# Patient Record
Sex: FEMALE | Race: WHITE | Employment: OTHER | URBAN - METROPOLITAN AREA
[De-identification: names, ages, dates, MRNs, and addresses within clinical notes are randomized per-mention and may not be internally consistent; named-entity substitution may affect disease eponyms.]

---

## 2017-01-04 ENCOUNTER — OFFICE VISIT - RIVER FALLS (OUTPATIENT)
Dept: FAMILY MEDICINE | Facility: CLINIC | Age: 62
End: 2017-01-04

## 2017-01-04 ASSESSMENT — MIFFLIN-ST. JEOR: SCORE: 1580.97

## 2017-01-16 ENCOUNTER — OFFICE VISIT - RIVER FALLS (OUTPATIENT)
Dept: FAMILY MEDICINE | Facility: CLINIC | Age: 62
End: 2017-01-16

## 2017-02-07 ENCOUNTER — OFFICE VISIT - RIVER FALLS (OUTPATIENT)
Dept: FAMILY MEDICINE | Facility: CLINIC | Age: 62
End: 2017-02-07

## 2017-02-23 ENCOUNTER — OFFICE VISIT - RIVER FALLS (OUTPATIENT)
Dept: FAMILY MEDICINE | Facility: CLINIC | Age: 62
End: 2017-02-23

## 2017-04-21 ENCOUNTER — OFFICE VISIT - RIVER FALLS (OUTPATIENT)
Dept: FAMILY MEDICINE | Facility: CLINIC | Age: 62
End: 2017-04-21

## 2017-04-21 ASSESSMENT — MIFFLIN-ST. JEOR: SCORE: 1524.72

## 2017-05-18 ENCOUNTER — OFFICE VISIT - RIVER FALLS (OUTPATIENT)
Dept: FAMILY MEDICINE | Facility: CLINIC | Age: 62
End: 2017-05-18

## 2017-09-26 LAB
CHOLEST SERPL-MCNC: 205 MG/DL
HDLC SERPL-MCNC: 35 MG/DL
LDLC SERPL CALC-MCNC: 97 MG/DL
NONHDLC SERPL-MCNC: NORMAL MG/DL
TRIGL SERPL-MCNC: 367 MG/DL

## 2018-05-24 ENCOUNTER — TRANSFERRED RECORDS (OUTPATIENT)
Dept: HEALTH INFORMATION MANAGEMENT | Facility: CLINIC | Age: 63
End: 2018-05-24

## 2018-05-24 ENCOUNTER — HOSPITAL ENCOUNTER (EMERGENCY)
Facility: CLINIC | Age: 63
Discharge: HOME | End: 2018-05-24
Attending: EMERGENCY MEDICINE | Admitting: EMERGENCY MEDICINE
Payer: MEDICARE

## 2018-05-24 VITALS
SYSTOLIC BLOOD PRESSURE: 150 MMHG | BODY MASS INDEX: 41.83 KG/M2 | TEMPERATURE: 97.8 F | DIASTOLIC BLOOD PRESSURE: 64 MMHG | OXYGEN SATURATION: 97 % | HEIGHT: 64 IN | WEIGHT: 245 LBS | RESPIRATION RATE: 18 BRPM

## 2018-05-24 DIAGNOSIS — R41.0 CONFUSION: ICD-10-CM

## 2018-05-24 LAB
ALBUMIN SERPL-MCNC: 4.1 G/DL (ref 3.4–5)
ALBUMIN UR-MCNC: 30 MG/DL
ALP SERPL-CCNC: 79 U/L (ref 40–150)
ALT SERPL W P-5'-P-CCNC: 58 U/L (ref 0–50)
AMPHETAMINES UR QL SCN: NEGATIVE
ANION GAP SERPL CALCULATED.3IONS-SCNC: 12 MMOL/L (ref 3–14)
APPEARANCE UR: CLEAR
AST SERPL W P-5'-P-CCNC: 63 U/L (ref 0–45)
BACTERIA #/AREA URNS HPF: ABNORMAL /HPF
BARBITURATES UR QL: NEGATIVE
BASOPHILS # BLD AUTO: 0 10E9/L (ref 0–0.2)
BASOPHILS NFR BLD AUTO: 0.6 %
BENZODIAZ UR QL: NEGATIVE
BILIRUB SERPL-MCNC: 0.6 MG/DL (ref 0.2–1.3)
BILIRUB UR QL STRIP: NEGATIVE
BUN SERPL-MCNC: 24 MG/DL (ref 7–30)
CALCIUM SERPL-MCNC: 8.9 MG/DL (ref 8.5–10.1)
CANNABINOIDS UR QL SCN: NEGATIVE
CHLORIDE SERPL-SCNC: 99 MMOL/L (ref 94–109)
CO2 SERPL-SCNC: 25 MMOL/L (ref 20–32)
COCAINE UR QL: NEGATIVE
COLOR UR AUTO: YELLOW
CREAT SERPL-MCNC: 0.9 MG/DL (ref 0.52–1.04)
DIFFERENTIAL METHOD BLD: NORMAL
EOSINOPHIL # BLD AUTO: 0.2 10E9/L (ref 0–0.7)
EOSINOPHIL NFR BLD AUTO: 3.2 %
ERYTHROCYTE [DISTWIDTH] IN BLOOD BY AUTOMATED COUNT: 14.4 % (ref 10–15)
GFR SERPL CREATININE-BSD FRML MDRD: 63 ML/MIN/1.7M2
GLUCOSE SERPL-MCNC: 210 MG/DL (ref 70–99)
GLUCOSE UR STRIP-MCNC: NEGATIVE MG/DL
HCT VFR BLD AUTO: 43.3 % (ref 35–47)
HGB BLD-MCNC: 14.3 G/DL (ref 11.7–15.7)
HGB UR QL STRIP: NEGATIVE
HYALINE CASTS #/AREA URNS LPF: 5 /LPF (ref 0–2)
IMM GRANULOCYTES # BLD: 0 10E9/L (ref 0–0.4)
IMM GRANULOCYTES NFR BLD: 0.2 %
INR PPP: 2.4 (ref 0.86–1.14)
INTERPRETATION ECG - MUSE: NORMAL
KETONES UR STRIP-MCNC: NEGATIVE MG/DL
LEUKOCYTE ESTERASE UR QL STRIP: NEGATIVE
LYMPHOCYTES # BLD AUTO: 1.5 10E9/L (ref 0.8–5.3)
LYMPHOCYTES NFR BLD AUTO: 30.6 %
MCH RBC QN AUTO: 30.2 PG (ref 26.5–33)
MCHC RBC AUTO-ENTMCNC: 33 G/DL (ref 31.5–36.5)
MCV RBC AUTO: 91 FL (ref 78–100)
MONOCYTES # BLD AUTO: 0.5 10E9/L (ref 0–1.3)
MONOCYTES NFR BLD AUTO: 9.9 %
MUCOUS THREADS #/AREA URNS LPF: PRESENT /LPF
NEUTROPHILS # BLD AUTO: 2.6 10E9/L (ref 1.6–8.3)
NEUTROPHILS NFR BLD AUTO: 55.5 %
NITRATE UR QL: NEGATIVE
OPIATES UR QL SCN: POSITIVE
PCP UR QL SCN: NEGATIVE
PH UR STRIP: 5.5 PH (ref 5–7)
PLATELET # BLD AUTO: 245 10E9/L (ref 150–450)
POTASSIUM SERPL-SCNC: 3.9 MMOL/L (ref 3.4–5.3)
PROT SERPL-MCNC: 8.3 G/DL (ref 6.8–8.8)
RBC # BLD AUTO: 4.74 10E12/L (ref 3.8–5.2)
RBC #/AREA URNS AUTO: 0 /HPF (ref 0–2)
SODIUM SERPL-SCNC: 136 MMOL/L (ref 133–144)
SOURCE: ABNORMAL
SP GR UR STRIP: 1.03 (ref 1–1.03)
SQUAMOUS #/AREA URNS AUTO: 8 /HPF (ref 0–1)
TSH SERPL DL<=0.005 MIU/L-ACNC: 1.65 MU/L (ref 0.4–4)
UROBILINOGEN UR STRIP-MCNC: NORMAL MG/DL (ref 0–2)
WBC # BLD AUTO: 4.7 10E9/L (ref 4–11)
WBC #/AREA URNS AUTO: 1 /HPF (ref 0–5)

## 2018-05-24 PROCEDURE — 80307 DRUG TEST PRSMV CHEM ANLYZR: CPT | Performed by: EMERGENCY MEDICINE

## 2018-05-24 PROCEDURE — 99285 EMERGENCY DEPT VISIT HI MDM: CPT | Mod: 25

## 2018-05-24 PROCEDURE — 85610 PROTHROMBIN TIME: CPT | Performed by: EMERGENCY MEDICINE

## 2018-05-24 PROCEDURE — 90791 PSYCH DIAGNOSTIC EVALUATION: CPT

## 2018-05-24 PROCEDURE — 80053 COMPREHEN METABOLIC PANEL: CPT | Performed by: EMERGENCY MEDICINE

## 2018-05-24 PROCEDURE — 81001 URINALYSIS AUTO W/SCOPE: CPT | Mod: XU | Performed by: EMERGENCY MEDICINE

## 2018-05-24 PROCEDURE — 85025 COMPLETE CBC W/AUTO DIFF WBC: CPT | Performed by: EMERGENCY MEDICINE

## 2018-05-24 PROCEDURE — 93005 ELECTROCARDIOGRAM TRACING: CPT

## 2018-05-24 PROCEDURE — 84443 ASSAY THYROID STIM HORMONE: CPT | Performed by: EMERGENCY MEDICINE

## 2018-05-24 ASSESSMENT — ENCOUNTER SYMPTOMS
DYSPHORIC MOOD: 1
BACK PAIN: 1
NAUSEA: 0
NERVOUS/ANXIOUS: 1
COUGH: 0
VOMITING: 0
DIAPHORESIS: 1
FEVER: 0
HALLUCINATIONS: 1

## 2018-05-24 NOTE — ED NOTES
"Pt with multiple complaints, pt unable to state reason for visit today, pt states \"the tremors and back pain and sleep problems are not new\"  "

## 2018-05-24 NOTE — ED PROVIDER NOTES
"  History     Chief Complaint:  Altered mental state       HPI:   The history is provided by the patient and a relative.      Samira Peralta is a 63 year old female on Coumadin with a history of atrial fibrillation, anxiety, depression, hypertension, sleep apnea, and chronic back pain who presents to the emergency department with her sister for evaluation of her altered mental state. The patient presents today with multiple complaints. She has a history of chronic back pain and was had an appointment with the pain clinic on Tuesday (2 days ago), but she accidentally missed this. She still has pain medications at home and her back pain has otherwise been baseline. She also reports that she has been diaphoretic the last couple days. She has needed to change her clothes multiple times and even after showering she still becomes sweaty. She feels this may be associated to the elevated temperature of her apartment and the use of a heating pad for her back. She also notes that she was more tremulous than usual yesterday so the \"office people\" at her apartment called the patients sister regarding this. The sister reports that she was with the patient for a couple hours yesterday and the patient was talking about all the things that she was hearing outside her apartment including people talking about drugs, a prostitution ring, and the planning of a murder. She also reports that 6 years ago the patient had an episode where she though people were video taping her and monsters were coming through her TV.     Today, as the sister feels that the patient was hallucinating and looking generally ill, she gave the patient an ultimatum: either EMS activation, or they drive together to the emergency department. The patient chose to come via private vehicle with her sister, even though this was not what she wanted. Here in the ED, the patient reports that she is feeling well, beside her back pain.  The patient feels that with " "the stress of her neighbors and this being the anniversary of her other sisters murder, she could be having exacerbations of her mental illness. She has not had any cough, fever, nausea, or vomiting and has no other complaints.     CARDIAC RISK FACTORS:  Sex:    Female   Tobacco:   Negative   Hypertension:   Negative   Hyperlipidemia:  Positive  Diabetes:   Negative   Family History:  Negative     Allergies:  Morphine Sulfate      Medications:    Ventolin   Xanax   Celebrex   Voltaren   Cymbalta  Vicodin   Dolophine  Modafinil   Omeprazole   Lyrica  Zanaflex  Coumadin     Past Medical History:    Anxiety and depression   Brachial plexitis   Hearing loss   Hypertension   Sleep apnea   Chronic back pain   Rosacea     Past Surgical History:    Breast surgery, reduction   Hysterectomy   Left knee tear, surgery     Family History:    History reviewed. No pertinent family history.      Social History:  Presents with sister   Tobacco use: Never smoker   Alcohol use: Rarely   PCP: [facility]    Marital Status:        Review of Systems   Constitutional: Positive for diaphoresis. Negative for fever.   Respiratory: Negative for cough.    Gastrointestinal: Negative for nausea and vomiting.   Musculoskeletal: Positive for back pain.   Psychiatric/Behavioral: Positive for behavioral problems, dysphoric mood and hallucinations. The patient is nervous/anxious.    All other systems reviewed and are negative.    Physical Exam     Patient Vitals for the past 24 hrs:   BP Temp Temp src Heart Rate Resp SpO2 Height Weight   05/24/18 1315 - - - - - 97 % - -   05/24/18 1309 150/64 - - - - - - -   05/24/18 1108 (!) 157/97 - - - - - - -   05/24/18 1026 (!) 200/123 97.8  F (36.6  C) Oral 91 16 97 % 1.626 m (5' 4\") 111.1 kg (245 lb)        Physical Exam  General: Appears obese  Head: No signs of trauma.   Mouth/Throat: Oropharynx is clear and moist.   Eyes: Conjunctivae are normal. Pupils are equal, round, " and reactive to light.   Neck: Normal range of motion. No nuchal rigidity. No cervical adenopathy  CV: Normal rate and regular rhythm.    Resp: Effort normal and breath sounds normal. No respiratory distress.   GI: Soft. There is no tenderness.  No rebound or guarding.  Normal bowel sounds.    MSK: Normal range of motion. no edema. No Calf tenderness.  Neuro: The patient is alert and oriented to person, place, and time.  PERRLA, EOMI, strength in upper/lower extremities normal and symmetrical.   Sensation normal. Speech normal.  GCS 15  Skin: Skin is warm and dry. No rash noted.       Emergency Department Course   ECG (11:27:00):  Rate 55 bpm. KY interval *. QRS duration 98. QT/QTc 386/369. P-R-T axes * -3 -12. Atrial fibrillation with slow ventricular response. Abnormal ECG. Agree with computer interpretation.  Interpreted at 1130 by Guevara Silva MD.     Laboratory:  UA with Microscopic: Protein albumin 30, Bacteria few, Squamous 8 (high), Mucous present, Hyaline casts 5 (high), ow Negative   Drug abuse screen 77 (urine) : Positive for opiates   TSH with free T4 reflex: 1.65  CBC: WNL (WBC 4.7, HGB 14.3, )   CMP: Glucose 201 (high), ALT 58 (high), AST 63 (high), ow WNL (Creatinine 0.90)   INR: 2.40 (high)     Emergency Department Course:  Patient was first evaluated by Angel Kincaid PA-C.     Past medical records, nursing notes, and vitals reviewed.  1107: I performed an exam of the patient and obtained history, as documented above.     Blood drawn. This was sent to the lab for further testing, results above.   The patient provided a urine sample here in the emergency department. This was sent for laboratory testing, findings above.     1220: DEC evaluated the patient.     1250: I rechecked the patient. Explained findings to patient and sister.     I personally reviewed the laboratory results with the Patient and sister and answered all related questions prior to discharge.       1310: I rechecked the  patient. Findings and plan explained to the Patient and sister. Patient discharged home with instructions regarding supportive care, medications, and reasons to return. The importance of close follow-up was reviewed.     Impression & Plan      Medical Decision Making:  Samira Peralta is a 63-year-old woman who presents due to concern of the patient's sister along with the clinic.  In speaking with the patient, she denies any specific complaints.  She states that other than it being hot in her apartment, she has been doing fairly well.  I did speak with the patient's sister who reported that she had concerns as the patient had has called the police 3 times over the last couple of days regarding what she is heard from the neighbors.  She reportedly been hearing some odd things such as thinking that neighbors are planning a murder, asking for carpet, tape, and a gun. She also talked about a prostitution ring and people using drugs.  Apparently, she had some odd thought patterns a number of years ago as well and was seen at the hospital and it was thought that this may be related to medications.  I did do workup in the department which came back overall unremarkable.  There are no signs of infection.  Patient's initial blood pressure was read as high, but I did note that it was done with an inappropriately sized blood pressure cuff and when I put an appropriate size blood pressure cuff on, her blood pressure was much more in the normal range.  I did have DEC see the patient and speak with the sister.  Ultimately, it seems that the patient has one fixed thought pattern regarding the neighbors, but does not seem to be a danger to herself or others and is otherwise caring for herself overall appropriately.  Speaking with the sister, it seems that the patient will do well most the time, but sometimes when she calls patient seems to be a little groggy and drowsy.  My concern is that the patient may be experiencing  side effects from her medications and she is on multiple medications that can cause some changes in mentation.  I recommended that she follow closely with her primary care doctor and pain clinic to discuss possible decrease in these medications.  Patient reports that she is supposed to have a stimulator placed for her back pain, and this may certainly help allow some of the medications to be decreased for controlling her overall symptoms.  When I spoke to the patient about this, the patient was somewhat defensive about having the medications decreased.  Overall for the patient is safe for discharge home with outpatient follow-up    Diagnosis:    ICD-10-CM    1. Confusion R41.0        Disposition:  Discharged to home with plan as outlined.     Mikhail Membreno  5/24/2018    EMERGENCY DEPARTMENT      Scribe Disclosure:   I, Mikhail Membreno, am serving as a scribe at 11:07 AM on 5/24/2018 to document services personally performed by Guevara Silva MD based on my observations and the provider's statements to me.        Guevara Silva MD  05/29/18 1781

## 2018-05-24 NOTE — DISCHARGE INSTRUCTIONS
You were seen because people were concerned about your actions and thought process.   Your medical work up here today is reassuring.  I am concerned that your periods of somewhat odd behavior may be related to medication side effects.  Please discuss your medications with your primary care doctor and pain doctor to discuss possible adjustments going forward.  Return to the ER for any new or worsening symptoms or further concerns.

## 2018-05-24 NOTE — ED AVS SNAPSHOT
Emergency Department    [address] [facility] [address]    Phone:  [phone]    Fax:  [phone]                                       Samira Peralta   MRN: [id number]    Department:   Emergency Department   Date of Visit:  5/24/2018           Patient Information     Date Of Birth          1955        Your diagnoses for this visit were:     Confusion        You were seen by Guevara Silva MD.      Follow-up Information     Schedule an appointment as soon as possible for a visit with [facility]    Contact information:    [address] [id number] [phone]          Follow up with  Emergency Department.    Specialty:  EMERGENCY MEDICINE    Why:  As needed, If symptoms worsen    Contact information:    [address] [facility] [address] [phone]        Call [facility].    Contact information:    [address] [address]  [phone]          Discharge Instructions       You were seen because people were concerned about your actions and thought process.   Your medical work up here today is reassuring.  I am concerned that your periods of somewhat odd behavior may be related to medication side effects.  Please discuss your medications with your primary care doctor and pain doctor to discuss possible adjustments going forward.  Return to the ER for any new or worsening symptoms or further concerns.    24 Hour Appointment Hotline       To make an appointment at any [facility], call [phone] ([phone]). If you don't have a family doctor or clinic, we will help you find one. [facility] are conveniently located to serve the needs of you and your family.             Review of your medicines      Our records show that you are taking the medicines listed below. If these are incorrect, please call your family doctor or clinic.        Dose /  Directions Last dose taken    ALPRAZolam 0.5 MG tablet   Commonly known as:  XANAX   Dose:  0.5 mg        Take 0.5 mg by mouth 3 times daily as needed.   Refills:  0        baclofen 20 MG tablet   Commonly known as:  LIORESAL   Dose:  20 mg        Take 20 mg by mouth 3 times daily.   Refills:  0        benzoyl peroxide 5 % Liqd   Quantity:  1 Bottle        Use daily as directed   Refills:  3        celeBREX 200 MG capsule   Dose:  200 mg   Generic drug:  celecoxib        Take 200 mg by mouth daily.   Refills:  0        clindamycin 1 % lotion   Commonly known as:  CLEOCIN T   Quantity:  60 mL        Apply  topically 2 times daily. To face   Refills:  12        CYMBALTA 30 MG EC capsule   Dose:  30 mg   Generic drug:  DULoxetine        Take 30 mg by mouth 2 times daily.   Refills:  0        lisinopril 20 MG tablet   Commonly known as:  PRINIVIL/ZESTRIL   Dose:  20 mg        Take 20 mg by mouth daily.   Refills:  0        LYRICA 100 MG capsule   Generic drug:  pregabalin        Take  by mouth 3 times daily.   Refills:  0        methadone 10 MG tablet   Commonly known as:  DOLOPHINE   Dose:  2 tablet        Take 2 tablets by mouth 2 times daily.   Refills:  0        omeprazole 20 MG tablet   Dose:  20 mg        Take 20 mg by mouth 2 times daily.   Refills:  0        PROVIGIL 200 MG tablet   Dose:  200 mg   Generic drug:  modafinil        Take 200 mg by mouth 2 times daily.   Refills:  0        tiZANidine 4 MG tablet   Commonly known as:  ZANAFLEX        Take  by mouth daily.   Refills:  0        VENTOLIN IN        Inhale  into the lungs.   Refills:  0        VICODIN 5-500 MG per tablet   Dose:  1-2 tablet   Generic drug:  HYDROcodone-acetaminophen        Take 1-2 tablets by mouth as needed.   Refills:  0        VOLTAREN 1 % Gel topical gel   Generic drug:  diclofenac        Apply  topically as needed.   Refills:  0        VOLTAREN PO        Take  by mouth.   Refills:  0                Procedures and tests performed  during your visit     CBC with platelets + differential    Comprehensive metabolic panel    Drug abuse screen urine    EKG 12 lead    INR    TSH with free T4 reflex    UA with Microscopic      Orders Needing Specimen Collection     None      Pending Results     No orders found from 5/22/2018 to 5/25/2018.            Pending Culture Results     No orders found from 5/22/2018 to 5/25/2018.            Pending Results Instructions     If you had any lab results that were not finalized at the time of your Discharge, you can call the ED Lab Result RN at [phone]. You will be contacted by this team for any positive Lab results or changes in treatment. The nurses are available 7 days a week from 10A to 6:30P.  You can leave a message 24 hours per day and they will return your call.        Test Results From Your Hospital Stay        5/24/2018 11:53 AM      Component Results     Component Value Ref Range & Units Status    WBC 4.7 4.0 - 11.0 10e9/L Final    RBC Count 4.74 3.8 - 5.2 10e12/L Final    Hemoglobin 14.3 11.7 - 15.7 g/dL Final    Hematocrit 43.3 35.0 - 47.0 % Final    MCV 91 78 - 100 fl Final    MCH 30.2 26.5 - 33.0 pg Final    MCHC 33.0 31.5 - 36.5 g/dL Final    RDW 14.4 10.0 - 15.0 % Final    Platelet Count 245 150 - 450 10e9/L Final    Diff Method Automated Method  Final    % Neutrophils 55.5 % Final    % Lymphocytes 30.6 % Final    % Monocytes 9.9 % Final    % Eosinophils 3.2 % Final    % Basophils 0.6 % Final    % Immature Granulocytes 0.2 % Final    Absolute Neutrophil 2.6 1.6 - 8.3 10e9/L Final    Absolute Lymphocytes 1.5 0.8 - 5.3 10e9/L Final    Absolute Monocytes 0.5 0.0 - 1.3 10e9/L Final    Absolute Eosinophils 0.2 0.0 - 0.7 10e9/L Final    Absolute Basophils 0.0 0.0 - 0.2 10e9/L Final    Abs Immature Granulocytes 0.0 0 - 0.4 10e9/L Final         5/24/2018 12:09 PM      Component Results     Component Value Ref Range & Units Status    Sodium 136 133 - 144 mmol/L Final    Potassium 3.9 3.4 - 5.3 mmol/L  Final    Chloride 99 94 - 109 mmol/L Final    Carbon Dioxide 25 20 - 32 mmol/L Final    Anion Gap 12 3 - 14 mmol/L Final    Glucose 210 (H) 70 - 99 mg/dL Final    Urea Nitrogen 24 7 - 30 mg/dL Final    Creatinine 0.90 0.52 - 1.04 mg/dL Final    GFR Estimate 63 >60 mL/min/1.7m2 Final    Non  GFR Calc    GFR Estimate If Black 77 >60 mL/min/1.7m2 Final    African American GFR Calc    Calcium 8.9 8.5 - 10.1 mg/dL Final    Bilirubin Total 0.6 0.2 - 1.3 mg/dL Final    Albumin 4.1 3.4 - 5.0 g/dL Final    Protein Total 8.3 6.8 - 8.8 g/dL Final    Alkaline Phosphatase 79 40 - 150 U/L Final    ALT 58 (H) 0 - 50 U/L Final    AST 63 (H) 0 - 45 U/L Final         5/24/2018 12:24 PM      Component Results     Component Value Ref Range & Units Status    Color Urine Yellow  Final    Appearance Urine Clear  Final    Glucose Urine Negative NEG^Negative mg/dL Final    Bilirubin Urine Negative NEG^Negative Final    Ketones Urine Negative NEG^Negative mg/dL Final    Specific Gravity Urine 1.027 1.003 - 1.035 Final    Blood Urine Negative NEG^Negative Final    pH Urine 5.5 5.0 - 7.0 pH Final    Protein Albumin Urine 30 (A) NEG^Negative mg/dL Final    Urobilinogen mg/dL Normal 0.0 - 2.0 mg/dL Final    Nitrite Urine Negative NEG^Negative Final    Leukocyte Esterase Urine Negative NEG^Negative Final    Source Midstream Urine  Final    WBC Urine 1 0 - 5 /HPF Final    RBC Urine 0 0 - 2 /HPF Final    Bacteria Urine Few (A) NEG^Negative /HPF Final    Squamous Epithelial /HPF Urine 8 (H) 0 - 1 /HPF Final    Mucous Urine Present (A) NEG^Negative /LPF Final    Hyaline Casts 5 (H) 0 - 2 /LPF Final         5/24/2018 12:33 PM      Component Results     Component Value Ref Range & Units Status    Amphetamine Qual Urine Negative NEG^Negative Final    Cutoff for a negative amphetamine is 500 ng/mL or less.    Barbiturates Qual Urine Negative NEG^Negative Final    Cutoff for a negative barbiturate is 200 ng/mL or less.    Benzodiazepine  Qual Urine Negative NEG^Negative Final    Cutoff for a negative benzodiazepine is 200 ng/mL or less.    Cannabinoids Qual Urine Negative NEG^Negative Final    Cutoff for a negative cannabinoid is 50 ng/mL or less.    Cocaine Qual Urine Negative NEG^Negative Final    Cutoff for a negative cocaine is 300 ng/mL or less.    Opiates Qualitative Urine Positive (A) NEG^Negative Final    Cutoff for a positive opiate is greater than 300 ng/mL. This is an unconfirmed   screening result to be used for medical purposes only.      PCP Qual Urine Negative NEG^Negative Final    Cutoff for a negative PCP is 25 ng/mL or less.         5/24/2018 12:16 PM      Component Results     Component Value Ref Range & Units Status    TSH 1.65 0.40 - 4.00 mU/L Final         5/24/2018 12:13 PM      Component Results     Component Value Ref Range & Units Status    INR 2.40 (H) 0.86 - 1.14 Final                Clinical Quality Measure: Blood Pressure Screening     Your blood pressure was checked while you were in the emergency department today. The last reading we obtained was  BP: (!) 157/97 . Please read the guidelines below about what these numbers mean and what you should do about them.  If your systolic blood pressure (the top number) is less than 120 and your diastolic blood pressure (the bottom number) is less than 80, then your blood pressure is normal. There is nothing more that you need to do about it.  If your systolic blood pressure (the top number) is 120-139 or your diastolic blood pressure (the bottom number) is 80-89, your blood pressure may be higher than it should be. You should have your blood pressure rechecked within a year by a primary care provider.  If your systolic blood pressure (the top number) is 140 or greater or your diastolic blood pressure (the bottom number) is 90 or greater, you may have high blood pressure. High blood pressure is treatable, but if left untreated over time it can put you at risk for heart attack,  "stroke, or kidney failure. You should have your blood pressure rechecked by a primary care provider within the next 4 weeks.  If your provider in the emergency department today gave you specific instructions to follow-up with your doctor or provider even sooner than that, you should follow that instruction and not wait for up to 4 weeks for your follow-up visit.        Thank you for choosing [facility]       Thank you for choosing [facility] for your care. Our goal is always to provide you with excellent care. Hearing back from our patients is one way we can continue to improve our services. Please take a few minutes to complete the written survey that you may receive in the mail after you visit with us. Thank you!        [facility]art Information     [employer] lets you send messages to your doctor, view your test results, renew your prescriptions, schedule appointments and more. To sign up, go to [email or web address] . Click on \"Log in\" on the left side of the screen, which will take you to the Welcome page. Then click on \"Sign up Now\" on the right side of the page.     You will be asked to enter the access code listed below, as well as some personal information. Please follow the directions to create your username and password.     Your access code is: [email or web address]  Expires: 2018  1:16 PM     Your access code will  in 90 days. If you need help or a new code, please call your [facility] clinic or [phone].        Care EveryWhere ID     This is your Care EveryWhere ID. This could be used by other organizations to access your [facility] medical records  [id number]        Equal Access to Services     [place] : Hadsujatha vazquez Sowilbert, waaxda luqadaha, qaybta kaalmada antonio, maulik marie. So [facility] [phone].    ATENCIÓN: Si habla español, tiene a mercedes disposición servicios gratuitos de asistencia lingüística. Llame al [phone].    We comply with applicable federal " civil rights laws and [place] laws. We do not discriminate on the basis of race, color, national origin, age, disability, sex, sexual orientation, or gender identity.            After Visit Summary       This is your record. Keep this with you and show to your community pharmacist(s) and doctor(s) at your next visit.

## 2018-05-24 NOTE — ED AVS SNAPSHOT
Emergency Department    [address] [facility] [address]    Phone:  [phone]    Fax:  [phone]                                       Samira Peralta   MRN: [id number]    Department:   Emergency Department   Date of Visit:  5/24/2018           After Visit Summary Signature Page     I have received my discharge instructions, and my questions have been answered. I have discussed any challenges I see with this plan with the nurse or doctor.    ..........................................................................................................................................  Patient/Patient Representative Signature      ..........................................................................................................................................  Patient Representative Print Name and Relationship to Patient    ..................................................               ................................................  Date                                            Time    ..........................................................................................................................................  Reviewed by Signature/Title    ...................................................              ..............................................  Date                                                            Time

## 2018-06-26 ENCOUNTER — HOSPITAL ENCOUNTER (EMERGENCY)
Facility: CLINIC | Age: 63
Discharge: HOME | End: 2018-06-26
Attending: EMERGENCY MEDICINE | Admitting: EMERGENCY MEDICINE
Payer: MEDICARE

## 2018-06-26 VITALS
OXYGEN SATURATION: 95 % | TEMPERATURE: 98.4 F | BODY MASS INDEX: 42.52 KG/M2 | RESPIRATION RATE: 18 BRPM | DIASTOLIC BLOOD PRESSURE: 109 MMHG | WEIGHT: 240 LBS | SYSTOLIC BLOOD PRESSURE: 147 MMHG | HEIGHT: 63 IN | HEART RATE: 62 BPM

## 2018-06-26 DIAGNOSIS — R45.1 AGITATION: ICD-10-CM

## 2018-06-26 LAB — TSH SERPL-ACNC: 1.33 MCU/ML

## 2018-06-26 PROCEDURE — 90791 PSYCH DIAGNOSTIC EVALUATION: CPT

## 2018-06-26 PROCEDURE — 99285 EMERGENCY DEPT VISIT HI MDM: CPT | Mod: 25

## 2018-06-26 ASSESSMENT — ENCOUNTER SYMPTOMS
COUGH: 0
VOMITING: 0
FEVER: 0

## 2018-06-26 NOTE — DISCHARGE INSTRUCTIONS
You were seen in the ER after an episode of having an argument with a family member.  Please call the assisted living facilities from the information that was provided to discuss your next living arrangements.  Please continue to follow up with your primary care doctor.

## 2018-06-26 NOTE — PROGRESS NOTES
I was asked to see this patient and her sister as they had housing questions.  They were asking if they qualified for an Elderly Waiver and what is better an EW or section 8 housing vouchers.  I gave them the information for the VE center in [place]. I directed them to go there and see what they qualify for. They continued to ask who takes an EW--they were given the Sr. Living book and I explained that they will need to contact some places listed in the book to find a place that accepts EW.  They continued to ask the same questions and I continued to direct them to the VE center. I updated the staff on the above.

## 2018-06-26 NOTE — ED AVS SNAPSHOT
Emergency Department    [address] [facility] [address]    Phone:  [phone]    Fax:  [phone]                                       Samira Peralta   MRN: [id number]    Department:   Emergency Department   Date of Visit:  6/26/2018           Patient Information     Date Of Birth          1955        Your diagnoses for this visit were:     Agitation        You were seen by Guevara Silva MD.      Follow-up Information     [facility]    Contact information:    [address]  [facility] [id number] [phone]          Follow up with  Emergency Department.    Specialty:  EMERGENCY MEDICINE    Why:  As needed, If symptoms worsen    Contact information:    [address] [facility] [address] [phone]        Discharge Instructions       You were seen in the ER after an episode of having an argument with a family member.  Please call the assisted living facilities from the information that was provided to discuss your next living arrangements.  Please continue to follow up with your primary care doctor.    24 Hour Appointment Hotline       To make an appointment at any [facility], call [phone] ([phone]). If you don't have a family doctor or clinic, we will help you find one. [place] clinics are conveniently located to serve the needs of you and your family.             Review of your medicines      Our records show that you are taking the medicines listed below. If these are incorrect, please call your family doctor or clinic.        Dose / Directions Last dose taken    ALPRAZolam 0.5 MG tablet   Commonly known as:  XANAX   Dose:  0.5 mg        Take 0.5 mg by mouth 3 times daily as needed.   Refills:  0        baclofen 20 MG tablet   Commonly known as:  LIORESAL   Dose:  20 mg        Take 20 mg by mouth 3 times daily.   Refills:  0        benzoyl peroxide 5 % Liqd   Quantity:  1  Bottle        Use daily as directed   Refills:  3        celeBREX 200 MG capsule   Dose:  200 mg   Generic drug:  celecoxib        Take 200 mg by mouth daily.   Refills:  0        clindamycin 1 % lotion   Commonly known as:  CLEOCIN T   Quantity:  60 mL        Apply  topically 2 times daily. To face   Refills:  12        CYMBALTA 30 MG EC capsule   Dose:  30 mg   Generic drug:  DULoxetine        Take 30 mg by mouth 2 times daily.   Refills:  0        lisinopril 20 MG tablet   Commonly known as:  PRINIVIL/ZESTRIL   Dose:  20 mg        Take 20 mg by mouth daily.   Refills:  0        LYRICA 100 MG capsule   Generic drug:  pregabalin        Take  by mouth 3 times daily.   Refills:  0        methadone 10 MG tablet   Commonly known as:  DOLOPHINE   Dose:  2 tablet        Take 2 tablets by mouth 2 times daily.   Refills:  0        omeprazole 20 MG tablet   Dose:  20 mg        Take 20 mg by mouth 2 times daily.   Refills:  0        PROVIGIL 200 MG tablet   Dose:  200 mg   Generic drug:  modafinil        Take 200 mg by mouth 2 times daily.   Refills:  0        tiZANidine 4 MG tablet   Commonly known as:  ZANAFLEX        Take  by mouth daily.   Refills:  0        VENTOLIN IN        Inhale  into the lungs.   Refills:  0        VICODIN 5-500 MG per tablet   Dose:  1-2 tablet   Generic drug:  HYDROcodone-acetaminophen        Take 1-2 tablets by mouth as needed.   Refills:  0        VOLTAREN 1 % Gel topical gel   Generic drug:  diclofenac        Apply  topically as needed.   Refills:  0        VOLTAREN PO        Take  by mouth.   Refills:  0                Orders Needing Specimen Collection     None      Pending Results     No orders found from 6/24/2018 to 6/27/2018.            Pending Culture Results     No orders found from 6/24/2018 to 6/27/2018.            Pending Results Instructions     If you had any lab results that were not finalized at the time of your Discharge, you can call the ED Lab Result RN at [phone]. You  will be contacted by this team for any positive Lab results or changes in treatment. The nurses are available 7 days a week from 10A to 6:30P.  You can leave a message 24 hours per day and they will return your call.        Test Results From Your Hospital Stay               Clinical Quality Measure: Blood Pressure Screening     Your blood pressure was checked while you were in the emergency department today. The last reading we obtained was  BP: 156/71 . Please read the guidelines below about what these numbers mean and what you should do about them.  If your systolic blood pressure (the top number) is less than 120 and your diastolic blood pressure (the bottom number) is less than 80, then your blood pressure is normal. There is nothing more that you need to do about it.  If your systolic blood pressure (the top number) is 120-139 or your diastolic blood pressure (the bottom number) is 80-89, your blood pressure may be higher than it should be. You should have your blood pressure rechecked within a year by a primary care provider.  If your systolic blood pressure (the top number) is 140 or greater or your diastolic blood pressure (the bottom number) is 90 or greater, you may have high blood pressure. High blood pressure is treatable, but if left untreated over time it can put you at risk for heart attack, stroke, or kidney failure. You should have your blood pressure rechecked by a primary care provider within the next 4 weeks.  If your provider in the emergency department today gave you specific instructions to follow-up with your doctor or provider even sooner than that, you should follow that instruction and not wait for up to 4 weeks for your follow-up visit.        Thank you for choosing [facility]       Thank you for choosing [facility] for your care. Our goal is always to provide you with excellent care. Hearing back from our patients is one way we can continue to improve our services. Please take a few minutes  "to complete the written survey that you may receive in the mail after you visit with us. Thank you!        [employer] Information     [employer] lets you send messages to your doctor, view your test results, renew your prescriptions, schedule appointments and more. To sign up, go to [email or web address] . Click on \"Log in\" on the left side of the screen, which will take you to the Welcome page. Then click on \"Sign up Now\" on the right side of the page.     You will be asked to enter the access code listed below, as well as some personal information. Please follow the directions to create your username and password.     Your access code is: [email or web address]  Expires: 2018  1:16 PM     Your access code will  in 90 days. If you need help or a new code, please call your [place] clinic or [phone].        Care EveryWhere ID     This is your Care EveryWhere ID. This could be used by other organizations to access your [place] medical records  [id number]        Equal Access to Services     [facility]: Hadii jaylon vazquez Sowilbert, waaxda luqadaha, qaybta kaalmasara alvarez, maulik romero . So [facility] [phone].    ATENCIÓN: Si habla español, tiene a mercedes disposición servicios gratuitos de asistencia lingüística. Llame al [phone].    We comply with applicable federal civil rights laws and [place] laws. We do not discriminate on the basis of race, color, national origin, age, disability, sex, sexual orientation, or gender identity.            After Visit Summary       This is your record. Keep this with you and show to your community pharmacist(s) and doctor(s) at your next visit.                  "
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
## 2018-07-25 ENCOUNTER — APPOINTMENT (OUTPATIENT)
Dept: CARDIOLOGY | Facility: CLINIC | Age: 63
DRG: 287 | End: 2018-07-25
Attending: PHYSICIAN ASSISTANT
Payer: MEDICARE

## 2018-07-25 ENCOUNTER — APPOINTMENT (OUTPATIENT)
Dept: CT IMAGING | Facility: CLINIC | Age: 63
DRG: 287 | End: 2018-07-25
Attending: EMERGENCY MEDICINE
Payer: MEDICARE

## 2018-07-25 ENCOUNTER — APPOINTMENT (OUTPATIENT)
Dept: CARDIOLOGY | Facility: CLINIC | Age: 63
DRG: 287 | End: 2018-07-25
Attending: INTERNAL MEDICINE
Payer: MEDICARE

## 2018-07-25 ENCOUNTER — HOSPITAL ENCOUNTER (INPATIENT)
Facility: CLINIC | Age: 63
LOS: 3 days | Discharge: HOME | DRG: 287 | End: 2018-07-28
Attending: EMERGENCY MEDICINE | Admitting: INTERNAL MEDICINE
Payer: MEDICARE

## 2018-07-25 DIAGNOSIS — E78.2 MIXED HYPERLIPIDEMIA: Primary | ICD-10-CM

## 2018-07-25 DIAGNOSIS — I48.91 ATRIAL FIBRILLATION, UNSPECIFIED TYPE (H): ICD-10-CM

## 2018-07-25 DIAGNOSIS — I21.4 NSTEMI (NON-ST ELEVATED MYOCARDIAL INFARCTION) (H): ICD-10-CM

## 2018-07-25 DIAGNOSIS — F29 PSYCHOSIS, UNSPECIFIED PSYCHOSIS TYPE (H): ICD-10-CM

## 2018-07-25 DIAGNOSIS — E11.9 TYPE 2 DIABETES MELLITUS WITHOUT COMPLICATION, WITHOUT LONG-TERM CURRENT USE OF INSULIN (H): ICD-10-CM

## 2018-07-25 PROBLEM — I24.9 ACS (ACUTE CORONARY SYNDROME) (H): Status: ACTIVE | Noted: 2018-07-25

## 2018-07-25 LAB
ALBUMIN SERPL-MCNC: 3.9 G/DL (ref 3.4–5)
ALBUMIN UR-MCNC: 30 MG/DL
ALP SERPL-CCNC: 89 U/L (ref 40–150)
ALT SERPL W P-5'-P-CCNC: 45 U/L (ref 0–50)
ANION GAP SERPL CALCULATED.3IONS-SCNC: 9 MMOL/L (ref 3–14)
APPEARANCE UR: ABNORMAL
AST SERPL W P-5'-P-CCNC: 64 U/L (ref 0–45)
BACTERIA #/AREA URNS HPF: ABNORMAL /HPF
BASOPHILS # BLD AUTO: 0 10E9/L (ref 0–0.2)
BASOPHILS NFR BLD AUTO: 0.4 %
BILIRUB SERPL-MCNC: 1.2 MG/DL (ref 0.2–1.3)
BILIRUB UR QL STRIP: NEGATIVE
BUN SERPL-MCNC: 10 MG/DL (ref 7–30)
CALCIUM SERPL-MCNC: 9.3 MG/DL (ref 8.5–10.1)
CHLORIDE SERPL-SCNC: 101 MMOL/L (ref 94–109)
CK SERPL-CCNC: 249 U/L (ref 30–225)
CO2 SERPL-SCNC: 25 MMOL/L (ref 20–32)
COLOR UR AUTO: YELLOW
CREAT SERPL-MCNC: 0.67 MG/DL (ref 0.52–1.04)
DIFFERENTIAL METHOD BLD: NORMAL
EOSINOPHIL # BLD AUTO: 0 10E9/L (ref 0–0.7)
EOSINOPHIL NFR BLD AUTO: 0.5 %
ERYTHROCYTE [DISTWIDTH] IN BLOOD BY AUTOMATED COUNT: 14.6 % (ref 10–15)
ERYTHROCYTE [DISTWIDTH] IN BLOOD BY AUTOMATED COUNT: 14.6 % (ref 10–15)
GFR SERPL CREATININE-BSD FRML MDRD: 88 ML/MIN/1.7M2
GLUCOSE BLDC GLUCOMTR-MCNC: 177 MG/DL (ref 70–99)
GLUCOSE SERPL-MCNC: 231 MG/DL (ref 70–99)
GLUCOSE UR STRIP-MCNC: 150 MG/DL
HBA1C MFR BLD: 7.9 % (ref 0–5.6)
HCT VFR BLD AUTO: 36.9 % (ref 35–47)
HCT VFR BLD AUTO: 39.1 % (ref 35–47)
HGB BLD-MCNC: 12.5 G/DL (ref 11.7–15.7)
HGB BLD-MCNC: 13.5 G/DL (ref 11.7–15.7)
HGB UR QL STRIP: NEGATIVE
IMM GRANULOCYTES # BLD: 0 10E9/L (ref 0–0.4)
IMM GRANULOCYTES NFR BLD: 0.4 %
INR PPP: 1.65 (ref 0.86–1.14)
INTERPRETATION ECG - MUSE: NORMAL
INTERPRETATION ECG - MUSE: NORMAL
KCT BLD-ACNC: 204 SEC (ref 75–150)
KETONES UR STRIP-MCNC: 10 MG/DL
LEUKOCYTE ESTERASE UR QL STRIP: NEGATIVE
LYMPHOCYTES # BLD AUTO: 1.1 10E9/L (ref 0.8–5.3)
LYMPHOCYTES NFR BLD AUTO: 14.1 %
MCH RBC QN AUTO: 30.9 PG (ref 26.5–33)
MCH RBC QN AUTO: 31.3 PG (ref 26.5–33)
MCHC RBC AUTO-ENTMCNC: 33.9 G/DL (ref 31.5–36.5)
MCHC RBC AUTO-ENTMCNC: 34.5 G/DL (ref 31.5–36.5)
MCV RBC AUTO: 91 FL (ref 78–100)
MCV RBC AUTO: 91 FL (ref 78–100)
MONOCYTES # BLD AUTO: 0.5 10E9/L (ref 0–1.3)
MONOCYTES NFR BLD AUTO: 6.5 %
MUCOUS THREADS #/AREA URNS LPF: PRESENT /LPF
NEUTROPHILS # BLD AUTO: 6.3 10E9/L (ref 1.6–8.3)
NEUTROPHILS NFR BLD AUTO: 78.1 %
NITRATE UR QL: NEGATIVE
NRBC # BLD AUTO: 0 10*3/UL
NRBC BLD AUTO-RTO: 0 /100
PH UR STRIP: 5.5 PH (ref 5–7)
PLATELET # BLD AUTO: 194 10E9/L (ref 150–450)
PLATELET # BLD AUTO: 221 10E9/L (ref 150–450)
POTASSIUM SERPL-SCNC: 3.8 MMOL/L (ref 3.4–5.3)
PROCALCITONIN SERPL-MCNC: 0.11 NG/ML
PROT SERPL-MCNC: 8.5 G/DL (ref 6.8–8.8)
RBC # BLD AUTO: 4.05 10E12/L (ref 3.8–5.2)
RBC # BLD AUTO: 4.31 10E12/L (ref 3.8–5.2)
RBC #/AREA URNS AUTO: 0 /HPF (ref 0–2)
SODIUM SERPL-SCNC: 135 MMOL/L (ref 133–144)
SOURCE: ABNORMAL
SP GR UR STRIP: 1.02 (ref 1–1.03)
SQUAMOUS #/AREA URNS AUTO: 87 /HPF (ref 0–1)
TROPONIN I SERPL-MCNC: 1.24 UG/L (ref 0–0.04)
TROPONIN I SERPL-MCNC: 1.41 UG/L (ref 0–0.04)
TROPONIN I SERPL-MCNC: 1.61 UG/L (ref 0–0.04)
UROBILINOGEN UR STRIP-MCNC: NORMAL MG/DL (ref 0–2)
WBC # BLD AUTO: 7.4 10E9/L (ref 4–11)
WBC # BLD AUTO: 8 10E9/L (ref 4–11)
WBC #/AREA URNS AUTO: 3 /HPF (ref 0–5)

## 2018-07-25 PROCEDURE — 25000125 ZZHC RX 250: Performed by: INTERNAL MEDICINE

## 2018-07-25 PROCEDURE — 25000128 H RX IP 250 OP 636: Performed by: EMERGENCY MEDICINE

## 2018-07-25 PROCEDURE — 4A023N7 MEASUREMENT OF CARDIAC SAMPLING AND PRESSURE, LEFT HEART, PERCUTANEOUS APPROACH: ICD-10-PCS | Performed by: INTERNAL MEDICINE

## 2018-07-25 PROCEDURE — 99153 MOD SED SAME PHYS/QHP EA: CPT

## 2018-07-25 PROCEDURE — 93325 DOPPLER ECHO COLOR FLOW MAPG: CPT | Mod: 26 | Performed by: INTERNAL MEDICINE

## 2018-07-25 PROCEDURE — 99152 MOD SED SAME PHYS/QHP 5/>YRS: CPT

## 2018-07-25 PROCEDURE — 84484 ASSAY OF TROPONIN QUANT: CPT | Performed by: EMERGENCY MEDICINE

## 2018-07-25 PROCEDURE — 27210856 ZZH ACCESS HEART CATH CR2

## 2018-07-25 PROCEDURE — 85347 COAGULATION TIME ACTIVATED: CPT

## 2018-07-25 PROCEDURE — 80053 COMPREHEN METABOLIC PANEL: CPT | Performed by: EMERGENCY MEDICINE

## 2018-07-25 PROCEDURE — 83036 HEMOGLOBIN GLYCOSYLATED A1C: CPT | Performed by: PHYSICIAN ASSISTANT

## 2018-07-25 PROCEDURE — A9270 NON-COVERED ITEM OR SERVICE: HCPCS | Mod: GY | Performed by: EMERGENCY MEDICINE

## 2018-07-25 PROCEDURE — 93458 L HRT ARTERY/VENTRICLE ANGIO: CPT

## 2018-07-25 PROCEDURE — 99222 1ST HOSP IP/OBS MODERATE 55: CPT | Mod: 25 | Performed by: INTERNAL MEDICINE

## 2018-07-25 PROCEDURE — 93308 TTE F-UP OR LMTD: CPT

## 2018-07-25 PROCEDURE — 93010 ELECTROCARDIOGRAM REPORT: CPT | Performed by: INTERNAL MEDICINE

## 2018-07-25 PROCEDURE — 25000128 H RX IP 250 OP 636: Performed by: PHYSICIAN ASSISTANT

## 2018-07-25 PROCEDURE — 36415 COLL VENOUS BLD VENIPUNCTURE: CPT | Performed by: PHYSICIAN ASSISTANT

## 2018-07-25 PROCEDURE — 25500064 ZZH RX 255 OP 636: Performed by: INTERNAL MEDICINE

## 2018-07-25 PROCEDURE — 99285 EMERGENCY DEPT VISIT HI MDM: CPT | Mod: 25

## 2018-07-25 PROCEDURE — 27210910 ZZH NEEDLE CR6

## 2018-07-25 PROCEDURE — 27211089 ZZH KIT ACIST INJECTOR CR3

## 2018-07-25 PROCEDURE — 93458 L HRT ARTERY/VENTRICLE ANGIO: CPT | Mod: 26 | Performed by: INTERNAL MEDICINE

## 2018-07-25 PROCEDURE — 96374 THER/PROPH/DIAG INJ IV PUSH: CPT | Mod: 59

## 2018-07-25 PROCEDURE — 93321 DOPPLER ECHO F-UP/LMTD STD: CPT | Mod: 26 | Performed by: INTERNAL MEDICINE

## 2018-07-25 PROCEDURE — 27210946 ZZH KIT HC TOTES DISP CR8

## 2018-07-25 PROCEDURE — 93005 ELECTROCARDIOGRAM TRACING: CPT

## 2018-07-25 PROCEDURE — 84145 PROCALCITONIN (PCT): CPT | Performed by: EMERGENCY MEDICINE

## 2018-07-25 PROCEDURE — 25000132 ZZH RX MED GY IP 250 OP 250 PS 637: Mod: GY | Performed by: EMERGENCY MEDICINE

## 2018-07-25 PROCEDURE — 82550 ASSAY OF CK (CPK): CPT | Performed by: EMERGENCY MEDICINE

## 2018-07-25 PROCEDURE — 81001 URINALYSIS AUTO W/SCOPE: CPT | Performed by: EMERGENCY MEDICINE

## 2018-07-25 PROCEDURE — A9270 NON-COVERED ITEM OR SERVICE: HCPCS | Mod: GY | Performed by: INTERNAL MEDICINE

## 2018-07-25 PROCEDURE — 21000000 ZZH R&B IMCU HEART CARE

## 2018-07-25 PROCEDURE — 93005 ELECTROCARDIOGRAM TRACING: CPT | Mod: 76

## 2018-07-25 PROCEDURE — 74177 CT ABD & PELVIS W/CONTRAST: CPT

## 2018-07-25 PROCEDURE — 25000132 ZZH RX MED GY IP 250 OP 250 PS 637: Mod: GY

## 2018-07-25 PROCEDURE — 25000125 ZZHC RX 250: Performed by: EMERGENCY MEDICINE

## 2018-07-25 PROCEDURE — 27210914 ZZH SHEATH CR8

## 2018-07-25 PROCEDURE — 93308 TTE F-UP OR LMTD: CPT | Mod: 26 | Performed by: INTERNAL MEDICINE

## 2018-07-25 PROCEDURE — 25000132 ZZH RX MED GY IP 250 OP 250 PS 637: Mod: GY | Performed by: INTERNAL MEDICINE

## 2018-07-25 PROCEDURE — 00000146 ZZHCL STATISTIC GLUCOSE BY METER IP

## 2018-07-25 PROCEDURE — 85610 PROTHROMBIN TIME: CPT | Performed by: EMERGENCY MEDICINE

## 2018-07-25 PROCEDURE — 25000132 ZZH RX MED GY IP 250 OP 250 PS 637: Mod: GY | Performed by: PHYSICIAN ASSISTANT

## 2018-07-25 PROCEDURE — 84484 ASSAY OF TROPONIN QUANT: CPT | Performed by: PHYSICIAN ASSISTANT

## 2018-07-25 PROCEDURE — 25000128 H RX IP 250 OP 636: Performed by: INTERNAL MEDICINE

## 2018-07-25 PROCEDURE — A9270 NON-COVERED ITEM OR SERVICE: HCPCS | Mod: GY

## 2018-07-25 PROCEDURE — A9270 NON-COVERED ITEM OR SERVICE: HCPCS | Mod: GY | Performed by: PHYSICIAN ASSISTANT

## 2018-07-25 PROCEDURE — B2111ZZ FLUOROSCOPY OF MULTIPLE CORONARY ARTERIES USING LOW OSMOLAR CONTRAST: ICD-10-PCS | Performed by: INTERNAL MEDICINE

## 2018-07-25 PROCEDURE — 25000132 ZZH RX MED GY IP 250 OP 250 PS 637: Performed by: INTERNAL MEDICINE

## 2018-07-25 PROCEDURE — 27210795 ZZH PAD DEFIB QUICK CR4

## 2018-07-25 PROCEDURE — 85025 COMPLETE CBC W/AUTO DIFF WBC: CPT | Performed by: EMERGENCY MEDICINE

## 2018-07-25 PROCEDURE — B2151ZZ FLUOROSCOPY OF LEFT HEART USING LOW OSMOLAR CONTRAST: ICD-10-PCS | Performed by: INTERNAL MEDICINE

## 2018-07-25 PROCEDURE — 99207 ZZC APP CREDIT; MD BILLING SHARED VISIT: CPT | Performed by: PHYSICIAN ASSISTANT

## 2018-07-25 PROCEDURE — 96375 TX/PRO/DX INJ NEW DRUG ADDON: CPT

## 2018-07-25 PROCEDURE — 99223 1ST HOSP IP/OBS HIGH 75: CPT | Mod: AI | Performed by: INTERNAL MEDICINE

## 2018-07-25 PROCEDURE — C1769 GUIDE WIRE: HCPCS

## 2018-07-25 PROCEDURE — 85027 COMPLETE CBC AUTOMATED: CPT | Performed by: PHYSICIAN ASSISTANT

## 2018-07-25 PROCEDURE — 99152 MOD SED SAME PHYS/QHP 5/>YRS: CPT | Performed by: INTERNAL MEDICINE

## 2018-07-25 PROCEDURE — C1760 CLOSURE DEV, VASC: HCPCS

## 2018-07-25 RX ORDER — DOBUTAMINE HYDROCHLORIDE 200 MG/100ML
2-20 INJECTION INTRAVENOUS CONTINUOUS PRN
Status: DISCONTINUED | OUTPATIENT
Start: 2018-07-25 | End: 2018-07-25 | Stop reason: HOSPADM

## 2018-07-25 RX ORDER — NITROGLYCERIN 20 MG/100ML
.07-1.8 INJECTION INTRAVENOUS CONTINUOUS
Status: DISCONTINUED | OUTPATIENT
Start: 2018-07-25 | End: 2018-07-25

## 2018-07-25 RX ORDER — ACETAMINOPHEN 500 MG
500 TABLET ORAL 2 TIMES DAILY PRN
Status: DISCONTINUED | OUTPATIENT
Start: 2018-07-25 | End: 2018-07-25

## 2018-07-25 RX ORDER — FENTANYL CITRATE 50 UG/ML
25-50 INJECTION, SOLUTION INTRAMUSCULAR; INTRAVENOUS
Status: ACTIVE | OUTPATIENT
Start: 2018-07-25 | End: 2018-07-26

## 2018-07-25 RX ORDER — WARFARIN SODIUM 7.5 MG/1
7.5 TABLET ORAL
Status: COMPLETED | OUTPATIENT
Start: 2018-07-25 | End: 2018-07-25

## 2018-07-25 RX ORDER — CETIRIZINE HYDROCHLORIDE 10 MG/1
10 TABLET ORAL DAILY
Status: DISCONTINUED | OUTPATIENT
Start: 2018-07-25 | End: 2018-07-28 | Stop reason: HOSPADM

## 2018-07-25 RX ORDER — LORAZEPAM 2 MG/ML
0.5 INJECTION INTRAMUSCULAR
Status: DISCONTINUED | OUTPATIENT
Start: 2018-07-25 | End: 2018-07-25 | Stop reason: HOSPADM

## 2018-07-25 RX ORDER — ASPIRIN 81 MG/1
81-324 TABLET, CHEWABLE ORAL
Status: DISCONTINUED | OUTPATIENT
Start: 2018-07-25 | End: 2018-07-25 | Stop reason: HOSPADM

## 2018-07-25 RX ORDER — TIZANIDINE 2 MG/1
2 TABLET ORAL 2 TIMES DAILY PRN
Status: DISCONTINUED | OUTPATIENT
Start: 2018-07-25 | End: 2018-07-28 | Stop reason: HOSPADM

## 2018-07-25 RX ORDER — SODIUM CHLORIDE 9 MG/ML
INJECTION, SOLUTION INTRAVENOUS CONTINUOUS
Status: DISCONTINUED | OUTPATIENT
Start: 2018-07-25 | End: 2018-07-27

## 2018-07-25 RX ORDER — POTASSIUM CHLORIDE 7.45 MG/ML
10 INJECTION INTRAVENOUS
Status: DISCONTINUED | OUTPATIENT
Start: 2018-07-25 | End: 2018-07-25 | Stop reason: HOSPADM

## 2018-07-25 RX ORDER — DOPAMINE HYDROCHLORIDE 160 MG/100ML
2-20 INJECTION, SOLUTION INTRAVENOUS CONTINUOUS PRN
Status: DISCONTINUED | OUTPATIENT
Start: 2018-07-25 | End: 2018-07-25 | Stop reason: HOSPADM

## 2018-07-25 RX ORDER — NITROGLYCERIN 5 MG/ML
100-500 VIAL (ML) INTRAVENOUS
Status: DISCONTINUED | OUTPATIENT
Start: 2018-07-25 | End: 2018-07-25 | Stop reason: HOSPADM

## 2018-07-25 RX ORDER — LIDOCAINE HYDROCHLORIDE 10 MG/ML
30 INJECTION, SOLUTION EPIDURAL; INFILTRATION; INTRACAUDAL; PERINEURAL
Status: DISCONTINUED | OUTPATIENT
Start: 2018-07-25 | End: 2018-07-25 | Stop reason: HOSPADM

## 2018-07-25 RX ORDER — POLYETHYLENE GLYCOL 3350 17 G/17G
17 POWDER, FOR SOLUTION ORAL DAILY PRN
Status: DISCONTINUED | OUTPATIENT
Start: 2018-07-25 | End: 2018-07-28 | Stop reason: HOSPADM

## 2018-07-25 RX ORDER — BACLOFEN 20 MG/1
20 TABLET ORAL 3 TIMES DAILY PRN
Status: DISCONTINUED | OUTPATIENT
Start: 2018-07-25 | End: 2018-07-28 | Stop reason: HOSPADM

## 2018-07-25 RX ORDER — IOPAMIDOL 755 MG/ML
123 INJECTION, SOLUTION INTRAVASCULAR ONCE
Status: COMPLETED | OUTPATIENT
Start: 2018-07-25 | End: 2018-07-25

## 2018-07-25 RX ORDER — HYDROCODONE BITARTRATE AND ACETAMINOPHEN 5; 325 MG/1; MG/1
1-2 TABLET ORAL EVERY 4 HOURS PRN
Status: CANCELLED | OUTPATIENT
Start: 2018-07-25

## 2018-07-25 RX ORDER — FENTANYL CITRATE 50 UG/ML
25-50 INJECTION, SOLUTION INTRAMUSCULAR; INTRAVENOUS
Status: DISCONTINUED | OUTPATIENT
Start: 2018-07-25 | End: 2018-07-25 | Stop reason: HOSPADM

## 2018-07-25 RX ORDER — LORAZEPAM 2 MG/ML
.5-2 INJECTION INTRAMUSCULAR EVERY 4 HOURS PRN
Status: DISCONTINUED | OUTPATIENT
Start: 2018-07-25 | End: 2018-07-25 | Stop reason: HOSPADM

## 2018-07-25 RX ORDER — PROTAMINE SULFATE 10 MG/ML
25-100 INJECTION, SOLUTION INTRAVENOUS EVERY 5 MIN PRN
Status: DISCONTINUED | OUTPATIENT
Start: 2018-07-25 | End: 2018-07-25 | Stop reason: HOSPADM

## 2018-07-25 RX ORDER — CLOPIDOGREL BISULFATE 75 MG/1
75 TABLET ORAL
Status: DISCONTINUED | OUTPATIENT
Start: 2018-07-25 | End: 2018-07-25 | Stop reason: HOSPADM

## 2018-07-25 RX ORDER — ASPIRIN 325 MG
325 TABLET ORAL DAILY
Status: DISCONTINUED | OUTPATIENT
Start: 2018-07-26 | End: 2018-07-25

## 2018-07-25 RX ORDER — NALOXONE HYDROCHLORIDE 0.4 MG/ML
.1-.4 INJECTION, SOLUTION INTRAMUSCULAR; INTRAVENOUS; SUBCUTANEOUS
Status: DISCONTINUED | OUTPATIENT
Start: 2018-07-25 | End: 2018-07-28 | Stop reason: HOSPADM

## 2018-07-25 RX ORDER — PHENYLEPHRINE HCL IN 0.9% NACL 1 MG/10 ML
20-100 SYRINGE (ML) INTRAVENOUS
Status: DISCONTINUED | OUTPATIENT
Start: 2018-07-25 | End: 2018-07-25 | Stop reason: HOSPADM

## 2018-07-25 RX ORDER — SODIUM CHLORIDE 9 MG/ML
INJECTION, SOLUTION INTRAVENOUS CONTINUOUS
Status: DISCONTINUED | OUTPATIENT
Start: 2018-07-25 | End: 2018-07-25

## 2018-07-25 RX ORDER — HYDROCODONE BITARTRATE AND ACETAMINOPHEN 7.5; 325 MG/1; MG/1
1 TABLET ORAL ONCE
Status: COMPLETED | OUTPATIENT
Start: 2018-07-25 | End: 2018-07-25

## 2018-07-25 RX ORDER — DIPHENHYDRAMINE HYDROCHLORIDE 50 MG/ML
25-50 INJECTION INTRAMUSCULAR; INTRAVENOUS
Status: DISCONTINUED | OUTPATIENT
Start: 2018-07-25 | End: 2018-07-25 | Stop reason: HOSPADM

## 2018-07-25 RX ORDER — PRASUGREL 10 MG/1
10-60 TABLET, FILM COATED ORAL
Status: DISCONTINUED | OUTPATIENT
Start: 2018-07-25 | End: 2018-07-25 | Stop reason: HOSPADM

## 2018-07-25 RX ORDER — NITROGLYCERIN 0.4 MG/1
0.4 TABLET SUBLINGUAL EVERY 5 MIN PRN
Status: DISCONTINUED | OUTPATIENT
Start: 2018-07-25 | End: 2018-07-25

## 2018-07-25 RX ORDER — DEXTROSE MONOHYDRATE 25 G/50ML
12.5-5 INJECTION, SOLUTION INTRAVENOUS EVERY 30 MIN PRN
Status: DISCONTINUED | OUTPATIENT
Start: 2018-07-25 | End: 2018-07-25 | Stop reason: HOSPADM

## 2018-07-25 RX ORDER — NICOTINE POLACRILEX 4 MG
15-30 LOZENGE BUCCAL
Status: DISCONTINUED | OUTPATIENT
Start: 2018-07-25 | End: 2018-07-28 | Stop reason: HOSPADM

## 2018-07-25 RX ORDER — NITROGLYCERIN 20 MG/100ML
.07-1.8 INJECTION INTRAVENOUS CONTINUOUS PRN
Status: DISCONTINUED | OUTPATIENT
Start: 2018-07-25 | End: 2018-07-25 | Stop reason: HOSPADM

## 2018-07-25 RX ORDER — HEPARIN SODIUM 1000 [USP'U]/ML
1000-10000 INJECTION, SOLUTION INTRAVENOUS; SUBCUTANEOUS EVERY 5 MIN PRN
Status: DISCONTINUED | OUTPATIENT
Start: 2018-07-25 | End: 2018-07-25 | Stop reason: HOSPADM

## 2018-07-25 RX ORDER — POLYETHYLENE GLYCOL 3350 17 G/17G
1 POWDER, FOR SOLUTION ORAL DAILY PRN
Status: ON HOLD | COMMUNITY
End: 2019-01-01

## 2018-07-25 RX ORDER — ALPRAZOLAM 0.5 MG
0.5 TABLET ORAL DAILY PRN
Status: DISCONTINUED | OUTPATIENT
Start: 2018-07-25 | End: 2018-07-28 | Stop reason: HOSPADM

## 2018-07-25 RX ORDER — NITROGLYCERIN 5 MG/ML
100-200 VIAL (ML) INTRAVENOUS
Status: DISCONTINUED | OUTPATIENT
Start: 2018-07-25 | End: 2018-07-25 | Stop reason: HOSPADM

## 2018-07-25 RX ORDER — EPINEPHRINE 1 MG/ML
0.3 INJECTION, SOLUTION, CONCENTRATE INTRAVENOUS
Status: DISCONTINUED | OUTPATIENT
Start: 2018-07-25 | End: 2018-07-25 | Stop reason: HOSPADM

## 2018-07-25 RX ORDER — CETIRIZINE HYDROCHLORIDE 10 MG/1
10 TABLET ORAL DAILY
COMMUNITY

## 2018-07-25 RX ORDER — LIDOCAINE 40 MG/G
CREAM TOPICAL
Status: DISCONTINUED | OUTPATIENT
Start: 2018-07-25 | End: 2018-07-25 | Stop reason: HOSPADM

## 2018-07-25 RX ORDER — FUROSEMIDE 20 MG
20 TABLET ORAL DAILY
Status: DISCONTINUED | OUTPATIENT
Start: 2018-07-26 | End: 2018-07-28 | Stop reason: HOSPADM

## 2018-07-25 RX ORDER — NITROGLYCERIN 0.4 MG/1
0.4 TABLET SUBLINGUAL EVERY 5 MIN PRN
Status: DISCONTINUED | OUTPATIENT
Start: 2018-07-25 | End: 2018-07-25 | Stop reason: HOSPADM

## 2018-07-25 RX ORDER — ENALAPRILAT 1.25 MG/ML
1.25-2.5 INJECTION INTRAVENOUS
Status: DISCONTINUED | OUTPATIENT
Start: 2018-07-25 | End: 2018-07-25 | Stop reason: HOSPADM

## 2018-07-25 RX ORDER — FLUMAZENIL 0.1 MG/ML
0.2 INJECTION, SOLUTION INTRAVENOUS
Status: DISCONTINUED | OUTPATIENT
Start: 2018-07-25 | End: 2018-07-25 | Stop reason: HOSPADM

## 2018-07-25 RX ORDER — ALBUTEROL SULFATE 90 UG/1
2 AEROSOL, METERED RESPIRATORY (INHALATION) 4 TIMES DAILY PRN
Status: DISCONTINUED | OUTPATIENT
Start: 2018-07-25 | End: 2018-07-28 | Stop reason: HOSPADM

## 2018-07-25 RX ORDER — ALUMINA, MAGNESIA, AND SIMETHICONE 2400; 2400; 240 MG/30ML; MG/30ML; MG/30ML
30 SUSPENSION ORAL EVERY 4 HOURS PRN
Status: DISCONTINUED | OUTPATIENT
Start: 2018-07-25 | End: 2018-07-28 | Stop reason: HOSPADM

## 2018-07-25 RX ORDER — ASPIRIN 325 MG
325 TABLET ORAL ONCE
Status: COMPLETED | OUTPATIENT
Start: 2018-07-25 | End: 2018-07-25

## 2018-07-25 RX ORDER — BUPIVACAINE HYDROCHLORIDE 2.5 MG/ML
1-10 INJECTION, SOLUTION EPIDURAL; INFILTRATION; INTRACAUDAL
Status: DISCONTINUED | OUTPATIENT
Start: 2018-07-25 | End: 2018-07-25 | Stop reason: HOSPADM

## 2018-07-25 RX ORDER — NALOXONE HYDROCHLORIDE 0.4 MG/ML
.2-.4 INJECTION, SOLUTION INTRAMUSCULAR; INTRAVENOUS; SUBCUTANEOUS
Status: ACTIVE | OUTPATIENT
Start: 2018-07-25 | End: 2018-07-26

## 2018-07-25 RX ORDER — ASPIRIN 325 MG
325 TABLET ORAL
Status: DISCONTINUED | OUTPATIENT
Start: 2018-07-25 | End: 2018-07-25 | Stop reason: HOSPADM

## 2018-07-25 RX ORDER — MULTIVITAMIN,THERAPEUTIC
1 TABLET ORAL DAILY
COMMUNITY

## 2018-07-25 RX ORDER — SODIUM NITROPRUSSIDE 25 MG/ML
100-200 INJECTION INTRAVENOUS
Status: DISCONTINUED | OUTPATIENT
Start: 2018-07-25 | End: 2018-07-25 | Stop reason: HOSPADM

## 2018-07-25 RX ORDER — METOPROLOL TARTRATE 50 MG
50 TABLET ORAL 2 TIMES DAILY
Status: DISCONTINUED | OUTPATIENT
Start: 2018-07-25 | End: 2018-07-26

## 2018-07-25 RX ORDER — NICARDIPINE HYDROCHLORIDE 2.5 MG/ML
100 INJECTION INTRAVENOUS
Status: DISCONTINUED | OUTPATIENT
Start: 2018-07-25 | End: 2018-07-25 | Stop reason: HOSPADM

## 2018-07-25 RX ORDER — BACLOFEN 10 MG/1
20 TABLET ORAL 3 TIMES DAILY PRN
Status: DISCONTINUED | OUTPATIENT
Start: 2018-07-25 | End: 2018-07-25

## 2018-07-25 RX ORDER — ADENOSINE 3 MG/ML
12-12000 INJECTION, SOLUTION INTRAVENOUS
Status: DISCONTINUED | OUTPATIENT
Start: 2018-07-25 | End: 2018-07-25 | Stop reason: HOSPADM

## 2018-07-25 RX ORDER — SIMETHICONE 80 MG
80 TABLET,CHEWABLE ORAL 2 TIMES DAILY
Status: DISCONTINUED | OUTPATIENT
Start: 2018-07-25 | End: 2018-07-28 | Stop reason: HOSPADM

## 2018-07-25 RX ORDER — ATROPINE SULFATE 0.1 MG/ML
.5-1 INJECTION INTRAVENOUS
Status: DISCONTINUED | OUTPATIENT
Start: 2018-07-25 | End: 2018-07-25 | Stop reason: HOSPADM

## 2018-07-25 RX ORDER — VERAPAMIL HYDROCHLORIDE 2.5 MG/ML
1-2.5 INJECTION, SOLUTION INTRAVENOUS
Status: DISCONTINUED | OUTPATIENT
Start: 2018-07-25 | End: 2018-07-25 | Stop reason: HOSPADM

## 2018-07-25 RX ORDER — LIDOCAINE 40 MG/G
CREAM TOPICAL
Status: DISCONTINUED | OUTPATIENT
Start: 2018-07-25 | End: 2018-07-25

## 2018-07-25 RX ORDER — ACETAMINOPHEN 325 MG/1
325-650 TABLET ORAL EVERY 4 HOURS PRN
Status: DISCONTINUED | OUTPATIENT
Start: 2018-07-25 | End: 2018-07-28 | Stop reason: HOSPADM

## 2018-07-25 RX ORDER — POLYETHYLENE GLYCOL 3350 17 G/17G
17 POWDER, FOR SOLUTION ORAL DAILY PRN
Status: DISCONTINUED | OUTPATIENT
Start: 2018-07-25 | End: 2018-07-25

## 2018-07-25 RX ORDER — POTASSIUM CHLORIDE 1500 MG/1
20 TABLET, EXTENDED RELEASE ORAL
Status: COMPLETED | OUTPATIENT
Start: 2018-07-25 | End: 2018-07-25

## 2018-07-25 RX ORDER — MORPHINE SULFATE 2 MG/ML
1-2 INJECTION, SOLUTION INTRAMUSCULAR; INTRAVENOUS EVERY 5 MIN PRN
Status: DISCONTINUED | OUTPATIENT
Start: 2018-07-25 | End: 2018-07-25 | Stop reason: HOSPADM

## 2018-07-25 RX ORDER — HYDRALAZINE HYDROCHLORIDE 20 MG/ML
10-20 INJECTION INTRAMUSCULAR; INTRAVENOUS
Status: DISCONTINUED | OUTPATIENT
Start: 2018-07-25 | End: 2018-07-25 | Stop reason: HOSPADM

## 2018-07-25 RX ORDER — LIDOCAINE HYDROCHLORIDE 10 MG/ML
1-10 INJECTION, SOLUTION EPIDURAL; INFILTRATION; INTRACAUDAL; PERINEURAL
Status: COMPLETED | OUTPATIENT
Start: 2018-07-25 | End: 2018-07-25

## 2018-07-25 RX ORDER — HYDROCODONE BITARTRATE AND ACETAMINOPHEN 5; 325 MG/1; MG/1
1-2 TABLET ORAL EVERY 6 HOURS PRN
Status: DISCONTINUED | OUTPATIENT
Start: 2018-07-25 | End: 2018-07-25

## 2018-07-25 RX ORDER — DIAPER,BRIEF,INFANT-TODD,DISP
EACH MISCELLANEOUS 2 TIMES DAILY
COMMUNITY
End: 2018-07-25

## 2018-07-25 RX ORDER — FLUMAZENIL 0.1 MG/ML
0.2 INJECTION, SOLUTION INTRAVENOUS
Status: ACTIVE | OUTPATIENT
Start: 2018-07-25 | End: 2018-07-26

## 2018-07-25 RX ORDER — POTASSIUM CHLORIDE 750 MG/1
20 TABLET, EXTENDED RELEASE ORAL DAILY PRN
Status: ON HOLD | COMMUNITY
End: 2019-01-01

## 2018-07-25 RX ORDER — ASPIRIN 81 MG/1
324 TABLET, CHEWABLE ORAL ONCE
Status: DISCONTINUED | OUTPATIENT
Start: 2018-07-25 | End: 2018-07-25

## 2018-07-25 RX ORDER — ATORVASTATIN CALCIUM 40 MG/1
40 TABLET, FILM COATED ORAL EVERY EVENING
Status: DISCONTINUED | OUTPATIENT
Start: 2018-07-25 | End: 2018-07-28 | Stop reason: HOSPADM

## 2018-07-25 RX ORDER — HYDROCODONE BITARTRATE AND ACETAMINOPHEN 7.5; 325 MG/1; MG/1
1 TABLET ORAL 3 TIMES DAILY PRN
COMMUNITY

## 2018-07-25 RX ORDER — PREGABALIN 75 MG/1
150 CAPSULE ORAL 3 TIMES DAILY
Status: DISCONTINUED | OUTPATIENT
Start: 2018-07-25 | End: 2018-07-28 | Stop reason: HOSPADM

## 2018-07-25 RX ORDER — NIFEDIPINE 10 MG/1
10 CAPSULE ORAL
Status: DISCONTINUED | OUTPATIENT
Start: 2018-07-25 | End: 2018-07-25 | Stop reason: HOSPADM

## 2018-07-25 RX ORDER — DULOXETIN HYDROCHLORIDE 60 MG/1
60 CAPSULE, DELAYED RELEASE ORAL 2 TIMES DAILY
Status: DISCONTINUED | OUTPATIENT
Start: 2018-07-25 | End: 2018-07-28 | Stop reason: HOSPADM

## 2018-07-25 RX ORDER — FUROSEMIDE 10 MG/ML
20-100 INJECTION INTRAMUSCULAR; INTRAVENOUS
Status: DISCONTINUED | OUTPATIENT
Start: 2018-07-25 | End: 2018-07-25 | Stop reason: HOSPADM

## 2018-07-25 RX ORDER — NALOXONE HYDROCHLORIDE 0.4 MG/ML
0.4 INJECTION, SOLUTION INTRAMUSCULAR; INTRAVENOUS; SUBCUTANEOUS EVERY 5 MIN PRN
Status: DISCONTINUED | OUTPATIENT
Start: 2018-07-25 | End: 2018-07-25 | Stop reason: HOSPADM

## 2018-07-25 RX ORDER — SODIUM CHLORIDE 9 MG/ML
INJECTION, SOLUTION INTRAVENOUS CONTINUOUS
Status: DISCONTINUED | OUTPATIENT
Start: 2018-07-25 | End: 2018-07-25 | Stop reason: HOSPADM

## 2018-07-25 RX ORDER — ONDANSETRON 2 MG/ML
4 INJECTION INTRAMUSCULAR; INTRAVENOUS EVERY 4 HOURS PRN
Status: DISCONTINUED | OUTPATIENT
Start: 2018-07-25 | End: 2018-07-25 | Stop reason: HOSPADM

## 2018-07-25 RX ORDER — METHYLPREDNISOLONE SODIUM SUCCINATE 125 MG/2ML
125 INJECTION, POWDER, LYOPHILIZED, FOR SOLUTION INTRAMUSCULAR; INTRAVENOUS
Status: DISCONTINUED | OUTPATIENT
Start: 2018-07-25 | End: 2018-07-25 | Stop reason: HOSPADM

## 2018-07-25 RX ORDER — ACETAMINOPHEN 650 MG/1
650 SUPPOSITORY RECTAL EVERY 4 HOURS PRN
Status: DISCONTINUED | OUTPATIENT
Start: 2018-07-25 | End: 2018-07-28 | Stop reason: HOSPADM

## 2018-07-25 RX ORDER — DEXTROSE MONOHYDRATE 25 G/50ML
25-50 INJECTION, SOLUTION INTRAVENOUS
Status: DISCONTINUED | OUTPATIENT
Start: 2018-07-25 | End: 2018-07-28 | Stop reason: HOSPADM

## 2018-07-25 RX ORDER — TIZANIDINE 2 MG/1
2 TABLET ORAL 2 TIMES DAILY PRN
Status: DISCONTINUED | OUTPATIENT
Start: 2018-07-25 | End: 2018-07-25

## 2018-07-25 RX ORDER — METOPROLOL TARTRATE 1 MG/ML
5 INJECTION, SOLUTION INTRAVENOUS EVERY 5 MIN PRN
Status: DISCONTINUED | OUTPATIENT
Start: 2018-07-25 | End: 2018-07-25 | Stop reason: HOSPADM

## 2018-07-25 RX ORDER — IOPAMIDOL 755 MG/ML
80 INJECTION, SOLUTION INTRAVASCULAR ONCE
Status: COMPLETED | OUTPATIENT
Start: 2018-07-25 | End: 2018-07-25

## 2018-07-25 RX ORDER — HYDROCODONE BITARTRATE AND ACETAMINOPHEN 5; 325 MG/1; MG/1
1-2 TABLET ORAL EVERY 4 HOURS PRN
Status: DISCONTINUED | OUTPATIENT
Start: 2018-07-25 | End: 2018-07-28 | Stop reason: HOSPADM

## 2018-07-25 RX ORDER — ATROPINE SULFATE 0.1 MG/ML
0.5 INJECTION INTRAVENOUS EVERY 5 MIN PRN
Status: ACTIVE | OUTPATIENT
Start: 2018-07-25 | End: 2018-07-26

## 2018-07-25 RX ORDER — CLOPIDOGREL 300 MG/1
300-600 TABLET, FILM COATED ORAL
Status: DISCONTINUED | OUTPATIENT
Start: 2018-07-25 | End: 2018-07-25 | Stop reason: HOSPADM

## 2018-07-25 RX ORDER — NALOXONE HYDROCHLORIDE 0.4 MG/ML
.1-.4 INJECTION, SOLUTION INTRAMUSCULAR; INTRAVENOUS; SUBCUTANEOUS
Status: DISCONTINUED | OUTPATIENT
Start: 2018-07-25 | End: 2018-07-25

## 2018-07-25 RX ORDER — LORAZEPAM 0.5 MG/1
0.5 TABLET ORAL
Status: DISCONTINUED | OUTPATIENT
Start: 2018-07-25 | End: 2018-07-25 | Stop reason: HOSPADM

## 2018-07-25 RX ORDER — AMOXICILLIN 250 MG
2 CAPSULE ORAL 2 TIMES DAILY PRN
Status: DISCONTINUED | OUTPATIENT
Start: 2018-07-25 | End: 2018-07-28 | Stop reason: HOSPADM

## 2018-07-25 RX ORDER — ACETAMINOPHEN 325 MG/1
650 TABLET ORAL EVERY 4 HOURS PRN
Status: DISCONTINUED | OUTPATIENT
Start: 2018-07-25 | End: 2018-07-25

## 2018-07-25 RX ORDER — SIMETHICONE 125 MG
125 TABLET,CHEWABLE ORAL 2 TIMES DAILY
COMMUNITY
End: 2018-07-25

## 2018-07-25 RX ORDER — PROTAMINE SULFATE 10 MG/ML
1-5 INJECTION, SOLUTION INTRAVENOUS
Status: DISCONTINUED | OUTPATIENT
Start: 2018-07-25 | End: 2018-07-25 | Stop reason: HOSPADM

## 2018-07-25 RX ORDER — AMOXICILLIN 250 MG
1 CAPSULE ORAL 2 TIMES DAILY PRN
Status: DISCONTINUED | OUTPATIENT
Start: 2018-07-25 | End: 2018-07-28 | Stop reason: HOSPADM

## 2018-07-25 RX ORDER — ASPIRIN 81 MG/1
81 TABLET ORAL DAILY
Status: DISCONTINUED | OUTPATIENT
Start: 2018-07-26 | End: 2018-07-28 | Stop reason: HOSPADM

## 2018-07-25 RX ORDER — POTASSIUM CHLORIDE 29.8 MG/ML
20 INJECTION INTRAVENOUS
Status: DISCONTINUED | OUTPATIENT
Start: 2018-07-25 | End: 2018-07-25 | Stop reason: HOSPADM

## 2018-07-25 RX ORDER — LIDOCAINE 40 MG/G
CREAM TOPICAL
Status: DISCONTINUED | OUTPATIENT
Start: 2018-07-25 | End: 2018-07-28 | Stop reason: HOSPADM

## 2018-07-25 RX ADMIN — BACLOFEN 20 MG: 20 TABLET ORAL at 21:15

## 2018-07-25 RX ADMIN — DULOXETINE HYDROCHLORIDE 60 MG: 60 CAPSULE, DELAYED RELEASE ORAL at 21:08

## 2018-07-25 RX ADMIN — PREGABALIN 150 MG: 75 CAPSULE ORAL at 21:08

## 2018-07-25 RX ADMIN — SODIUM CHLORIDE, PRESERVATIVE FREE 77 ML: 5 INJECTION INTRAVENOUS at 10:29

## 2018-07-25 RX ADMIN — FENTANYL CITRATE 50 MCG: 50 INJECTION, SOLUTION INTRAMUSCULAR; INTRAVENOUS at 15:27

## 2018-07-25 RX ADMIN — ASPIRIN 325 MG ORAL TABLET 325 MG: 325 PILL ORAL at 11:08

## 2018-07-25 RX ADMIN — IOPAMIDOL 80 ML: 755 INJECTION, SOLUTION INTRAVASCULAR at 15:45

## 2018-07-25 RX ADMIN — HEPARIN SODIUM 900 UNITS/HR: 10000 INJECTION, SOLUTION INTRAVENOUS at 12:00

## 2018-07-25 RX ADMIN — NITROGLYCERIN 0.07 MCG/KG/MIN: 20 INJECTION INTRAVENOUS at 11:59

## 2018-07-25 RX ADMIN — FENTANYL CITRATE 50 MCG: 50 INJECTION, SOLUTION INTRAMUSCULAR; INTRAVENOUS at 15:34

## 2018-07-25 RX ADMIN — TIZANIDINE 2 MG: 2 TABLET ORAL at 15:05

## 2018-07-25 RX ADMIN — SODIUM CHLORIDE: 9 INJECTION, SOLUTION INTRAVENOUS at 14:32

## 2018-07-25 RX ADMIN — HYDROCODONE BITARTRATE AND ACETAMINOPHEN 1 TABLET: 5; 325 TABLET ORAL at 18:57

## 2018-07-25 RX ADMIN — HYDROCODONE BITARTRATE AND ACETAMINOPHEN 1 TABLET: 7.5; 325 TABLET ORAL at 10:49

## 2018-07-25 RX ADMIN — METOPROLOL TARTRATE 50 MG: 50 TABLET ORAL at 21:08

## 2018-07-25 RX ADMIN — NITROGLYCERIN 0.4 MG: 0.4 TABLET SUBLINGUAL at 11:30

## 2018-07-25 RX ADMIN — ACETAMINOPHEN 650 MG: 325 TABLET, FILM COATED ORAL at 21:09

## 2018-07-25 RX ADMIN — WARFARIN SODIUM 7.5 MG: 7.5 TABLET ORAL at 18:05

## 2018-07-25 RX ADMIN — HYDROCODONE BITARTRATE AND ACETAMINOPHEN 1 TABLET: 5; 325 TABLET ORAL at 18:08

## 2018-07-25 RX ADMIN — SIMETHICONE CHEW TAB 80 MG 80 MG: 80 TABLET ORAL at 21:08

## 2018-07-25 RX ADMIN — HUMAN ALBUMIN MICROSPHERES AND PERFLUTREN 9 ML: 10; .22 INJECTION, SOLUTION INTRAVENOUS at 15:00

## 2018-07-25 RX ADMIN — HYDROCODONE BITARTRATE AND ACETAMINOPHEN 2 TABLET: 5; 325 TABLET ORAL at 23:43

## 2018-07-25 RX ADMIN — CETIRIZINE HYDROCHLORIDE 10 MG: 10 TABLET, FILM COATED ORAL at 14:32

## 2018-07-25 RX ADMIN — MIDAZOLAM 2 MG: 1 INJECTION INTRAMUSCULAR; INTRAVENOUS at 15:34

## 2018-07-25 RX ADMIN — Medication 4550 UNITS: at 11:59

## 2018-07-25 RX ADMIN — LIDOCAINE HYDROCHLORIDE 10 ML: 10 INJECTION, SOLUTION EPIDURAL; INFILTRATION; INTRACAUDAL; PERINEURAL at 15:35

## 2018-07-25 RX ADMIN — ATORVASTATIN CALCIUM 40 MG: 40 TABLET, FILM COATED ORAL at 21:08

## 2018-07-25 RX ADMIN — POTASSIUM CHLORIDE 20 MEQ: 1500 TABLET, EXTENDED RELEASE ORAL at 15:05

## 2018-07-25 RX ADMIN — OMEPRAZOLE 20 MG: 20 CAPSULE, DELAYED RELEASE ORAL at 21:08

## 2018-07-25 RX ADMIN — IOPAMIDOL 123 ML: 755 INJECTION, SOLUTION INTRAVENOUS at 10:29

## 2018-07-25 RX ADMIN — PREGABALIN 150 MG: 75 CAPSULE ORAL at 15:05

## 2018-07-25 RX ADMIN — NITROGLYCERIN 0.4 MG: 0.4 TABLET SUBLINGUAL at 11:11

## 2018-07-25 ASSESSMENT — ACTIVITIES OF DAILY LIVING (ADL)
WHICH_OF_THE_ABOVE_FUNCTIONAL_RISKS_HAD_A_RECENT_ONSET_OR_CHANGE?: FALL HISTORY
RETIRED_EATING: 0-->INDEPENDENT
SWALLOWING: 0-->SWALLOWS FOODS/LIQUIDS WITHOUT DIFFICULTY
BATHING: 0-->INDEPENDENT
NUMBER_OF_TIMES_PATIENT_HAS_FALLEN_WITHIN_LAST_SIX_MONTHS: 3
TRANSFERRING: 1-->ASSISTIVE EQUIPMENT
DRESS: 0-->INDEPENDENT
AMBULATION: 1-->ASSISTIVE EQUIPMENT
RETIRED_COMMUNICATION: 0-->UNDERSTANDS/COMMUNICATES WITHOUT DIFFICULTY
FALL_HISTORY_WITHIN_LAST_SIX_MONTHS: YES
TOILETING: 1-->ASSISTIVE EQUIPMENT
COGNITION: 0 - NO COGNITION ISSUES REPORTED

## 2018-07-25 ASSESSMENT — ENCOUNTER SYMPTOMS
BACK PAIN: 1
MYALGIAS: 1
DIAPHORESIS: 1
WEAKNESS: 1
ABDOMINAL PAIN: 1
CHEST TIGHTNESS: 1

## 2018-07-25 ASSESSMENT — PAIN DESCRIPTION - DESCRIPTORS
DESCRIPTORS: SHARP
DESCRIPTORS: PRESSURE

## 2018-07-25 NOTE — CONSULTS
Consult Date:  07/25/2018      CARDIOLOGY CONSULTATION      PATIENT HISTORY:  Ms. Samira Peralta is 63 years old and has been admitted to the hospital with ongoing chest discomfort.  It began about 2 days ago.  Cardiology consultation has been requested by the Hospitalist Service.      Ms. Peralta has a history of deep venous thrombosis and pulmonary embolism for which she is on warfarin.  Her current INR is 1.6 because she missed a dose last night after falling out of bed and remaining on the floor until the morning.  In the light, she could see her purse and call for assistance because her phone was in her purse.      She developed chest discomfort which has been nearly continuously present for the last 2 days.  When she moved around, she felt it was worse.  She has not had radiation of the discomfort.  She has been somewhat more dyspneic with the discomfort.      She denies any prior history of heart disease.  Review of the medical record indicates that she underwent echocardiography 2 years ago at the [facility].  Her left ventricular ejection fraction was decreased at 47% with mid to basal inferoseptal, inferior and inferolateral hypokinesis.  The rhythm was noted to be atrial fibrillation.      During 04/2015, Dr. Julio Vieyra performed an atrial flutter ablation.  Presumably, this was at [facility].  An echocardiogram at that time indicated normal left ventricular systolic performance.      During 07/2016, she was hospitalized in [place] at [facility].  She was transferred from [place] where her son had found her.  Her troponins were mildly elevated and the echocardiogram was done that demonstrated an inferior and inferoseptal wall motion abnormality with a mild to moderate decrease in systolic performance.  She was noted to be in atrial fibrillation with a rapid ventricular response rate treated with diltiazem.  She also experienced a small-bowel obstruction  secondary to adhesions.  She was on methadone at that time for chronic pain management and the plan was to wean off the methadone.  During the hospitalization, she experienced an acute stroke with left hemiparesis which was thought to be cardioembolic and a result of atrial fibrillation and a subtherapeutic INR.  A cerebral angiogram demonstrated a complete occlusion of the distal M1 segment of the right middle cerebral artery.  She underwent mechanical thrombectomy and recanalization and her hemiparesis improved.  She has chronic lower extremity edema and has been suspected to have a prior cellulitis.  She gives a history of deep venous thrombosis and pulmonary embolism in the past.  An EKG from 2 months ago indicated atrial fibrillation with a controlled ventricular response rate, narrow QRS and normal QT interval.      MEDICATIONS:   1.  Tylenol 500 mg p.o. b.i.d.   2.  Ventolin inhaler 1-2 puffs every 6 hours p.r.n.   3.  Alprazolam 0.5 mg p.o. daily.   4.  Baclofen 20 mg p.o. t.i.d.   5.  Zyrtec 10 mg p.o. daily.   6.  Voltaren 1% gel topical p.r.n. back pain.   7.  Cymbalta 60 mg p.o. b.i.d.   8.  Lasix 20 mg p.o. daily.   9.  Hydrocodone/acetaminophen 7.5/325 mg p.o. t.i.d.   10.  Metoprolol tartrate 50 mg p.o. b.i.d.   11.  Omeprazole 20 mg p.o. b.i.d.   12.  MiraLax 1 packet daily p.o. p.r.n.   13.  Potassium chloride 20 mEq p.o. daily.   14.  Lyrica 150 mg p.o. t.i.d.   15.  Simethicone-80 orally 80 mg b.i.d.   16.  Zanaflex 4 mg p.o. b.i.d.   17.  Warfarin as prescribed to maintain an INR of 2-3.      ALLERGIES:  ACE INHIBITORS, MORPHINE WHICH CAUSED DEPRESSED BREATHING AND POLLEN EXTRACT.      SOCIAL HISTORY:  The patient has previously been evicted from an apartment where she lived with her sister.  Her sister just left town.  She has never smoked.  She occasionally uses alcohol.      FAMILY HISTORY:  She states that her maternal family history is significant for coronary disease in the women and her  father had atrial fibrillation.      PAST MEDICAL HISTORY:   1.  History of atrial fibrillation and flutter, status post prior atrial flutter ablation in 2015.   2.  History of deep venous thrombosis and pulmonary embolism by her history.   3.  Obstructive sleep apnea.   4.  History of hypertension.   5.  History of gastroesophageal reflux disease.   6.  Chronic pain with low back pain.   7.  Brachial plexitis.   8.  History of headaches.   9.  History of breast reduction surgery.   10.  Prior hysterectomy.   11.  Prior left knee orthopedic surgery.      REVIEW OF SYSTEMS:   CONSTITUTIONAL:  Notable for fatigue.   RESPIRATORY:  Notable for dyspnea.   CARDIAC:  described in the HPI.   MUSCULOSKELETAL:  Notable for a fall from bed which left her on the floor overnight and exacerbated her low back pain.  She has chronic lower extremity edema.   ENDOCRINE:  Negative for diabetes mellitus or known thyroid disease.   GASTROINTESTINAL:  Negative for blood in her stool.   GENITOURINARY:  Negative for blood in her urine.   NEUROLOGIC:  Notable for a prior history of stroke, as delineated above.  The remainder of the 10-point review of systems is negative except as noted.      PHYSICAL EXAMINATION:   GENERAL:  This is a woman who appears uncomfortable with chest discomfort.   VITAL SIGNS:  Blood pressure was 142/92 mmHg, heart rate 82 beats per minute and regular, respiratory rate 16-22 per minute.   HEENT:  Head was normocephalic.  The eyes were clear without scleral icterus.   NECK:  Notable for an absence of thyromegaly.  Carotid upstrokes were normal and there were no bruits.     LUNGS:  Auscultation of lungs revealed the lungs to be clear and breathing was unlabored.   CARDIAC:  Auscultation revealed an S1 and an S2 without extra sounds or murmur.   ABDOMEN:  There were no bruits.   EXTREMITIES:  On examination of lower extremities, there were chronic venous stasis changes from the mid-calf over the dorsum of the feet.   There was lower extremity edema which was moderate and pitting.   NEUROLOGIC:  Revealed symmetric facies and the patient was able to move all extremities.      LABORATORY DATA:  EKG was as described.  Other laboratories include a sodium 135, potassium 3.8, BUN 10 and creatinine 0.67.  White blood cell count was 7.4, hemoglobin 12.5 and platelet count 194,000.  The INR was 1.65.  The TSH was 1.65 from 2 months ago.  The troponin was 1.6.      ASSESSMENT AND RECOMMENDATIONS:  This is an individual with ongoing chest discomfort.  The EKG is most suspicious for a myopathy.  An echocardiogram has been ordered to be done urgently and has not been reviewed by myself. The patient has ongoing chest discomfort and hypertension.  Beta blockade is recommended.  She has been initiated on intravenous heparin.  Aspirin will be used.  It is recommended that the patient undergo coronary angiography and the risks, benefits and alternatives were discussed.  The risks, benefits and alternatives of coronary intervention and enhanced antiplatelet therapy, particularly in the presence of concurrent full anticoagulation, were discussed extensively and reviewed with the patient.  She has agreed to proceed.      Further recommendations will be discussed with the admitting service and with the interventional cardiologist.      This is a patient with risk factors for coronary artery disease who has previously had an inferior wall motion abnormality which apparently resolved.  It is entirely possible that this is a stress myopathy, particularly given that she has had 2 days of ongoing chest discomfort with a relatively low troponin and without typical ischemic changes.         JIM BONILLA MD            D: 2018   T: 2018   MT: MUSTAPHA      Name:     KARLA CHÁVEZ   MRN:      [id number]        Account:       [id number]   :      1955           Consult Date:  2018      Document: [id number]       cc: Sudeep Willis  [facility]

## 2018-07-25 NOTE — IP AVS SNAPSHOT
"                  MRN:[id number]                      After Visit Summary   7/25/2018    Samira Peralta    MRN: [id number]           Thank you!     Thank you for choosing [facility] for your care. Our goal is always to provide you with excellent care. Hearing back from our patients is one way we can continue to improve our services. Please take a few minutes to complete the written survey that you may receive in the mail after you visit with us. Thank you!        Patient Information     Date Of Birth          1955        Designated Caregiver       Most Recent Value    Caregiver    Will someone help with your care after discharge? yes    Name of designated caregiver Dayna Wolff    Phone number of caregiver see chart    Caregiver address see chart      About your hospital stay     You were admitted on:  July 25, 2018 You last received care in the:  [place] [address] [facility]    You were discharged on:  July 28, 2018        Reason for your hospital stay       You were hospitalized for chest pain and found to have a \"stress cardiomyopathy\", which is dysfunction and injury of the heart, but not due to blockages in heart arteries.                  Who to Call     For medical emergencies, please call 911.  For non-urgent questions about your medical care, please call your primary care provider or clinic, [phone]          Attending Provider     Provider Specialty    Melina Elder MD Emergency Medicine    [facility]       Primary Care Provider Office Phone # Fax #    [facility] [phone]      After Care Instructions     Activity       Your activity upon discharge: activity as tolerated            Diet       Follow this diet upon discharge: Moderate consistent carbohydrate, 2 gram sodium restricted diet                  Follow-up Appointments     Follow-up and recommended labs and tests        Follow up with " primary care provider, [facility], within 7 days for hospital follow-up. Check EKG to follow-up QTc with risperidol. Have your INR checked on 7/30/18 through your usual anticoagulation clinic. Follow-up with cardiology as scheduled 8/7/18, or if you prefer to be seen by the [facility] cardiology group call the clinic to schedule an appointment within 4 weeks.                  Further instructions from your care team       Valuables envelope in Security Dept.    Check INR on Monday 7/30/18.    Warfarin Instruction     You have started taking a medicine called warfarin. This is a blood-thinning medicine (anticoagulant). It helps prevent and treat blood clots.      Before leaving the hospital, make sure you know how much to take and how long to take it.      You will need regular blood tests to make sure your blood is clotting safely. It is very important to see your doctor for regular blood tests.    Talk to your doctor before taking any new medicine (this includes over-the-counter drugs and herbal products). Many medicines can interact with warfarin. This may cause more bleeding or too much clotting.     Eating a lot of vitamin K--found in green, leafy vegetables--can change the way warfarin works in your body. Do NOT avoid these foods. Instead, try to eat the same amount each day.     Bleeding is the most common side-effect of warfarin. You may notice bleeding gums, a bloody nose, bruises and bleeding longer when you cut yourself. See a doctor at once if:   o You cough up blood  o You find blood in your stool (poop)  o You have a deep cut, or a cut that bleeds longer than 10 minutes   o You have a bad cut, hard fall, accident or hit your head (go to urgent care or the emergency room).    For women who can get pregnant: This medicine can harm an unborn baby. Be very careful not to get pregnant while taking this medicine. If you think you might be pregnant, call your doctor right  "away.    For more information, read \"Guide to Warfarin Therapy,  the booklet you received in the hospital.        Pending Results     Date and Time Order Name Status Description    2018 2300 EKG 12-lead, tracing only Preliminary             Statement of Approval     Ordered          18 1154  I have reviewed and agree with all the recommendations and orders detailed in this document.  EFFECTIVE NOW     Approved and electronically signed by:  Sudeep Willis MD             Admission Information     Date & Time Provider Department Dept. Phone    2018 Sudeep Willis MD [place] [address] [facility] [phone]      Your Vitals Were     Blood Pressure Pulse Temperature Respirations Height Weight    128/59 (BP Location: Left arm) 66 98.3  F (36.8  C) (Oral) 16 1.6 m (5' 3\") 114.2 kg (251 lb 12.3 oz)    Pulse Oximetry BMI (Body Mass Index)                95% 44.6 kg/m2          [facility] Information     Remote lets you send messages to your doctor, view your test results, renew your prescriptions, schedule appointments and more. To sign up, go to [email or web address] . Click on \"Log in\" on the left side of the screen, which will take you to the Welcome page. Then click on \"Sign up Now\" on the right side of the page.     You will be asked to enter the access code listed below, as well as some personal information. Please follow the directions to create your username and password.     Your access code is: [email or web address]  Expires: 2018  1:16 PM     Your access code will  in 90 days. If you need help or a new code, please call your [facility] clinic or [phone].        Care EveryWhere ID     This is your Care EveryWhere ID. This could be used by other organizations to access your [facility] medical records  [id number]        Equal Access to Services     [place] AH: Shyanne Rios, gene her, qamohan alvarez, maulik echols " ladayana ahBethany So [facility] [phone].    ATENCIÓN: Si habla estee, tiene a mercedes disposición servicios gratuitos de asistencia lingüística. Llame al [phone].    We comply with applicable federal civil rights laws and [place] laws. We do not discriminate on the basis of race, color, national origin, age, disability, sex, sexual orientation, or gender identity.               Review of your medicines      START taking        Dose / Directions    alcohol swab prep pads   Used for:  Type 2 diabetes mellitus without complication, without long-term current use of insulin (H)        Use to swab area of injection/julio cesar as directed.   Quantity:  100 each   Refills:  3       atorvastatin 40 MG tablet   Commonly known as:  LIPITOR   Used for:  Mixed hyperlipidemia        Dose:  40 mg   Take 1 tablet (40 mg) by mouth every evening   Quantity:  90 tablet   Refills:  0       blood glucose calibration solution   Commonly known as:  NO BRAND SPECIFIED   Used for:  Type 2 diabetes mellitus without complication, without long-term current use of insulin (H)        Use to calibrate blood glucose monitor as needed as directed.   Quantity:  1 Bottle   Refills:  3       blood glucose monitoring meter device kit   Commonly known as:  no brand specified   Used for:  Type 2 diabetes mellitus without complication, without long-term current use of insulin (H)        Use to test blood sugar once daily in the morning   Quantity:  1 kit   Refills:  0       blood glucose monitoring test strip   Commonly known as:  no brand specified   Used for:  Type 2 diabetes mellitus without complication, without long-term current use of insulin (H)        Use to test blood sugar once daily in the morning before breakfast   Quantity:  100 strip   Refills:  6       metFORMIN 500 MG tablet   Commonly known as:  GLUCOPHAGE   Used for:  Type 2 diabetes mellitus without complication, without long-term current use of insulin (H)   Notes to Patient:  Please see tapered  dosing!        Take 500mg twice daily for 1 week, then 500mg in AM and 1000mg in PM for 1 week, then 1000mg twice daily.   Quantity:  100 tablet   Refills:  0       risperiDONE 1 MG tablet   Commonly known as:  risperDAL   Used for:  Psychosis, unspecified psychosis type        Dose:  1 mg   Take 1 tablet (1 mg) by mouth At Bedtime   Quantity:  60 tablet   Refills:  0       thin lancets   Commonly known as:  NO BRAND SPECIFIED   Used for:  Type 2 diabetes mellitus without complication, without long-term current use of insulin (H)   Notes to Patient:  Blood sugar checks per orders        Use with lanceting device.   Quantity:  100 each   Refills:  0         CONTINUE these medicines which may have CHANGED, or have new prescriptions. If we are uncertain of the size of tablets/capsules you have at home, strength may be listed as something that might have changed.        Dose / Directions    Metoprolol Tartrate 75 MG Tabs   This may have changed:    - medication strength  - how much to take   Used for:  Atrial fibrillation, unspecified type (H)        Dose:  75 mg   Take 75 mg by mouth 2 times daily   Quantity:  180 tablet   Refills:  0         CONTINUE these medicines which have NOT CHANGED        Dose / Directions    ALPRAZolam 0.5 MG tablet   Commonly known as:  XANAX   Used for:  Dermatitis, perioral, Rosacea, Chondritis        Dose:  0.5 mg   Take 0.5 mg by mouth daily as needed   Refills:  0       baclofen 20 MG tablet   Commonly known as:  LIORESAL   Used for:  Dermatitis, perioral, Rosacea, Chondritis   Notes to Patient:  Resume per orders        Dose:  20 mg   Take 20 mg by mouth 3 times daily.   Refills:  0       cetirizine 10 MG tablet   Commonly known as:  zyrTEC        Dose:  10 mg   Take 10 mg by mouth daily   Refills:  0       CYMBALTA 30 MG EC capsule   Used for:  Dermatitis, perioral, Rosacea, Chondritis   Generic drug:  DULoxetine        Dose:  60 mg   Take 60 mg by mouth 2 times daily   Refills:  0        HYDROcodone-acetaminophen 7.5-325 MG per tablet   Commonly known as:  NORCO   Notes to Patient:  Resume per previous schedule        Dose:  1 tablet   Take 1 tablet by mouth 3 times daily   Refills:  0       hydrocortisone 1 % Crea cream        Apply topically 2 times daily as needed for other (to face)   Refills:  0       LASIX PO        Dose:  20 mg   Take 20 mg by mouth daily   Refills:  0       LYRICA 100 MG capsule   Used for:  Dermatitis, perioral, Rosacea, Chondritis   Generic drug:  pregabalin   Notes to Patient:  Resume per previous schedule        Dose:  150 mg   Take 150 mg by mouth 3 times daily   Refills:  0       multivitamin, therapeutic Tabs tablet   Notes to Patient:  Resume per previous schedule        Dose:  1 tablet   Take 1 tablet by mouth daily   Refills:  0       omeprazole 20 MG tablet   Used for:  Dermatitis, perioral, Rosacea, Chondritis   Notes to Patient:  Resume per previous schedule        Dose:  20 mg   Take 20 mg by mouth 2 times daily.   Refills:  0       polyethylene glycol Packet   Commonly known as:  MIRALAX/GLYCOLAX        Dose:  1 packet   Take 1 packet by mouth daily as needed   Refills:  0       potassium chloride SA 10 MEQ CR tablet   Commonly known as:  K-DUR/KLOR-CON M   Notes to Patient:  Resume per previous schedule        Dose:  20 mEq   Take 20 mEq by mouth daily   Refills:  0       SIMETHICONE-80 PO        Dose:  80 mg   Take 80 mg by mouth 2 times daily   Refills:  0       tiZANidine 4 MG tablet   Commonly known as:  ZANAFLEX   Used for:  Dermatitis, perioral, Rosacea, Chondritis        Dose:  4 mg   Take 4 mg by mouth 2 times daily as needed   Refills:  0       TYLENOL PO        Dose:  500 mg   Take 500 mg by mouth 2 times daily as needed for mild pain or fever   Refills:  0       VENTOLIN IN        Dose:  1-2 puff   Inhale 1-2 puffs into the lungs every 6 hours as needed   Refills:  0       VOLTAREN 1 % Gel topical gel   Used for:  Dermatitis, perioral, Rosacea,  Chondritis   Generic drug:  diclofenac        Apply topically daily as needed for moderate pain (Apply to back)   Refills:  0       WARFARIN SODIUM PO   Notes to Patient:  Please see dosing!        Take by mouth daily M and Fri 2.5mg, all other days 5mg   Refills:  0            Where to get your medicines      These medications were sent to [facility] [place], [place] - [address]  [address], [place] [address]     Phone:  [phone]     alcohol swab prep pads    atorvastatin 40 MG tablet    blood glucose calibration solution    blood glucose monitoring meter device kit    blood glucose monitoring test strip    metFORMIN 500 MG tablet    Metoprolol Tartrate 75 MG Tabs    thin lancets         Some of these will need a paper prescription and others can be bought over the counter. Ask your nurse if you have questions.     You don't need a prescription for these medications     risperiDONE 1 MG tablet                Protect others around you: Learn how to safely use, store and throw away your medicines at www.disposemymeds.org.        Information about OPIOIDS     PRESCRIPTION OPIOIDS: WHAT YOU NEED TO KNOW   We gave you an opioid (narcotic) pain medicine. It is important to manage your pain, but opioids are not always the best choice. You should first try all the other options your care team gave you. Take this medicine for as short a time (and as few doses) as possible.     These medicines have risks:    DO NOT drive when on new or higher doses of pain medicine. These medicines can affect your alertness and reaction times, and you could be arrested for driving under the influence (DUI). If you need to use opioids long-term, talk to your care team about driving.    DO NOT operate heave machinery    DO NOT do any other dangerous activities while taking these medicines.     DO NOT drink any alcohol while taking these medicines.      If the opioid prescribed includes acetaminophen, DO NOT  take with any other medicines that contain acetaminophen. Read all labels carefully. Look for the word  acetaminophen  or  Tylenol.  Ask your pharmacist if you have questions or are unsure.    You can get addicted to pain medicines, especially if you have a history of addiction (chemical, alcohol or substance dependence). Talk to your care team about ways to reduce this risk.    Store your pills in a secure place, locked if possible. We will not replace any lost or stolen medicine. If you don t finish your medicine, please throw away (dispose) as directed by your pharmacist. The [place] Pollution Control Agency has more information about safe disposal: https://www.pca.[facility].[place].us/living-green/managing-unwanted-medications.     All opioids tend to cause constipation. Drink plenty of water and eat foods that have a lot of fiber, such as fruits, vegetables, prune juice, apple juice and high-fiber cereal. Take a laxative (Miralax, milk of magnesia, Colace, Senna) if you don t move your bowels at least every other day.              Medication List: This is a list of all your medications and when to take them. Check marks below indicate your daily home schedule. Keep this list as a reference.      Medications           Morning Afternoon Evening Bedtime As Needed    alcohol swab prep pads   Use to swab area of injection/julio cesar as directed.                                            ALPRAZolam 0.5 MG tablet   Commonly known as:  XANAX   Take 0.5 mg by mouth daily as needed                                   atorvastatin 40 MG tablet   Commonly known as:  LIPITOR   Take 1 tablet (40 mg) by mouth every evening   Last time this was given:  40 mg on 7/27/2018  8:23 PM   Next Dose Due:  7/28/18 @2000                                   baclofen 20 MG tablet   Commonly known as:  LIORESAL   Take 20 mg by mouth 3 times daily.   Last time this was given:  20 mg on 7/27/2018  2:39 PM   Notes to Patient:  Resume per orders                                          blood glucose calibration solution   Commonly known as:  NO BRAND SPECIFIED   Use to calibrate blood glucose monitor as needed as directed.                                blood glucose monitoring meter device kit   Commonly known as:  no brand specified   Use to test blood sugar once daily in the morning                                blood glucose monitoring test strip   Commonly known as:  no brand specified   Use to test blood sugar once daily in the morning before breakfast                                cetirizine 10 MG tablet   Commonly known as:  zyrTEC   Take 10 mg by mouth daily   Last time this was given:  10 mg on 7/28/2018  9:38 AM   Next Dose Due:  7/29/18 @0900                                CYMBALTA 30 MG EC capsule   Take 60 mg by mouth 2 times daily   Last time this was given:  60 mg on 7/28/2018  9:38 AM   Generic drug:  DULoxetine   Next Dose Due:  7/28/18 @2000                                      HYDROcodone-acetaminophen 7.5-325 MG per tablet   Commonly known as:  NORCO   Take 1 tablet by mouth 3 times daily   Last time this was given:  1 tablet on 7/25/2018 10:49 AM   Notes to Patient:  Resume per previous schedule                                         hydrocortisone 1 % Crea cream   Apply topically 2 times daily as needed for other (to face)                                   LASIX PO   Take 20 mg by mouth daily   Last time this was given:  20 mg on 7/28/2018  9:38 AM   Next Dose Due:  7/29/18 @0900                                   LYRICA 100 MG capsule   Take 150 mg by mouth 3 times daily   Last time this was given:  150 mg on 7/28/2018  9:39 AM   Generic drug:  pregabalin   Notes to Patient:  Resume per previous schedule                                         metFORMIN 500 MG tablet   Commonly known as:  GLUCOPHAGE   Take 500mg twice daily for 1 week, then 500mg in AM and 1000mg in PM for 1 week, then 1000mg twice daily.   Notes to Patient:  Please see tapered  dosing!                                      Metoprolol Tartrate 75 MG Tabs   Take 75 mg by mouth 2 times daily   Last time this was given:  75 mg on 7/28/2018  9:42 AM   Next Dose Due:  7/28/18 @25537                                      multivitamin, therapeutic Tabs tablet   Take 1 tablet by mouth daily   Notes to Patient:  Resume per previous schedule                                omeprazole 20 MG tablet   Take 20 mg by mouth 2 times daily.   Notes to Patient:  Resume per previous schedule                                      polyethylene glycol Packet   Commonly known as:  MIRALAX/GLYCOLAX   Take 1 packet by mouth daily as needed                                   potassium chloride SA 10 MEQ CR tablet   Commonly known as:  K-DUR/KLOR-CON M   Take 20 mEq by mouth daily   Last time this was given:  20 mEq on 7/25/2018  3:05 PM   Notes to Patient:  Resume per previous schedule                                risperiDONE 1 MG tablet   Commonly known as:  risperDAL   Take 1 tablet (1 mg) by mouth At Bedtime   Last time this was given:  1 mg on 7/27/2018  9:27 PM   Next Dose Due:  7/28/18 @2100                                   SIMETHICONE-80 PO   Take 80 mg by mouth 2 times daily   Last time this was given:  80 mg on 7/28/2018  9:38 AM   Next Dose Due:  7/28/18 @2000                                      thin lancets   Commonly known as:  NO BRAND SPECIFIED   Use with lanceting device.   Notes to Patient:  Blood sugar checks per orders                                   tiZANidine 4 MG tablet   Commonly known as:  ZANAFLEX   Take 4 mg by mouth 2 times daily as needed   Last time this was given:  2 mg on 7/27/2018  9:27 PM                                   TYLENOL PO   Take 500 mg by mouth 2 times daily as needed for mild pain or fever   Last time this was given:  650 mg on 7/25/2018  9:09 PM                                   VENTOLIN IN   Inhale 1-2 puffs into the lungs every 6 hours as needed                                    VOLTAREN 1 % Gel topical gel   Apply topically daily as needed for moderate pain (Apply to back)   Generic drug:  diclofenac                                   WARFARIN SODIUM PO   Take by mouth daily M and Fri 2.5mg, all other days 5mg   Last time this was given:  5 mg on 7/27/2018  5:25 PM   Next Dose Due:  7/28/18 @1800   Notes to Patient:  Please see dosing!                                             More Information        Fall Prevention  Falls often occur due to slipping, tripping or losing your balance. Millions of people fall every year and injure themselves. Here are ways to reduce your risk of falling again.    Think about your fall, was there anything that caused your fall that can be fixed, removed, or replaced?    Make your home safe by keeping walkways clear of objects you may trip over.    Use non-slip pads under rugs. Do not use area rugs or small throw rugs.    Use non-slip mats in bathtubs and showers.    Install handrails and lights on staircases.    Do not walk in poorly lit areas.    Do not stand on chairs or wobbly ladders.    Use caution when reaching overhead or looking upward. This position can cause a loss of balance.    Be sure your shoes fit properly, have non-slip bottoms and are in good condition.     Wear shoes both inside and out. Avoid going barefoot or wearing slippers.    Be cautious when going up and down stairs, curbs, and when walking on uneven sidewalks.    If your balance is poor, consider using a cane or walker.    If your fall was related to alcohol use, stop or limit alcohol intake.     If your fall was related to use of sleeping medicines, talk to your doctor about this. You may need to reduce your dosage at bedtime if you awaken during the night to go to the bathroom.      To reduce the need for nighttime bathroom trips:  ? Avoid drinking fluids for several hours before going to bed  ? Empty your bladder before going to bed  ? Men can keep a urinal at the  bedside    Stay as active as you can. Balance, flexibility, strength, and endurance all come from exercise. They all play a role in preventing falls. Ask your healthcare provider which types of activity are right for you.    Get your vision checked on a regular basis.    If you have pets, know where they are before you stand up or walk so you don't trip over them.    Use night lights.  Date Last Reviewed: 11/5/2015 2000-2017 [employer]. [address] [place] [address], [place] [id number]. All rights reserved. This information is not intended as a substitute for professional medical care. Always follow your healthcare professional's instructions.                Diabetes and Heart Disease     Take your medicines as directed each day, even if you feel fine.     If you have diabetes, you are two to four times more likely to have heart disease than someone without diabetes. This higher risk is due to diabetes, but it is also due to other risk factors for heart disease that happen in people with diabetes. But there s good news. You can help control your health risks by making some changes in your life. You can take steps to reduce your risk of heart disease by half--similar to the risk in people who don't have diabetes.  Your main risk factors  Three major risk factors for heart disease are high blood sugar, high blood pressure, and high levels of lipids. By keeping risk factors under control, you can help keep your heart and arteries healthy. This may reduce your chances of a heart attack.    Blood sugar. High blood sugar can make artery walls tough and rough. Plaque (waxy material in the blood) can then build up along the artery walls, making it harder for blood to flow through the arteries. Having high blood sugar increases the chances of having high blood pressure and high cholesterol.    Blood pressure. When blood pressure is high all the time it causes your heart to work harder to pump blood. Artery walls  become damaged. This increases the risk for plaque build up.    Lipids. The body needs some lipids in the blood to stay healthy. But lipid levels that are too high can damage the artery walls. Lipids include cholesterol and triglycerides. There are two kinds of cholesterol. LDL ( bad ) cholesterol can damage the arteries. But HDL ( good ) cholesterol helps clear LDL cholesterol from the blood vessels. This helps keep the arteries healthy. When blood sugar is high, the level of triglycerides in the blood may also be high. High blood triglyceride levels can cause plaque to form.   Other risk factors  Certain lifestyle factors can increase levels of your blood sugar, blood pressure, and lipids. Such increases raise your risk of heart disease:    Smoking damages the lining of your arteries. This allows plaque to build up in the artery walls. Smoking also constricts (narrows) the arteries. This can raise blood pressure and cause chest pain or angina. Smoking also increases your risk of getting type 2 diabetes.    Not being active makes it harder for your heart to do its work. Inactivity is linked to many other risk factors, such as high blood pressure and poor cholesterol levels. Inactivity also increases your risk of getting type 2 diabetes.    Being overweight makes it harder for your body to use insulin. It also makes your heart work too hard. Being overweight is also the main contributor to the development of type 2 diabetes,   Changes you can make  Following a few simple steps can help keep your risk factors under control. Work with your healthcare team to reach your goals.    Quitting smoking could save your life. Smoking damages the lining of the blood vessels and raises blood pressure. Smoking also affects how your body uses insulin. This makes it harder to keep blood sugar under control. If you smoke and need help quitting, talk to your healthcare team.     Testing your blood sugar is the only way to know  whether it is under control. Be sure to test your blood sugar yourself. Also get your blood tested in the lab, as directed.    Monitoring your blood pressure and lipid levels can help you achieve safe levels. Visit your healthcare team as scheduled.    Taking medicines as directed can help control blood sugar, blood pressure, blood clotting, and/or cholesterol levels.    Eating right can reduce your risk factors and help you lose weight. Try to limit the amount of processed or refined carbohydrates you eat at one time. Cut back on your total calorie intake. Eat foods low in saturated fat and cholesterol. Eat fiber, including vegetables and whole grains, and cut down on salt. A dietitian or diabetes educator can help form a meal plan that works for you--even if you are on a low budget.     Being active can help reduce your weight, strengthen your heart, and lower your lipid levels and blood pressure. Exercise and activity are good for your whole body. Talk to your healthcare team about increasing your activity safely over time.    Keeping your appointments with your healthcare provider helps you stay healthy. Go in for checkups and lab tests as scheduled.  Date Last Reviewed: 5/19/2016 2000-2017 [employer]. [address] [place] [address]. All rights reserved. This information is not intended as a substitute for professional medical care. Always follow your healthcare professional's instructions.                Understanding Carbohydrates, Fats, and Protein  Food is a source of fuel and nourishment for your body. It s also a source of pleasure. Having diabetes doesn t mean you have to eat special foods or give up desserts. Instead, your dietitian can show you how to plan meals to suit your body. To start, learn how different foods affect blood sugar.  Carbohydrates  Carbohydrates are the main source of fuel for the body. Carbohydrates raise blood sugar. Many people think carbohydrates are  only found in pasta or bread. But carbohydrates are actually in many kinds of foods:    Sugars occur naturally in foods such as fruit, milk, honey, and molasses. Sugars can also be added to many foods, from cereals and yogurt to candy and desserts. Sugars raise blood sugar.    Starches are found in bread, cereals, pasta, and dried beans. They re also found in corn, peas, potatoes, yam, acorn squash, and butternut squash. Starches also raise blood sugar.     Fiber is found in foods such as vegetables, fruits, beans, and whole grains. Unlike other carbs, fiber isn t digested or absorbed. So it doesn t raise blood sugar. In fact, fiber can help keep blood sugar from rising too fast. It also helps keep blood cholesterol at a healthy level.  Did you know?  Even though carbohydrates raise blood sugar, it s best to have some in every meal. They are an important part of a healthy diet.   Fat  Fat is an energy source that can be stored until needed. Fat does not raise blood sugar. However, it can raise blood cholesterol, increasing the risk of heart disease. Fat is also high in calories, which can cause weight gain. Not all types of fat are the same.  More Healthy:    Monounsaturated fats are mostly found in vegetable oils, such as olive, canola, and peanut oils. They are also found in avocados and some nuts. Monounsaturated fats are healthy for your heart. That s because they lower LDL (unhealthy) cholesterol.    Polyunsaturated fats are mostly found in vegetable oils, such as corn, safflower, and soybean oils. They are also found in some seeds, nuts, and fish. Polyunsaturated fats lower LDL (unhealthy) cholesterol. So, choosing them instead of saturated fats is healthy for your heart. Certain unsaturated fats can help lower triglycerides.   Less Healthy:    Saturated fats are found in animal products, such as meat, poultry, whole milk, lard, and butter. Saturated fats raise LDL cholesterol and are not healthy for your  heart.    Hydrogenated oils and trans fats are formed when vegetable oils are processed into solid fats. They are found in many processed foods. Hydrogenated oils and trans fats raise LDL cholesterol and lower HDL (healthy) cholesterol. They are not healthy for your heart.  Protein  Protein helps the body build and repair muscle and other tissue. Protein has little or no effect on blood sugar. However, many foods that contain protein also contain saturated fat. By choosing low-fat protein sources, you can get the benefits of protein without the extra fat:    Plant protein is found in dry beans and peas, nuts, and soy products, such as tofu and soymilk. These sources tend to be cholesterol-free and low in saturated fat.    Animal protein is found in fish, poultry, meat, cheese, milk, and eggs. These contain cholesterol and can be high in saturated fat. Aim for lean, lower-fat choices.  Date Last Reviewed: 3/1/2016    [id number] [employer]. [address] [place] [address]. All rights reserved. This information is not intended as a substitute for professional medical care. Always follow your healthcare professional's instructions.                Diet: Diabetes  Food is an important tool that you can use to control diabetes and stay healthy. Eating well-balanced meals in the correct amounts will help you control your blood glucose levels and prevent low blood sugar reactions. It will also help you reduce the health risks of diabetes. There is no one specific diet that is right for everyone with diabetes. But there are general guidelines to follow. A registered dietitian (RD) will create a tailored diet approach that s just right for you. He or she will also help you plan healthy meals and snacks. If you have any questions, call your dietitian for advice.     Guidelines for success  Talk with your healthcare provider before starting a diabetes diet or weight loss program. If you haven't talked  with a dietitian yet, ask your provider for a referral. The following guidelines can help you succeed:    Select foods from the 6 food groups below. Your dietitian will help you find food choices within each group. He or she will also show you serving sizes and how many servings you can have at each meal.  ? Grains, beans, and starchy vegetables  ? Vegetables  ? Fruit  ? Milk or yogurt  ? Meat, poultry, fish, or tofu  ? Healthy fats    Check your blood sugar levels as directed by your provider. Take any medicine as prescribed by your provider.    Learn to read food labels and pick the right portion sizes.    Eat only the amount of food in your meal plan. Eat about the same amount of food at regular times each day. Don t skip meals. Eat meals 4 to 5 hours apart, with snacks in between.    Limit alcohol. It raises blood sugar levels. Drink water or calorie-free diet drinks that use safe sweeteners.    Eat less fat to help lower your risk of heart disease. Use nonfat or low-fat dairy products and lean meats. Avoid fried foods. Use cooking oils that are unsaturated, such as olive, canola, or peanut oil.    Talk with your dietitian about safe sugar substitutes.    Avoid added salt. It can contribute to high blood pressure, which can cause heart disease. People with diabetes already have a risk of high blood pressure and heart disease.    Stay at a healthy weight. If you need to lose weight, cut down on your portion sizes. But don t skip meals. Exercise is an important part of any weight management program. Talk with your provider about an exercise program that s right for you.    For more information about the best diet plan for you, talk with a registered dietitian (RD). To find an RD in your area, contact:  ? Academy of Nutrition and Dietetics www.eatright.org  ? The American Diabetes Association [phone] www.diabetes.org  Date Last Reviewed: 8/1/2016 2000-2017 [facility]. [address] [place] [address],  [place] [address]. All rights reserved. This information is not intended as a substitute for professional medical care. Always follow your healthcare professional's instructions.                Getting Support When You Have Diabetes    The job of controlling your blood sugar is mostly up to you. But your diabetes healthcare team is there to help. These experts will teach you how to manage diabetes and the health risks it brings. With practice, controlling your blood sugar will become a habit.  Working with your healthcare team  Your diabetes healthcare team will work closely with you to create a management plan. The goal is to keep your blood sugar controlled to delay or prevent other health problems. Your healthcare team is likely to include:    A primary care provider, who might be your regular healthcare provider. This may have been the first person to tell you about diabetes.    An endocrinologist, a healthcare provider specialized in treating people with diabetes, though most people with diabetes may not need to see an endocrinologist.     A registered dietitian, who will teach you how food and healthy eating can help you control blood sugar.    A diabetes educator, who might be a nurse, dietitian, or pharmacist, will teach you all about managing diabetes.    A health psychologist or , who can help you cope with the feelings and stresses that diabetes may bring.    Other healthcare team members can include an eye healthcare provider, dentist, podiatrist, pharmacist, occupational therapist, and exercise physiologist   Who else can help?  Daily diabetes management can be a lot to handle. It might affect your routine, at home and on the job. Don t be afraid to let your family, friends, and work supervisor know about your condition. These people can support your need to stick to a schedule that meets your treatment plan.  Reach out to your family and friends  Family and friends can support your efforts  to take care of yourself. So don t feel bad asking for help when you need it. If they have questions or aren t taking your diagnosis seriously, ask them to learn along with you. At first, it might be hard for them to understand some of the changes you are making. Tell them that your health is your priority. Their support can help keep you focused and confident as you learn to control your blood sugar.  Date Last Reviewed: 6/1/2016 2000-2017 [employer]. [address] [place] [address]. All rights reserved. This information is not intended as a substitute for professional medical care. Always follow your healthcare professional's instructions.                Managing Diabetes: The A1C Test       Healthy red blood cells have some glucose stuck to them. A high A1C means that unhealthy amounts of glucose are stuck to the cells.   What is the A1C test?  Using your meter helps you track your blood sugar every day. But your glucose meter tells you the value at the time of testing only. You also need to know if your treatment plan is keeping you healthy over time. The hemoglobin A1C (or glycated hemoglobin) test can help. This test measures your average blood sugar level over a few months. A higher A1C result means that you have a higher risk of developing complications.  The A1C test  The A1C is a blood test done by your healthcare provider. You will likely have an A1C test every 3 to 6 months.  Your blood glucose goal  A1C has been shown as a percentage. But it can also be shown as a number representing the estimated Average Glucose (eAG). Unlike the A1C percentage, eAG is a number similar to the numbers listed on your daily glucose monitor. Both A1C and eAG measure the amount of glucose stuck to a protein called hemoglobin in red blood cells. Your healthcare provider will help you figure out what your ideal A1C or eAG should be. Your target number will depend on your age, general health, and  other factors. If your current number is too high, your treatment plan may need changes, such as different medicines.  Sample results  Most people aim for an A1c lower than 7%. That s an eAG less than 154 mg/dL. Or, your healthcare provider may want you to aim for an A1C of 6%. That s an eAG of 126 mg/dL.     Glucose calculator  Visit http://professional.diabetes.org/diapro/glucose_calc for a chart that helps convert your A1C percentages into eAG numbers.   Date Last Reviewed: 6/1/2016 2000-2017 [employer]. [address] [place] [id number]. All rights reserved. This information is not intended as a substitute for professional medical care. Always follow your healthcare professional's instructions.                Oral Medicines for Type 2 Diabetes  Diabetes pills can help to manage your blood sugar. These pills are not insulin. They work to manage your blood sugar in several ways. You may be given a combination of medicines. Always follow your healthcare provider's instructions.  Some pills may put you at higher risk for low blood sugar (hypoglycemia). Watch for symptoms of low blood sugar. Symptoms are listed below. Call your healthcare provider if low blood sugar happens often.  Type of diabetes pills  Biguanides  These pills help control the amount of glucose in your blood. They do this by decreasing the amount of glucose made by your liver and helping your muscles use insulin more effectively. These medicines are usually taken with or after each meal. Possible side effects and other problems include:    Diarrhea    Nausea    Vomiting    Belly (abdominal) bloating    Excess gas (flatulence)    Metallic taste in mouth    Lower blood vitamin B12 levels from decreased absorption from the GI tract of this essential vitamin  Have your vitamin B12 levels checked regularly if you have used Biguanides for a long time. This is especially important if you have anemia or peripheral  neuropathy.  Sulfonylureas  These pills help your body make more insulin. They are usually taken 30 minutes before a meal. Possible side effects include hypoglycemia.  Alpha-glucosidase inhibitors  These pills slow the digestion of sugars and starches. They can help keep your blood sugar from going too high after a meal. Take them with the first bite of each main meal. Possible side effects include:    Stomach pain    Diarrhea    Excess gas (flatulence)    Thiazolidinediones  These pills help your muscle cells use insulin better. Your healthcare provider may order lab tests to check the function of your liver before prescribing these pills and regularly while you are taking them. Possible side effects include:     Weight gain    Extra fluid in your body and swelling    Increased risk for heart failure    Osteoporosis and increased risk for broken bones  Meglitinides  These pills increase your insulin for a short period of time. They are usually taken before meals. Possible side effects include:    Low blood sugar    Diarrhea    Headache     Slightly increased risk for heart problems  DPP-4 inhibitors  These pills help lower blood sugar levels in people with type 2 diabetes. They are less likely to cause hypoglycemia, unless you take them with a sulfonylurea. They are taken once a day. Possible side effects include:    Upper respiratory tract infection    Stuffy or runny nose    Sore throat    Headache  Other side effects are under investigation.  SGLT-2 inhibitors  These pills help lower blood sugar levels in people with type 2 diabetes by increasing the amount of sugar that leaks into the urine. Possible side effects include:    Urinary tract infections    Genital infections, especially in women    Dehydration and low blood pressure    Increased bone fractures    Development of keotacidosis while blood sugar is only mildly raised above the target range  Note: The FDA has issued a safety warning for the SGLT-2  inhibitor canagliflozin. Recent studies have shown that this medicine increases the risk of leg and foot amputations. If you are taking this medicine, tell your healthcare provider right away if you have any new pain or tenderness, sores, or infections in your legs or feet. Talk with your healthcare provider before stopping any diabetes medicine.   Dopamine D2 receptor agonist (bromocriptine mesylate)  These pills help lower blood sugar levels in people with type 2 diabetes. Possible side effects of this medicine include:    Nausea    Vomiting    Fatigue    Dizziness    Headaches  Combination pills  These medicines may help keep your blood glucose within your target range. They also help your pancreas make more insulin and may help your muscles use insulin more effectively. Side effects depend on which type of combination you use. Your healthcare provider can tell you more.     Watch for symptoms of hypoglycemia  Symptoms include the following:    Headaches    Shakiness or dizziness    Hunger    Cold, clammy skin; sweating    A hard, fast heartbeat    Confusion or irritability   Date Last Reviewed: 6/1/2016 2000-2017 [employer]. [address] [place] [address]. All rights reserved. This information is not intended as a substitute for professional medical care. Always follow your healthcare professional's instructions.                Hypoglycemia (Low Blood Sugar)     Fast-acting sugar includes a cup of nonfat milk.   Too little sugar (glucose) in your blood is called hypoglycemia or low blood sugar. Low blood sugar usually means anything lower than 70 mg/dL. Talk with your healthcare provider about your target range and what level is too low for you. Diabetes itself doesn t cause low blood sugar. But some of the treatments for diabetes, such as pills or insulin, may raise your risk for it. Low blood sugar may cause you to pass out or have a seizure. So always treat low blood sugar right  away, but don't overeat.  Special note: Always carry a source of fast-acting sugar and a snack in case of hypoglycemia.   What you may notice  If you have low blood sugar, you may have one or more of these symptoms:    Shakiness or dizziness    Cold, clammy skin or sweating    Feelings of hunger    Headache    Nervousness    A hard, fast heartbeat    Weakness    Confusion or irritability    Blurred vision    Having nightmares or waking up confused or sweating    Numbness or tingling in the lips or tongue  What you should do  Here are tips to follow if you have hypoglycemia:     First check your blood sugar. If it is too low (out of your target range), eat or drink 15 to 20 grams of fast-acting sugar. This may be 3 to 4 glucose tablets, 4 ounces (half a cup) of fruit juice or regular (nondiet) soda, 8 ounces (1 cup) of fat-free milk, or 1 tablespoon of honey. Don t take more than this, or your blood sugar may go too high.    Wait 15 minutes. Then recheck your blood sugar if you can.    If your blood sugar is still too low, repeat the steps above and check your blood sugar again. If your blood sugar still has not returned to your target range, contact your healthcare provider or seek emergency care.    Once your blood sugar returns to target range, eat a snack or meal.  Preventing low blood sugar  Things you can do include the following:     If your condition needs a strict treatment plan, eat your meals and snacks at the same times each day. Don t skip meals!    If your treatment plan lets you change when you eat and what you eat, learn how to change the time and dose of your rapid-acting insulin to match this.     Ask your healthcare provider if it is safe for you to drink alcohol. Never drink on an empty stomach.    Take your medicine at the prescribed times.    Always carry a source of fast-acting sugar and a snack when you re away from home.  Other things to do  Additional tips include the following:    Carry a  medical ID card, a compact USB drive, or wear a medical alert bracelet or necklace. It should say that you have diabetes. It should also say what to do if you pass out or have a seizure.    Make sure your family, friends, and coworkers know the signs of low blood sugar. Tell them what to do if your blood sugar falls very low and you can t treat yourself.    Keep a glucagon emergency kit handy. Be sure your family, friends, and coworkers know how and when to use it. Check it regularly and replace the glucagon before it expires.    Talk with your health care team about other things you can do to prevent low blood sugar.     If you have unexplained hypoglycemia or hypoglycemia several times, call your healthcare provider.   Date Last Reviewed: 5/1/2016 2000-2017 [employer]. [address] [place] [address], [place] [address]. All rights reserved. This information is not intended as a substitute for professional medical care. Always follow your healthcare professional's instructions.                Using a Blood Sugar Log    You have diabetes. This means your body has trouble regulating a sugar called glucose. To help manage your diabetes, you ll need to check your blood sugar level as directed by your healthcare provider. Keeping a log of your blood sugar levels will help you track your blood sugar readings. It s a simple and easy way to see how well you are controlling your diabetes.  Checking your blood sugar level  You can check your blood sugar level with a blood glucose meter. You ll first prick the side of your finger with a tiny lancet to draw a tiny drop of blood onto the test strip. Some glucose meters let you use another place on your body to test. But these other places should not be used in some cases as they may be inaccurate. Follow the instructions for your glucose meter. And talk with your healthcare provider before doing the test on other places.  The strip goes into the meter first, then a  drop of blood is placed on the tip of the strip. The meter then shows a reading that tells you the level of your blood sugar. Your readings should be in your target range as often as possible. This means not too high or too low. Staying in this range helps lower your risk for complications. Your healthcare provider will help you figure out the target range that is best for you.  Tracking your readings  Every time you check your blood sugar, use your log to keep track of your readings. Your meter will also probably have a memory feature that your healthcare provider can check at your next visit. You may be advised by your healthcare provider to check your blood sugar in the morning, at bedtime, and before and after meals. Be sure to write down all of your numbers. Also use your log to record things that might have affected your blood sugar. Some examples include being sick, certain medicines, being physically active, feeling stressed, or skipping meals.   Lessons learned from your readings  Tracking your blood sugar readings helps you see patterns. These patterns tell you how your actions affect your blood sugar. For instance, you may have higher numbers after eating certain foods or lower numbers after exercise. They just help you understand how to stay in your target range more often, so that your diabetes remains in good control.  Sharing your log with your healthcare team  Bring your blood sugar log and glucose meter with you to all of your healthcare appointments. This can help your healthcare team make changes to your treatment plan, if needed. This may involve making changes in what you eat, what medicines you take, or how much you exercise.  To learn more  The resources below can help you learn more:    American Diabetes Association [phone] www.diabetes.org    [employer] [phone] www.lighthouse.org    [employer] [facility] [place] [phone] www.nei.nih.gov    [facility]  "Network [phone] www.hormone.org  Date Last Reviewed: 5/1/2016 2000-2017 [employer]. [address] [place] [address], [place] [address]. All rights reserved. This information is not intended as a substitute for professional medical care. Always follow your healthcare professional's instructions.                Exercise to Manage Your Blood Sugar    Being physically active every day can help you manage your blood sugar. That s because an active lifestyle can improve your body s ability to use insulin. Daily activity can also help delay or prevent complications of diabetes. And it s a great way to relieve stress. If you aren t normally active, be sure to consult your healthcare provider before getting started.  How much activity do you need?  If daily activity is new to you, start slow and steady. Begin with 10 minutes of activity each day. Then work up to at least 150 minutes a week of physical activity. Don't let more than 2 days go by without being active. When sitting for long periods of time, get up for short sessions of light activity every 30 minutes  Just move!  You don t have to join a gym or own pricey sports equipment. Just get out and walk. Walking is an aerobic exercise that makes your heart and lungs work hard. It helps your heart and blood vessels. Walking needs only a sturdy pair of sneakers and your own two feet. The more you walk, the easier it gets:    Schedule time every day to move your feet.    Make it part of your daily routine.    Walk with a friend or a group to keep it interesting and fun.    Try taking several short walks during the day to meet your daily activity goal.  A pedometer makes every step count  A pedometer is a small device that keeps track of how many steps you take. You can clip it to your belt (or a strap on your arm or leg) and go about your daily routine. \"Smartphones\" now also have apps to record your walking. At the end of the day, the pedometer shows the " total number of steps you took. Use a pedometer to set daily goals for yourself. For instance, if you walk 4,000 steps a day, try adding 200 more steps each day. Aim for a goal of 7,500. With every step, you re doing a little more to help your body use insulin.   Adding resistance exercise  Resistance exercise (also called strength training), makes muscles stronger. It also helps muscles use insulin better. Ask your healthcare provider whether this type of exercise is right for you. If it is, your healthcare provider can help you work it in to your activity plan.  Staying safe  Being active may cause blood sugar to drop faster than usual. This is especially true if you take medicine to manage your blood sugar. But there are things you can do to help reduce the risk of accidental lows. Keep these tips in mind:    Always carry identification when you exercise outside your home. Carry a cell phone to use in case of emergency.    If you can, include friends and family in your activities.    Wear a medical ID bracelet that says you have diabetes.    Use the right safety equipment for the activity you do (such as a bicycle helmet when you ride a bicycle outdoors). Wear closed-toed shoes that fit your feet well.    Drink plenty of water before and during activity.    Keep a fast-acting sugar (such as glucose tablets) on hand in case of low blood sugar.    Dress properly for the weather. Wear a hat if it s rudolph, or wait until evening if it s too hot.    Avoid being active for long periods in very hot or very cold weather.    Skip activity if you re sick.     Notice how activity affects blood sugar  Physical activity is important when you have diabetes. But you need to keep an eye on your blood sugar level. Check often if you have been active for longer than usual, or if the activity was unplanned. Make it a habit to check your blood sugar before being active. And check again a few hours later. Use your log book to write  down how activity affects your numbers. If you take insulin, you may be able to adjust your dose before a planned activity. This can help prevent lows. You may also need to take a small carbohydrate snack before the exercise. Talk to your healthcare provider to learn more.    Date Last Reviewed: 6/1/2016 2000-2017 [employer]. [address] [place] [address], [place] [address]. All rights reserved. This information is not intended as a substitute for professional medical care. Always follow your healthcare professional's instructions.                Eating Out When You Have Diabetes  Eating right is an important part of keeping your blood sugar in your target range. You just need to make healthy choices.    Tips for restaurant meals  When you eat away from home try these tips:    Try to schedule your dining-out meal at your normal meal time. Make a reservation if possible, so you don't have to wait to eat. If you can't make a reservation, try to arrive at the restaurant at a less-busy time to cut down your wait time. Eat a small fruit or starch snack at your regular mealtime if your restaurant meal is going to be later than usual.     Call ahead to see if the restaurant can meet your dietary needs if you've never been there before. Or you can go online to see the menu ahead of time.    Carry some crackers with you in case the restaurant needs you to wait until you can be served.    Ask how foods are prepared before you order.    Instead of fried, sautéed, or breaded foods, choose ones that are broiled, steamed, grilled, or baked.    Ask for sauces, gravies, and dressings on the side.    Only eat an amount that fits your meal plan. Remember: You can take home the leftovers.    Save dessert for special occasions. Then choose a small dessert or share one with a friend or family member.  Make healthy choices  Fast food    Garden salad with light dressing on the side    Baked potato with vegetables or  herbs    Broiled, roasted, or grilled chicken sandwich    Sliced turkey or lean roast beef sandwich  Mexican    Chicken enchilada, without cheese or sour cream     Small burrito with whole beans and chicken    Whole beans (not refried) and rice    Chicken or fish fajitas  Steakhouse    Grilled or broiled lean cuts of beef    Baked potato with vegetables or herbs    Broiled or baked chicken. Don t eat the skin.    Steamed vegetables  Asian    Steamed dumplings or potstickers    Broiled, boiled, or steamed meats or fish    Sushi or sashimi    Steamed rice or boiled noodles. One serving is equal to 1/3 cup.  Date Last Reviewed: 6/1/2016 2000-2017 [employer]. [address] [place] [address], [place] [address]. All rights reserved. This information is not intended as a substitute for professional medical care. Always follow your healthcare professional's instructions.                Understanding Opioid Medicines for Pain Management  Opioids are medicines that can help reduce pain. They are stronger than most over-the-counter pain relievers and must be prescribed by a healthcare provider. They can be used to treat both acute and chronic pain that ranges from moderate to severe. While opioids can be safe and effective when used correctly, they do come with serious risks and side effects. For this reason, they should only be considered as an option if other medicines or treatments have not done enough to relieve or manage pain.   What is pain?  Pain is your body s way of telling you something is wrong. It makes you pull your hand away from a flame or avoid walking on an injured leg. Pain starts in receptor cells found beneath the skin and in organs throughout the body. When you are sick or injured, these receptor cells send signals along nerve pathways to the spinal cord, which then send the signals to the brain. The brain interprets the signals as pain. In response, it sends back signals to protect the body. The  brain also releases its own natural painkillers called endorphins to help reduce pain. Once the source of the pain heals, the pain usually goes away.   Types of pain  Pain can one be of two types: acute or chronic. Both types respond to treatment.    Acute pain typically lasts fewer than 3 months. It goes away when the cause is treated. Common causes of acute pain include injury or illness. Surgery can lead to short-term pain during healing. And women experience acute pain during and after childbirth. In some cases, acute pain can lead to chronic pain over time.    Chronic pain typically lasts longer than 3 months. This includes pain that comes and goes or that is continuous. Chronic pain may be due to an ongoing health problem, such as arthritis. Or it may linger after an injury that has healed, such as a broken bone. Problems with the body s pain-control system may also lead to chronic pain. Sometimes, chronic pain can occur with no clear cause.   The pain cycle  Pain can affect all aspects of your life--for example, sleep, mood, activity, and energy level are all affected by pain. Being tired, depressed, and inactive make the pain worse and harder to cope with. This leads to a cycle of pain.  How opioids work  Opioids work by attaching to special receptors found in the brain, spinal cord, and other organs. When opioids attach to these receptors, they can block or suppress how you feel pain. Opioids can also make you feel good or relaxed by affecting areas of the brain that produce feelings of pleasure.   Types of opioids  There are two types of opioids: short-acting/immediate-release (SA/IR) and long-acting/extended-release (LA/ER). Short-acting opioids work faster than long-acting opioids but give pain relief for only short periods. Long-acting opioids work slower than short-acting opioids but give pain-relief for longer periods. Many current opioids come in both short- and long-acting formulas. Some examples of  opioids include:    Codeine with acetaminophen    Fentanyl    Hydrocodone (with or without acetaminophen)    Hydromorphone    Meperidine    Methadone    Morphine    Oxycodone (with or without acetaminophen)    Oxycodone with naloxone    Tramadol  If you are prescribed opioids, you will usually be started on a short-acting type at the lowest dosage. The dosage may then be adjusted as needed based on your response to the medicine and further follow-up with your healthcare provider. If appropriate, you may transition to using a long-acting type opioid. In some cases, you may be prescribed both types of opioids to help manage different types of pain. Any changes or adjustments will also depend on your level of pain tolerance and tolerance of side effects.     Studies show that opioids provide short-term benefits for moderate to severe pain. But the benefits of long-term use of opioids for treating pain remain unclear. In general, you should only remain on opioids if they continue to improve pain and function without increasing the risks to your health.   How opioids are given  Most opioids are taken by mouth. They often come in pill form, but some may come in the form of liquids and even sweetened lozenges. Certain opioids also may be injected under the skin, into a muscle, or into a vein. Or they may be absorbed through the skin via a patch.     Know your options  Keep in mind that opioids are not the only option for treating pain. Non-opioid options may work just as well and have fewer risks and side effects. Non-opioid options can include:     Other pain relievers, such as acetaminophen or nonsteroidal anti-inflammatory drugs (NSAIDs) such as ibuprofen or naproxen    Other classes of medicines such as anticonvulsants, antidepressants, and muscle relaxers     Exercise and physical therapy     Cognitive behavioral therapy, which can help you learn different ways to respond and cope with pain     Mind/body therapies  such as deep breathing, distraction, visualization, meditation, or biofeedback     Complementary therapies such as massage, acupuncture and acupressure, or chiropractic care     Various procedures, such as transcutaneous electrical nerve stimulation (TENS), implantation of a spinal pump, and nerve ablation      Don't take opioids in combination with benzodiazepines. Serious risks are associated with combining opioids with benzodiazepines. These risks include extreme sleepiness, slowed breathing, and death. Let your healthcare provider know if you are taking benzodiazepines.   Date Last Reviewed: 8/1/2017 2000-2017 [employer]. [address] [place] [address]. All rights reserved. This information is not intended as a substitute for professional medical care. Always follow your healthcare professional's instructions.                Understanding the Risks and Side Effects of Opioid Medicines  When opioids are taken as prescribed, they are usually safe and can help manage pain effectively. But they do come with risks and side effects that are important to understand. Of the risks that can occur with opioid treatment, opioid overdose is the most serious. Overdose means taking a too high dose. For this reason, it is critical that you and your loved ones understand the signs and symptoms of an opioid overdose and what to do if it occurs.   Risks of opioid medicines  If you take opioids regularly for a long time, there is a risk of forming a tolerance or dependence to the medicines. There is also the risk of forming an addiction. But this is much less common when opioids are taken as directed under the care of a healthcare provider. Understanding the differences between tolerance, dependence, and addiction is important. This helps you know what to expect when taking opioids and know what to do if you think you may be addicted.     Tolerance means that your body needs higher doses than before to  get the same pain relief effects. Most people who take opioids for longer than a few weeks will form a tolerance. This is normal. Your healthcare provider will work with you to manage tolerance and ensure that your pain is still controlled.    Dependence means your body will have withdrawal symptoms if you reduce or stop taking the medicine. These symptoms can include sleeplessness, rapid heartbeat, rapid breathing, and diarrhea. Forming a dependence is common for people taking opioids regularly for a long time. When it is time to stop taking the medicine, your healthcare provider will work closely with you to taper the medicine to lessen withdrawal symptoms. You should never stop taking or reduce the amount of medicine you are used to taking without talking to your healthcare provider. Note: Dependence is not the same thing as addiction.    Addiction occurs when a person has the urge to seek out the medicine and can't stop using it despite the harm and negative effects it might cause. Some people, such as those who have a history of drug misuse, are at higher risk for addiction. Your healthcare provider will follow up with you regularly and also monitor you for signs of addiction. If you think you are forming an addiction to your medicine, call your healthcare provider right away.     What is opioid-use disorder?  Opioid-use disorder is a risk of taking opioid medicines. It may be diagnosed if a person shows a pattern of taking opioids despite negative consequences such as:    The opioid interferes with life, family or work obligations (this includes avoiding situations because of opioid use)    The opioid causes physical or psychological problems    Continued and increased amount of time spent attempting to obtain, use and recover from opioid use    Unsuccessful attempts to cut down or stop opioid use    Using a higher amount of opioid than prescribed or using it in unsafe situations (such as driving)    Unmanaged  signs and symptoms of tolerance or withdrawal  If you or your family suspect opioid-use disorder, contact your healthcare provider right away. They can help you assess the problem and provide treatment if needed.    Risk for overdose  Opioids affect the part of the brain that controls breathing. An overdose of opioids can slow breathing down too much and even stop a person s breathing. This can be fatal. Call 911 right away if an overdose is suspected in any person.   Three key signs and symptoms of opioid overdose are:    Narrowing of dark circles in the middle of eyes (pinpoint pupils)    Slowed or stopped breathing    Unconsciousness (this is when a person passes out and does not respond)  Other signs and symptoms to look for include:    Limp body    Pale face    Clammy skin    Purple or blue color of the lips and fingernails    Vomiting   Your healthcare provider may prescribe a medicine called naloxone in case of opioid overdose. When given within a certain period of time after an overdose, naloxone can help reverse the life-threatening effects of the opioid. Emergency care will still be needed.  Side effects of opioid medicines  Some side effects are common when taking opioids. These include constipation, nausea, sleepiness, impaired motor skills, and problems emptying the bladder (urinary retention). Opioid medicines can also cause problems with memory, thinking, and judgment, especially in older adults.   If you have any of these side effects, talk with your healthcare provider or pharmacist. They can provide advice for managing them. This might include:    Reducing the dose of your opioid medicine (never do this without talking with your healthcare provider)    Trying a different type or brand of opioid medicine    Adding a drug to treat the side effect   In some cases, your healthcare provider may take measures to help prevent side effects that are likely to occur. For instance, to help prevent  constipation, your healthcare provider may prescribe a laxative or stool softener at the same time you start opioid treatment.   More serious or longer-lasting side effects can occur when you don t take opioids exactly as directed. Misusing opioids can lead to liver and brain damage. To avoid these side effects:    Never take more opioids than prescribed by your healthcare provider.    Never combine opioids with non-prescribed medicines.    Never use street drugs or drink alcohol while taking opioids.    Don't take opioids in combination with benzodiazepines. Serious risks are associated with combining opioids with benzodiazepines. These risks include extreme sleepiness, slowed breathing, and death. Let your healthcare provider know if you are taking benzodiazepines.  When to call your healthcare provider  You will be carefully monitored during treatment with opioid medicines. But you should call your healthcare provider right away if you have any of these symptoms:    New pain, pain that gets worse, or pain that doesn t get better even after you take your medicine    Side effects, such as constipation or nausea, that keep you from daily activities    Extreme sleepiness    Breathing problems  Date Last Reviewed: 8/1/2017 2000-2017 [employer]. [address] [place] [address]. All rights reserved. This information is not intended as a substitute for professional medical care. Always follow your healthcare professional's instructions.

## 2018-07-25 NOTE — PROGRESS NOTES
Physician Attestation   I, Sudeep Willis, saw and evaluated Samira Peralta as part of a shared visit.  I have reviewed and discussed with the advanced practice provider their history, physical and plan.    I personally reviewed the vital signs, medications, labs and imaging.    My key history or physical exam findings: 64 yo F presenting with 1 week of intermittent chest pressure, worse for past 2 days. Reports 2-3 weeks of sinus congestion, rhinorrhea, chest congestion although cough ultimately non-productive. On exam appears comfortable. Heart irregular. Lungs clear, no wheezes.    Key management decisions made by me:   Stress cardiomyopathy  - Cardiology consulted on admission, appreciate assistance. LHC with minimal disease, pattern on LV gram consistent with stress cardiomyopathy  - Continue metoprolol    Atrial fibrillation   History of DVT/PE  - Resume warfarin with angiogram completed    Minimal opacity upper lungs  Afebrile, no leukocytosis. Has had 2-3 weeks of URI-type symptoms.   - Procalcitonin ordered, 0.11 -> low risk for bacterial infection. Hold on antibiotics for now, more likely viral process  - Monitor fever curve, symptoms    Sudeep Willis  Date of Service (when I saw the patient): 07/25/18

## 2018-07-25 NOTE — H&P
Admitted:     07/25/2018      CHIEF COMPLAINT:  Fall.      HISTORY OF PRESENT ILLNESS:  Samira Peralta is a 63-year-old female with a history of CVA, status post thrombectomy, atrial fibrillation, history of DVT and PE on Coumadin, chronic pain, complex regional pain syndrome, FERNANDO noncompliant with CPAP, hypertension, diagnosed schizophrenia and brachial plexitis who presented to the Emergency Department for evaluation after a fall.  The patient reports that she was trying to get out of bed last evening and misjudged her distance from the edge.  She slid out of bed onto her backside hitting her back on the edge of the bed.  She denies hitting her head or losing consciousness.  She was unable to get up due to back pain and remained on the floor overnight.  This morning she was able to get to her cell phone and call for help.  Additionally, the patient notes that for the past few days she has had intermittent chest discomfort that she describes as a pressure with associated feeling of chest congestion, wheezing, and dry cough.  She has also felt short of breath and diaphoretic on and off, particularly while she laid on the floor overnight.  She notes generally that her chest pressure is worse when lying down and improves when sitting forward.  She denies any recent fevers or chills. She notes she has been under considerable stress due to her recent move.     The patient was evaluated in the Emergency Department by Dr. Elder.  EKG obtained showed T-wave inversions in inferior and anterolateral leads.  Troponin was found to be elevated at 1.6.  A CT of the chest, abdomen and pelvis was obtained in order to rule out PE in the setting of subtherapeutic INR.  This was negative for evidence of pulmonary embolism, did show tiny bilateral pleural effusions and minimal ground-glass opacities in the upper lungs, likely infection versus inflammation.  The patient was started on IV heparin as well as a nitroglycerin drip.   She is presently evaluated in the emergency room by me.  She reports that she continues to have 4/10 chest pressure as well as significant pain in her lower back.  She has chronic low back pain which has been exacerbated after her fall last evening.  She reports some radiation to her right leg and hips, which is typical for her back pain.  She has no saddle anesthesia, or loss of bladder or bowel control.  She continues to feel diaphoretic and mildly short of breath.      REVIEW OF SYSTEMS:  A 10-point review of systems was conducted and is negative aside from information in the HPI.      PAST MEDICAL HISTORY:   1.  History of CVA, status post thrombectomy.  This occurred in 2016 and was thought to be secondary to atrial fibrillation.   2.  History of DVT and PE.  She reports PE in 2012 and 3 DVTs throughout her life in her right leg.   3.  Paroxysmal atrial fibrillation on warfarin.   4.  Chronic pain.   5.  Complex regional pain syndrome.  Plan is to have a nerve stimulator implanted next month.   6.  FERNANDO noncompliant with CPAP.   7.  Hypertension.   8.  Schizophrenia.  This information is obtained through Care Everywhere.  I do see that patient had a recent preop for nerve stimulator which she did not pass due to delusions.  Shaunadal was advised by Psychiatry and she declined this.   9.  Brachial plexitis status post surgical intervention.      ALLERGIES:   1.  ACE INHIBITOR.   2.  MORPHINE SULFATE.   3.  POLLEN.        PRIOR TO ADMISSION MEDICATIONS:    Prior to Admission medications    Medication Sig Last Dose Taking? Auth Provider   Acetaminophen (TYLENOL PO) Take 500 mg by mouth 2 times daily as needed for mild pain or fever    Yes Reported, Patient   Albuterol Sulfate (VENTOLIN IN) Inhale 1-2 puffs into the lungs every 6 hours as needed    Yes Reported, Patient   ALPRAZolam (XANAX) 0.5 MG tablet Take 0.5 mg by mouth daily as needed    Yes Reported, Patient   baclofen (LIORESAL) 20 MG tablet Take 20 mg by  mouth 3 times daily. 7/24/2018 at Unknown time Yes Reported, Patient   cetirizine (ZYRTEC) 10 MG tablet Take 10 mg by mouth daily 7/24/2018 at Unknown time Yes Reported, Patient   diclofenac (VOLTAREN) 1 % GEL Apply topically daily as needed for moderate pain (Apply to back)    Yes Reported, Patient   DULoxetine (CYMBALTA) 30 MG capsule Take 60 mg by mouth 2 times daily  7/24/2018 at Unknown time Yes Reported, Patient   Furosemide (LASIX PO) Take 20 mg by mouth daily 7/24/2018 at Unknown time Yes Reported, Patient   HYDROcodone-acetaminophen (NORCO) 7.5-325 MG per tablet Take 1 tablet by mouth 3 times daily 7/25/2018 at in ED Yes Reported, Patient   hydrocortisone 1 % CREA cream Apply topically 2 times daily as needed for other (to face)   Yes Unknown, Entered By History   METOPROLOL TARTRATE PO Take 50 mg by mouth 2 times daily 7/24/2018 at Unknown time Yes Reported, Patient   multivitamin, therapeutic (THERA-VIT) TABS tablet Take 1 tablet by mouth daily Past Week at Unknown time Yes Unknown, Entered By History   omeprazole 20 MG tablet Take 20 mg by mouth 2 times daily. 7/24/2018 at Unknown time Yes Reported, Patient   polyethylene glycol (MIRALAX/GLYCOLAX) Packet Take 1 packet by mouth daily as needed    Yes Reported, Patient   potassium chloride SA (K-DUR/KLOR-CON M) 10 MEQ CR tablet Take 20 mEq by mouth daily Past Week at Unknown time Yes Reported, Patient   pregabalin (LYRICA) 100 MG capsule Take 150 mg by mouth 3 times daily  7/24/2018 at Unknown time Yes Reported, Patient   SIMETHICONE-80 PO Take 80 mg by mouth 2 times daily 7/24/2018 at Unknown time Yes Unknown, Entered By History   tiZANidine (ZANAFLEX) 4 MG tablet Take 4 mg by mouth 2 times daily as needed    Yes Reported, Patient   WARFARIN SODIUM PO Take by mouth daily M and Fri 2.5mg, all other days 5mg 7/23/2018 Yes Reported, Patient          PAST SURGICAL HISTORY:    Past Surgical History:   Procedure Laterality Date     BREAST SURGERY  1994     Breast reduction     Hysterectomy[  2002     ORTHOPEDIC SURGERY  1999    Left Knee tear         SOCIAL HISTORY:  The patient denies any alcohol use or drug use.  She has no smoking history.  She lives in an apartment, but has been evicted and is currently in the process of moving.  She has been looking into assisted living options but has had difficulty with this.      FAMILY HISTORY:  Her father had a history of CAD and atrial fibrillation.      LABORATORY DATA:  CMP is within normal limits with creatinine of 0.67, aside from a mildly elevated AST of 64.  CK is elevated at 249.  Procalcitonin 0.11.  Troponin is elevated at 1.6.  White blood cell count is normal 8.0, hemoglobin 13.5, hematocrit 39.1 and platelets are 221.  Blood sugar on admission was 231.  A1c is pending.  INR subtherapeutic at 1.65.  UA is largely unremarkable aside from multiple squamous cells.  CT of the chest, abdomen and pelvis shows tiny bilateral pleural effusions with minimal areas of ground-glass opacity in the upper lungs, likely due to infection versus inflammation without any evidence of pulmonary embolism, no acute findings in the abdomen or pelvis.  Enlarged fatty liver is seen as well as probable small gallstones.      PHYSICAL EXAMINATION:   VITAL SIGNS:  Temperature 98.3, heart rate 75, blood pressure 133/78, respiratory rate 19, oxygen saturation is 97% on room air.   GENERAL:  Alert and oriented female lying down in bed, appears uncomfortable secondary to pain.  She is appropriately conversant.   HEENT:  Pupils equal, reactive to light.  EOMI.  Mucous membranes are moist.  Surgical scar present over the posterior C-spine.   CARDIOVASCULAR:  Regular rate and rhythm, no murmurs appreciated.  No rub appreciated.   RESPIRATORY:  Lungs are clear to auscultation bilaterally, no increased work of breathing or wheezing.  No crackles.   GASTROINTESTINAL:  Positive bowel sounds.  Abdomen is soft, nontender to palpation.   SKIN:  Warm and  dry.   EXTREMITIES:  She has chronic stasis changes to bilateral lower extremities, warm and well perfused.  She has mild bilateral lower extremity edema.   NEUROLOGIC:  Cranial nerves II-XII are grossly intact.  No focal deficits.  Baseline neuropathy.  Otherwise, sensation is intact.  She has a baseline right upper extremity tremor related to her brachial plexitis per her report, which is unchanged.  Speech is clear.      ASSESSMENT AND PLAN:  Samira Peralta is a 63-year-old female with a past medical history significant for CVA, PE and DVT on chronic Coumadin, paroxysmal atrial fibrillation, chronic pain, complex regional pain syndrome, obstructive sleep apnea noncompliant with CPAP, hypertension, schizophrenia, brachial plexitis and cardiomyopathy who presented to the Emergency Department today for evaluation after a fall.  She is currently being admitted to the CICU for further evaluation of NSTEMI.       Non-ST elevation myocardial infarction.  The patient presents after a mechanical fall at home and notes intermittent chest pressure, shortness of breath, diaphoresis over the past few days.  Her troponin on admission is elevated at 1.6.  EKG shows new T-wave inversions in inferior and anterolateral leads.  She has been started on heparin and nitroglycerin drip.  Vitals are currently stable.  The patient does continue to have a mild chest discomfort.  While she does not have a known history of CAD, I do see that during a hospitalization in 2016 she had elevated troponin with echocardiogram showing reduced ejection fraction of 47%.  Further ischemic workup was not pursued during this hospitalization and does not appear this has been completed as an outpatient.  Cardiology consulted, appreciate the assistance.  The patient to be n.p.o. until seen by Cardiology.  TTE has been ordered, will evaluate for pericardial pathology as patient does report some improvement in her chest pain when she is leaning forward.   We will continue her prior to admission metoprolol and add daily aspirin and Lipitor.  Defer ACE or ARB to Cardiology.  Continue IV heparin and IV nitroglycerin.  Continue to trend troponins.  Telemetry.       Paroxysmal atrial fibrillation.  She is in sinus rhythm on admission.  She is on warfarin prior to admission with subtherapeutic INR of 1.6.  Continue prior to admission metoprolol.  Telemetry.  Hold warfarin while on heparin.      History of DVT and PE.  The patient reports last event was in 2012.  She has no PE on chest CT despite her subtherapeutic INR of 1.6.  She reports she is compliant with her warfarin.  On IV heparin, will resume warfarin pending Cardiology workup.        Acute on chronic back pain, status post mechanical fall.     Complex regional pain syndrome.  The patient reports a chronic low back pain with some bilateral radicular symptoms, right greater than left.  She reports that she slid out of bed last evening due to incorrectly estimating her distance from the edge of the bed.  She did not hit her head but did strike her back when falling.  Prior to admission, she is on Norco 7.5/325 scheduled 3 times daily with the addition of baclofen and tizanidine.  She is also scheduled to have a spinal stimulator placed next month.  The biggest complaint right now is having significant muscle spasms in her right lower back.  She has no red flag symptoms including saddle anesthesia, incontinence.    --Will continue prior to admission Norco but change dose to 5-10 mg q.6h p.r.n.  She has a reported allergy to morphine.    --Will order both baclofen and tizanidine.  The patient reports that she alternates these at home and does not take both at one time.  I have ordered one or the other to be given for muscle spasm.   --Continue prior to admission Lyrica.   -- Ice and heat.    --Will order physical therapy consult for tomorrow and social work for discharge planning.  The patient has been looking into  assisted living facilities and had some trouble with this.      Bilateral upper lobe opacities on chest CT. Read as infection vs inflammation. Patient is afebrile with normal WBC. She does note recent cough and feeling of having phlegm in her chest. Procal is 0.11- low risk systemic infection. Respiratory symptoms may be more related to fluid as small bilateral effusions also seen on imaging.   --no abx for now  --monitor for fever     Hypertension.    Continue prior to admission metoprolol with hold parameters.       History of cardiomyopathy.  Per Care Everywhere, the patient was admitted in the hospital in 2016 with presumed sepsis as she had a non-ST elevation MI at that time and was treated with heparin.  EF of 47% with hypokinesis of inferior septum, inferior and inferolateral walls.  At that time, Cardiology recommended medical management and deferred further assessment.  She reports she has not had followup since this time.  She does take Lasix orally prior to admission for chronic lower extremity edema.  Her CT on admission does show some small bilateral pleural effusions and she feels like she has a little extra fluid on board.  Will continue Lasix pending Cardiology evaluation.  Will monitor strict intake and output and daily weights and repeat transthoracic echocardiogram has been ordered for today.      Gastroesophageal reflux.  Continue prior to admission PPI.      Schizophrenia.  This diagnosis is listed in chart review and patient recently had issues with a preoperative evaluation due to delusions and Risperdal was recommended; however, she has refused this due to fear of side effects.  She does not appear to have any active hallucinations at this time.  Will monitor clinically.      CODE STATUS:  Full code.  This was discussed on admission.      The patient was seen and examined with Dr. Sudeep Willis who agrees with the above plan.         SUDEEP WILLIS MD       As dictated by RADHA SULLIVAN,  KEIRY            D: 2018   T: 2018   MT: HEYDI      Name:     KARLA CHÁVEZ   MRN:      [id number]        Account:      [id number]   :      1955        Admitted:     2018                   Document: [id number]

## 2018-07-25 NOTE — ED PROVIDER NOTES
"  History     Chief Complaint:  Fall    HPI   Samira Peralta is a 63 year old female with a history of stroke, pulmonary embolism, deep vein thrombosis, and Atrial Fibrillation, on Coumadin, chronic pain syndrome and low back pain, as well as complex regional pain syndrome on Norco three times a day, who presents to the emergency department for evaluation of a fall. She has not had her medications last night or this morning. The patient currently lives alone in an apartment, but is moving to senior living this next week--she will still live alone, but it is an older community. Last night, while at home, she slid out of bed at 2230, and could not get up alone--she needs help getting up normally and does have a rail on her bed, but could not reach it. The patient reports that she hit her low back, aggravating her already present low back pain. Because of her back pain, neuropathy, knee pain, and right sided weakness secondary to an old injury, she could not get up last night. She states that she did not hit her head. The patient did cry out, but notes that her neighbors did not call 911 when they heard her. She was eventually able to get her own phone to call. Now, in the emergency department, she endorses worsening back pain, radiating into her sides and abdomen, chest tightness, diaphoresis, as well as leg swelling. Of note, Samira has had the abdominal pain like this for the past few days, and describes some \"strange noises\" coming from the area, with some new congestion as well.  She has had imaging of this in March, and states that it was unremarkable (see below).  Her pain in her abdomen has been \"staying steady\"; it had dissipated after March, but worsened again over the past few days.     [facility]   CT Abdomen Pelvis w Contrast 03/12/2018  IMPRESSION:  1. Etiology of abdominal pain not evident. No bowel abnormality except for apparent postop changes of ileocecal " resection.   2. Fatty liver.  3. Cholelithiasis.  4. Post hysterectomy.  Report per radiology     Allergies:  Ace Inhibitors  Morphine Sulfate  Pollen Extract      Medications:    Xanax  Baclofen  Celebrex  Cymbalta  Voltaren  Lisinopril  Vicodin   Coumadin  Dolophine  Lyrica  Omeprazole   Tazanidine   Modafinil   Risperdal   Lasix  Metoprolol  Lovenox   Norco    Past Medical History:    Anxiety and depression  Brachial plexitis  Face pain  Headache  Hearing loss  Perioral dermatitis  Rosacea   Heart burn   Hypertension  Itchy eyes  Seasonal allergies  Sinus pain  Sleep apnea  Snoring  Stuffy nose  Weakness  Wound healing, delayed   Pulmonary embolism  Rhabdomyolysis   NSTEMI  Cecal volvulus  Morbid obesity  Chronic pain  Leukopenia  Opioid type dependence, continuous use   Atrial Fibrillation   Complex regional pain syndrome   Deep vein thrombosis   Generalized abdominal pain  Anemia   Acute right MCA stroke    Past Surgical History:    Breast reduction  Hysterectomy  Left knee tear     Family History:    Atrial Fibrillation - Father   Coronary artery disease  - Father     Social History:  The patient was accompanied to the ED by EMS.  Smoking Status: Never  Smokeless Tobacco: Never  Alcohol Use: Yes   Marital Status:        Review of Systems   Constitutional: Positive for diaphoresis.   Respiratory: Positive for chest tightness.    Cardiovascular: Positive for leg swelling.   Gastrointestinal: Positive for abdominal pain.   Musculoskeletal: Positive for back pain, gait problem and myalgias.   Neurological: Positive for weakness.   All other systems reviewed and are negative.    Physical Exam   Patient Vitals for the past 24 hrs:   BP Temp Temp src Pulse Heart Rate Resp SpO2 Height Weight   07/25/18 1630 109/74 - - - 111 18 98 % - -   07/25/18 1615 109/88 - - - 126 14 93 % - -   07/25/18 1604 122/76 - - - 146 17 98 % - -   07/25/18 1600 - 98.6  F (37  C) Oral - - - - - -   07/25/18 1445 136/89 - - - 65 16  "98 % - -   07/25/18 1430 (!) 142/92 - - - 82 17 98 % - -   07/25/18 1415 119/77 - - - 79 24 98 % - -   07/25/18 1400 133/78 - - - 75 19 97 % - -   07/25/18 1345 141/77 - - - 74 18 98 % - -   07/25/18 1329 125/74 - - - 80 14 98 % - -   07/25/18 1253 127/72 - - 76 - 20 97 % - -   07/25/18 1229 139/72 - - 73 - - 98 % - -   07/25/18 1158 137/71 - - 77 - - 96 % - -   07/25/18 1131 137/83 - - 76 - - 95 % - -   07/25/18 1109 148/77 - - 80 - 20 98 % - -   07/25/18 0920 (!) 144/116 98.3  F (36.8  C) Oral 109 - 24 94 % 1.6 m (5' 3\") 111.1 kg (245 lb)      Physical Exam  General: Resting comfortably on the gurney  Eyes:  The pupils are equal and round    Conjunctivae and sclerae are normal  ENT:    Moist mucous membranes    No head trauma  Neck:  Normal range of motion  CV:  Tachycardic rate and rhythm    Skin warm and well perfused   Resp:  Lungs are clear    Non-labored    No rales    No wheezing   GI:  Abdomen is soft, there is no rigidity    No distension    No rebound tenderness     Mild left sided abdominal tenderness  MS:  Normal muscular tone  Skin:  No rash or acute skin lesions noted  Neuro:   Awake, alert.      Speech is normal and fluent.    Face is symmetric.     Moves all extremities equally  Psych: Normal affect.  Appropriate interactions.    Emergency Department Course     ECG:  Indication: Fall  Completed at 1006.  Read at 1012.   Normal sinus rhythm   T wave abnormality, consider inferior ischemia   T wave abnormality, consider anterolateral ischemia   Prolonged QT  Abnormal ECG   Rate 79 bpm. TX interval 136. QRS duration 86. QT/QTc 430/498. P-R-T axes 33 36 213.    ECG:  Indication: Chest pain; Fall  Completed at 1105.  Read at 1137.    Normal sinus rhythm   ST & T wave abnormality, consider inferior ischemia  ST & T wave abnormality, consider anterolateral ischemia  Prolonged QT  Abnormal ECG  Rate 84 bpm. TX interval 148. QRS duration 84. QT/QTc 426/503. P-R-T axes 34 35 211.    Imaging:  Radiology " findings were communicated with the patient who voiced understanding of the findings.    CT Chest/Abdomen/Pelvis w Contrast:  IMPRESSION:  1. No visualized pulmonary embolism.  2. Tiny pleural effusions. Minimal areas of groundglass opacity in the  upper lungs likely due to infection/inflammation.  3. No acute findings in the abdomen or pelvis.  4. Enlarged liver with fatty infiltration.  5. Probable small gallstone or stones.  Report per radiology     Laboratory:  Laboratory findings were communicated with the patient who voiced understanding of the findings.    CBC: AWNL. (WBC 8.0, HGB 13.5, )   CMP: Glucose 231 (H) o/w WNL (Creatinine 0.67)    INR: 1.65 (H)   CK Total: 249 (H)      Troponin (Collected 0936): 1.610 (HH)   Procalcitonin: 0.11     UA: Yellow, cloudy urine, Glucose urine 150, Ketones urine 10, Protein Albumin Urine 30, Bacteria urine few, Squamos Epithelial/HPF Urine 87 (H), Mucous Urine present o/w WNL   Urine Culture Aerobic Bacterial: Pending    Interventions:  1049 - Norco 5-325mg 1 Tablet PO   1108 - Aspirin 325mg PO  1111 - Nitrostat 0.4mg Sublingual     Emergency Department Course:  Nursing notes and vitals reviewed.  IV was inserted and blood was drawn for laboratory testing, results above.  The patient provided a urine sample here in the emergency department. This was sent for laboratory testing, findings above.  The patient was sent for a CT Chest/Abdomen/Pelvis w Contrast while in the emergency department, results above.   EKG obtained in the ED, see results above.     0950: I performed an exam of the patient as documented above.     1058: Patient rechecked and updated.     1117: Patient rechecked and updated.     1130: I spoke with Dr. Willis of the Hospitalist service regarding patient's presentation, findings, and plan of care.    Findings and plan explained to the Patient who consents to admission. Discussed the patient with Dr. Willis, who will admit the patient to a [facility]  bed for further monitoring, evaluation, and treatment.  I personally reviewed the laboratory and imaging results with the Patient and answered all related questions prior to admission.    Impression & Plan      Medical Decision Making:  Samira Peralta is a 63 year old female who presented to the ED after fall. Vital signs noted for mild tachycardia. No trauma on exam. Denies hitting her head or LOC. EKG with prominent t wave inversions in lateral leads which are new. Did report some chest tightness so obtained troponin which was elevated. On repeat evaluation complaining of still some chest tightness, given nitroglycerin and repeat EKG obtained with no STEMI. IMaging obtained with no injury found from her sliding to ground. No PE found. INR subtherapeutic. Will treat as NSTEMI given new t wave inversions, elevated troponin. Started on heparin drip and nitroglycerin drip. Possible opacity in upper lobes on imaging, but patient with no fever or leukocytosis with only minimal cough. Seems less likely pneumonia. Discussed with hospitalist for admission.    Critical care time: 30 minutes    Diagnosis:    ICD-10-CM    1. NSTEMI (non-ST elevated myocardial infarction) (H) I21.4        Disposition:  Admitted to CICU     Scribe Disclosure:  ICortney, am serving as a scribe at 9:29 AM on 7/25/2018 to document services personally performed by Melina Elder MD based on my observations and the provider's statements to me.   7/25/2018    EMERGENCY DEPARTMENT       Melina Elder MD  07/25/18 1943

## 2018-07-25 NOTE — PLAN OF CARE
Problem: Patient Care Overview  Goal: Plan of Care/Patient Progress Review  Outcome: No Change  Pt admitted to floor from ED for chest pain and was sent for angio this afternoon. Angio showed stress cardiomyopathy. Right groin CDI with no bleeding, hematoma, or bruit noted. VSS. Tele showed SR prior to angio. Pt went into A-fib with RVR post angio with HR in the 140's. She converted back to SR with frequent PAC's on her own. MD's aware. Pt continues to have c/o 1/10 chest pressure. She denies SOB. New diagnosis of diabetes with a HgbA1C 7.9. Sliding scale insulin added. Coumadin restarted. Plan is for cardiac rehab tomorrow and discharge home when stable. Bed alarm on. Will continue to monitor pt.

## 2018-07-25 NOTE — ED NOTES
Bed: ED11  Expected date:   Expected time:   Means of arrival:   Comments:  [facility] [place] 63F fall eta 0910

## 2018-07-25 NOTE — PHARMACY-ADMISSION MEDICATION HISTORY
Admission medication history interview status for the 7/25/2018  admission is complete. See EPIC admission navigator for prior to admission medications     Medication history source reliability:Good    Actions taken by pharmacist (provider contacted, etc):None     Additional medication history information not noted on PTA med list : Patient was prescribed risperidone in July but took it for a week and will not take it due to side effect.     Medication reconciliation/reorder completed by provider prior to medication history? No    Time spent in this activity: 20 min    Prior to Admission medications    Medication Sig Last Dose Taking? Auth Provider   Acetaminophen (TYLENOL PO) Take 500 mg by mouth 2 times daily as needed for mild pain or fever   Yes Reported, Patient   Albuterol Sulfate (VENTOLIN IN) Inhale 1-2 puffs into the lungs every 6 hours as needed   Yes Reported, Patient   ALPRAZolam (XANAX) 0.5 MG tablet Take 0.5 mg by mouth daily as needed   Yes Reported, Patient   baclofen (LIORESAL) 20 MG tablet Take 20 mg by mouth 3 times daily. 7/24/2018 at Unknown time Yes Reported, Patient   cetirizine (ZYRTEC) 10 MG tablet Take 10 mg by mouth daily 7/24/2018 at Unknown time Yes Reported, Patient   diclofenac (VOLTAREN) 1 % GEL Apply topically daily as needed for moderate pain (Apply to back)   Yes Reported, Patient   DULoxetine (CYMBALTA) 30 MG capsule Take 60 mg by mouth 2 times daily  7/24/2018 at Unknown time Yes Reported, Patient   Furosemide (LASIX PO) Take 20 mg by mouth daily 7/24/2018 at Unknown time Yes Reported, Patient   HYDROcodone-acetaminophen (NORCO) 7.5-325 MG per tablet Take 1 tablet by mouth 3 times daily 7/25/2018 at in ED Yes Reported, Patient   hydrocortisone 1 % CREA cream Apply topically 2 times daily as needed for other (to face)  Yes Unknown, Entered By History   METOPROLOL TARTRATE PO Take 50 mg by mouth 2 times daily 7/24/2018 at Unknown time Yes Reported, Patient   multivitamin,  therapeutic (THERA-VIT) TABS tablet Take 1 tablet by mouth daily Past Week at Unknown time Yes Unknown, Entered By History   omeprazole 20 MG tablet Take 20 mg by mouth 2 times daily. 7/24/2018 at Unknown time Yes Reported, Patient   polyethylene glycol (MIRALAX/GLYCOLAX) Packet Take 1 packet by mouth daily as needed   Yes Reported, Patient   potassium chloride SA (K-DUR/KLOR-CON M) 10 MEQ CR tablet Take 20 mEq by mouth daily Past Week at Unknown time Yes Reported, Patient   pregabalin (LYRICA) 100 MG capsule Take 150 mg by mouth 3 times daily  7/24/2018 at Unknown time Yes Reported, Patient   SIMETHICONE-80 PO Take 80 mg by mouth 2 times daily 7/24/2018 at Unknown time Yes Unknown, Entered By History   tiZANidine (ZANAFLEX) 4 MG tablet Take 4 mg by mouth 2 times daily as needed   Yes Reported, Patient   WARFARIN SODIUM PO Take by mouth daily M and Fri 2.5mg, all other days 5mg 7/23/2018 Yes Reported, Patient

## 2018-07-25 NOTE — ED NOTES
"[facility]  ED Nurse Handoff Report    ED Chief complaint: Fall (slid out of bed last night at 1030pm, couldn't get up. on coumadin. did not hit head.)      ED Diagnosis:   Final diagnoses:   NSTEMI (non-ST elevated myocardial infarction) (H)       Code Status: see epic    Allergies:   Allergies   Allergen Reactions     Ace Inhibitors Other (See Comments)     Morphine Sulfate Nausea and Vomiting, Itching and Difficulty breathing     Pollen Extract      Other reaction(s): Runny Nose  dust       Activity level - Baseline/Home:  Independent c cane at home    Activity Level - Current:   Unable to Assess, have not gotten up, but probable heavy assist of 2     Needed?: No    Isolation: No  Infection: Not Applicable  Bariatric?: No    Vital Signs:   Vitals:    07/25/18 0920 07/25/18 1109 07/25/18 1131   BP: (!) 144/116 148/77 137/83   Pulse: 109 80 76   Resp: 24 20    Temp: 98.3  F (36.8  C)     TempSrc: Oral     SpO2: 94% 98% 95%   Weight: 111.1 kg (245 lb)     Height: 1.6 m (5' 3\")         Cardiac Rhythm: ,        Pain level: 0-10 Pain Scale: 7    Is this patient confused?: No   [place] - Suicide Severity Rating Scale Completed?  Yes  If yes, what color did the patient score?  White    Patient Report: Initial Complaint: pt slid out of her bed last night around 1030pm and was unable to get up. Sat on the floor all night till she remembered she had her cell phone and called ems. No warfarin, did not hit her head. No apparent injury from fall. Does c/o multiple complaints, hard to differentiate what are her usual aliments vs new ones. C/o epigastric pain  For a couple of days. And wheezing and panic attack yesterday afternoon.   Focused Assessment: aox4. Moans regularly in discomfort. Has back and over all body pain and discomforts at baseline. C/o epigastric pain and SOB, though pt does not appear SOB. VSS. No injury from fall. Lives at home independently  Tests Performed: labs, CT , UA, " EKG  Abnormal Results: trop = 1.610, CK slight elevated, INR low at 1.65  Treatments provided: nitroX2 SL, 324mg aspirin, will start heparin/nitro drip    Family Comments: none present    OBS brochure/video discussed/provided to patient: N/A    ED Medications:   Medications   nitroGLYcerin (NITROSTAT) sublingual tablet 0.4 mg (0.4 mg Sublingual Given 7/25/18 1111)   nitroGLYcerin 50 mg in D5W 250 mL (adult std) infusion (not administered)   HYDROcodone-acetaminophen (NORCO) 7.5-325 MG per tablet 1 tablet (1 tablet Oral Given 7/25/18 1049)   Saline Flush (77 mLs Intravenous Given 7/25/18 1029)   iopamidol (ISOVUE-370) solution 123 mL (123 mLs Intravenous Given 7/25/18 1029)   aspirin tablet 325 mg (325 mg Oral Given 7/25/18 1108)       Drips infusing?:  Heparin/nitro    For the majority of the shift this patient was Green.   Interventions performed were none.    Severe Sepsis OR Septic Shock Diagnosis Present: No      ED NURSE PHONE NUMBER: [phone]

## 2018-07-25 NOTE — IP AVS SNAPSHOT
[place] [address] [facility]    [address] [place] [address]    Phone:  [phone]                                       After Visit Summary   7/25/2018    Samira Peralta    MRN: [id number]           After Visit Summary Signature Page     I have received my discharge instructions, and my questions have been answered. I have discussed any challenges I see with this plan with the nurse or doctor.    ..........................................................................................................................................  Patient/Patient Representative Signature      ..........................................................................................................................................  Patient Representative Print Name and Relationship to Patient    ..................................................               ................................................  Date                                            Time    ..........................................................................................................................................  Reviewed by Signature/Title    ...................................................              ..............................................  Date                                                            Time

## 2018-07-25 NOTE — CONSULTS
64 yo woman admitted with 2 days of chest pain.  Troponin 1.6 with ongoing symptoms; EKG with diffuse prolonged QT and T wave inversion.  On warfarin for DVT/PE and INR 1.65 because she missed dose last PM.  She denies bleeding issues.  Consent obtained with risks, benefits and alternatives for coronary angiogram and PCI; she agreed to proceed.

## 2018-07-25 NOTE — PHARMACY-ANTICOAGULATION SERVICE
Clinical Pharmacy - Warfarin Dosing Consult     Pharmacy has been consulted to manage this patient s warfarin therapy.  Indication: Atrial Fibrillation  Therapy Goal: INR 2-3  Warfarin Prior to Admission: Yes  Warfarin PTA Regimen: 2.5 mg Monday and Friday; 5 mg on all other days  Dose Comments: Subtherpeutic and missed 7/24 dose    INR   Date Value Ref Range Status   07/25/2018 1.65 (H) 0.86 - 1.14 Final   05/24/2018 2.40 (H) 0.86 - 1.14 Final       Recommend warfarin 7.5 mg today.  Pharmacy will monitor Samira Peralta daily and order warfarin doses to achieve specified goal.      Please contact pharmacy as soon as possible if the warfarin needs to be held for a procedure or if the warfarin goals change.        Tae EckertD

## 2018-07-26 ENCOUNTER — APPOINTMENT (OUTPATIENT)
Dept: OCCUPATIONAL THERAPY | Facility: CLINIC | Age: 63
DRG: 287 | End: 2018-07-26
Attending: PHYSICIAN ASSISTANT
Payer: MEDICARE

## 2018-07-26 ENCOUNTER — APPOINTMENT (OUTPATIENT)
Dept: ULTRASOUND IMAGING | Facility: CLINIC | Age: 63
DRG: 287 | End: 2018-07-26
Attending: INTERNAL MEDICINE
Payer: MEDICARE

## 2018-07-26 LAB
ANION GAP SERPL CALCULATED.3IONS-SCNC: 9 MMOL/L (ref 3–14)
BUN SERPL-MCNC: 12 MG/DL (ref 7–30)
CALCIUM SERPL-MCNC: 8.2 MG/DL (ref 8.5–10.1)
CHLORIDE SERPL-SCNC: 100 MMOL/L (ref 94–109)
CHOLEST SERPL-MCNC: 172 MG/DL
CO2 SERPL-SCNC: 26 MMOL/L (ref 20–32)
CREAT SERPL-MCNC: 0.74 MG/DL (ref 0.52–1.04)
ERYTHROCYTE [DISTWIDTH] IN BLOOD BY AUTOMATED COUNT: 15.1 % (ref 10–15)
GFR SERPL CREATININE-BSD FRML MDRD: 79 ML/MIN/1.7M2
GLUCOSE BLDC GLUCOMTR-MCNC: 180 MG/DL (ref 70–99)
GLUCOSE BLDC GLUCOMTR-MCNC: 228 MG/DL (ref 70–99)
GLUCOSE BLDC GLUCOMTR-MCNC: 254 MG/DL (ref 70–99)
GLUCOSE SERPL-MCNC: 200 MG/DL (ref 70–99)
HCT VFR BLD AUTO: 34.4 % (ref 35–47)
HDLC SERPL-MCNC: 26 MG/DL
HGB BLD-MCNC: 11.4 G/DL (ref 11.7–15.7)
INR PPP: 1.46 (ref 0.86–1.14)
LDLC SERPL CALC-MCNC: ABNORMAL MG/DL
LDLC SERPL DIRECT ASSAY-MCNC: 99 MG/DL
MCH RBC QN AUTO: 30.7 PG (ref 26.5–33)
MCHC RBC AUTO-ENTMCNC: 33.1 G/DL (ref 31.5–36.5)
MCV RBC AUTO: 93 FL (ref 78–100)
NONHDLC SERPL-MCNC: 146 MG/DL
PLATELET # BLD AUTO: 197 10E9/L (ref 150–450)
POTASSIUM SERPL-SCNC: 3.7 MMOL/L (ref 3.4–5.3)
RBC # BLD AUTO: 3.71 10E12/L (ref 3.8–5.2)
SODIUM SERPL-SCNC: 135 MMOL/L (ref 133–144)
TRIGL SERPL-MCNC: 444 MG/DL
WBC # BLD AUTO: 5.9 10E9/L (ref 4–11)

## 2018-07-26 PROCEDURE — 93970 EXTREMITY STUDY: CPT

## 2018-07-26 PROCEDURE — 25000128 H RX IP 250 OP 636: Performed by: INTERNAL MEDICINE

## 2018-07-26 PROCEDURE — 99233 SBSQ HOSP IP/OBS HIGH 50: CPT | Performed by: INTERNAL MEDICINE

## 2018-07-26 PROCEDURE — 25000132 ZZH RX MED GY IP 250 OP 250 PS 637: Performed by: INTERNAL MEDICINE

## 2018-07-26 PROCEDURE — A9270 NON-COVERED ITEM OR SERVICE: HCPCS | Mod: GY | Performed by: INTERNAL MEDICINE

## 2018-07-26 PROCEDURE — 97110 THERAPEUTIC EXERCISES: CPT | Mod: GO

## 2018-07-26 PROCEDURE — 80061 LIPID PANEL: CPT | Performed by: PHYSICIAN ASSISTANT

## 2018-07-26 PROCEDURE — 40000133 ZZH STATISTIC OT WARD VISIT

## 2018-07-26 PROCEDURE — 97535 SELF CARE MNGMENT TRAINING: CPT | Mod: GO

## 2018-07-26 PROCEDURE — 36415 COLL VENOUS BLD VENIPUNCTURE: CPT | Performed by: PHYSICIAN ASSISTANT

## 2018-07-26 PROCEDURE — 85610 PROTHROMBIN TIME: CPT | Performed by: PHYSICIAN ASSISTANT

## 2018-07-26 PROCEDURE — 25000132 ZZH RX MED GY IP 250 OP 250 PS 637: Performed by: NURSE PRACTITIONER

## 2018-07-26 PROCEDURE — 80048 BASIC METABOLIC PNL TOTAL CA: CPT | Performed by: PHYSICIAN ASSISTANT

## 2018-07-26 PROCEDURE — 00000146 ZZHCL STATISTIC GLUCOSE BY METER IP

## 2018-07-26 PROCEDURE — 85027 COMPLETE CBC AUTOMATED: CPT | Performed by: PHYSICIAN ASSISTANT

## 2018-07-26 PROCEDURE — 25000131 ZZH RX MED GY IP 250 OP 636 PS 637: Mod: GY | Performed by: PHYSICIAN ASSISTANT

## 2018-07-26 PROCEDURE — 99232 SBSQ HOSP IP/OBS MODERATE 35: CPT | Performed by: INTERNAL MEDICINE

## 2018-07-26 PROCEDURE — A9270 NON-COVERED ITEM OR SERVICE: HCPCS | Mod: GY | Performed by: NURSE PRACTITIONER

## 2018-07-26 PROCEDURE — 25000132 ZZH RX MED GY IP 250 OP 250 PS 637: Mod: GY | Performed by: NURSE PRACTITIONER

## 2018-07-26 PROCEDURE — 12000000 ZZH R&B MED SURG/OB

## 2018-07-26 PROCEDURE — A9270 NON-COVERED ITEM OR SERVICE: HCPCS | Mod: GY | Performed by: PHYSICIAN ASSISTANT

## 2018-07-26 PROCEDURE — 83721 ASSAY OF BLOOD LIPOPROTEIN: CPT | Performed by: PHYSICIAN ASSISTANT

## 2018-07-26 PROCEDURE — 97165 OT EVAL LOW COMPLEX 30 MIN: CPT | Mod: GO

## 2018-07-26 PROCEDURE — 25000132 ZZH RX MED GY IP 250 OP 250 PS 637: Performed by: PHYSICIAN ASSISTANT

## 2018-07-26 PROCEDURE — 25000132 ZZH RX MED GY IP 250 OP 250 PS 637: Mod: GY | Performed by: INTERNAL MEDICINE

## 2018-07-26 RX ORDER — METOPROLOL TARTRATE 50 MG
50 TABLET ORAL ONCE
Status: COMPLETED | OUTPATIENT
Start: 2018-07-26 | End: 2018-07-26

## 2018-07-26 RX ORDER — WARFARIN SODIUM 7.5 MG/1
7.5 TABLET ORAL
Status: COMPLETED | OUTPATIENT
Start: 2018-07-26 | End: 2018-07-26

## 2018-07-26 RX ORDER — METOPROLOL TARTRATE 100 MG
100 TABLET ORAL 2 TIMES DAILY
Status: DISCONTINUED | OUTPATIENT
Start: 2018-07-26 | End: 2018-07-27

## 2018-07-26 RX ADMIN — HYDROCODONE BITARTRATE AND ACETAMINOPHEN 2 TABLET: 5; 325 TABLET ORAL at 09:59

## 2018-07-26 RX ADMIN — METOPROLOL TARTRATE 100 MG: 100 TABLET, FILM COATED ORAL at 21:28

## 2018-07-26 RX ADMIN — SIMETHICONE CHEW TAB 80 MG 80 MG: 80 TABLET ORAL at 21:26

## 2018-07-26 RX ADMIN — WARFARIN SODIUM 7.5 MG: 7.5 TABLET ORAL at 17:19

## 2018-07-26 RX ADMIN — PREGABALIN 150 MG: 75 CAPSULE ORAL at 08:11

## 2018-07-26 RX ADMIN — FUROSEMIDE 20 MG: 20 TABLET ORAL at 08:11

## 2018-07-26 RX ADMIN — HYDROCODONE BITARTRATE AND ACETAMINOPHEN 2 TABLET: 5; 325 TABLET ORAL at 05:26

## 2018-07-26 RX ADMIN — PREGABALIN 150 MG: 75 CAPSULE ORAL at 21:24

## 2018-07-26 RX ADMIN — CETIRIZINE HYDROCHLORIDE 10 MG: 10 TABLET, FILM COATED ORAL at 08:15

## 2018-07-26 RX ADMIN — TIZANIDINE 2 MG: 2 TABLET ORAL at 09:59

## 2018-07-26 RX ADMIN — SIMETHICONE CHEW TAB 80 MG 80 MG: 80 TABLET ORAL at 08:15

## 2018-07-26 RX ADMIN — METOPROLOL TARTRATE 50 MG: 50 TABLET ORAL at 08:11

## 2018-07-26 RX ADMIN — INSULIN ASPART 2 UNITS: 100 INJECTION, SOLUTION INTRAVENOUS; SUBCUTANEOUS at 10:00

## 2018-07-26 RX ADMIN — OMEPRAZOLE 20 MG: 20 CAPSULE, DELAYED RELEASE ORAL at 21:26

## 2018-07-26 RX ADMIN — HYDROCODONE BITARTRATE AND ACETAMINOPHEN 2 TABLET: 5; 325 TABLET ORAL at 21:38

## 2018-07-26 RX ADMIN — INSULIN ASPART 1 UNITS: 100 INJECTION, SOLUTION INTRAVENOUS; SUBCUTANEOUS at 17:14

## 2018-07-26 RX ADMIN — METOPROLOL TARTRATE 50 MG: 50 TABLET ORAL at 10:28

## 2018-07-26 RX ADMIN — DULOXETINE HYDROCHLORIDE 60 MG: 60 CAPSULE, DELAYED RELEASE ORAL at 21:26

## 2018-07-26 RX ADMIN — ENOXAPARIN SODIUM 30 MG: 30 INJECTION, SOLUTION INTRAVENOUS; SUBCUTANEOUS at 13:41

## 2018-07-26 RX ADMIN — OMEPRAZOLE 20 MG: 20 CAPSULE, DELAYED RELEASE ORAL at 08:15

## 2018-07-26 RX ADMIN — INSULIN ASPART 3 UNITS: 100 INJECTION, SOLUTION INTRAVENOUS; SUBCUTANEOUS at 13:41

## 2018-07-26 RX ADMIN — HYDROCODONE BITARTRATE AND ACETAMINOPHEN 2 TABLET: 5; 325 TABLET ORAL at 14:25

## 2018-07-26 RX ADMIN — ATORVASTATIN CALCIUM 40 MG: 40 TABLET, FILM COATED ORAL at 21:24

## 2018-07-26 RX ADMIN — PREGABALIN 150 MG: 75 CAPSULE ORAL at 17:15

## 2018-07-26 RX ADMIN — ASPIRIN 81 MG: 81 TABLET, COATED ORAL at 08:15

## 2018-07-26 RX ADMIN — DULOXETINE HYDROCHLORIDE 60 MG: 60 CAPSULE, DELAYED RELEASE ORAL at 08:11

## 2018-07-26 ASSESSMENT — PAIN DESCRIPTION - DESCRIPTORS
DESCRIPTORS: SHARP
DESCRIPTORS: ACHING
DESCRIPTORS: ACHING

## 2018-07-26 ASSESSMENT — ACTIVITIES OF DAILY LIVING (ADL)
PREVIOUS_RESPONSIBILITIES: MEAL PREP;HOUSEKEEPING;LAUNDRY;SHOPPING;MEDICATION MANAGEMENT;FINANCES
ADLS_ACUITY_SCORE: 20
ADLS_ACUITY_SCORE: 20

## 2018-07-26 NOTE — PLAN OF CARE
Problem: Cardiac: ACS (Acute Coronary Syndrome) (Adult)  Goal: Signs and Symptoms of Listed Potential Problems Will be Absent, Minimized or Managed (Cardiac: ACS)  Signs and symptoms of listed potential problems will be absent, minimized or managed by discharge/transition of care (reference Cardiac: ACS (Acute Coronary Syndrome) (Adult) CPG).   Outcome: No Change  Monitor vitals.    Problem: Cardiac Disease Comorbidity  Goal: Cardiac Disease  Patient comorbidity will be monitored for signs and symptoms of Cardiac Disease.  Problems will be absent, minimized or managed by discharge/transition of care.   Outcome: No Change  Monitor VSS.    Problem: Cardiac Catheterization (Diagnostic/Interventional) (Adult)  Goal: Signs and Symptoms of Listed Potential Problems Will be Absent, Minimized or Managed (Cardiac Catheterization)  Signs and symptoms of listed potential problems will be absent, minimized or managed by discharge/transition of care (reference Cardiac Catheterization (Diagnostic/Interventional) (Adult) CPG).   Outcome: No Change  Right groin site is dry and intact.

## 2018-07-26 NOTE — PROGRESS NOTES
07/26/18 0815   Quick Adds   Type of Visit Initial Occupational Therapy Evaluation   Living Environment   Lives With alone   Living Arrangements apartment   Home Accessibility bed and bath on same level   Number of Stairs to Enter Home 0   Number of Stairs Within Home (has an elevator)   Transportation Available family or friend will provide   Living Environment Comment Sister can assist as needed however intermittently    Self-Care   Usual Activity Tolerance good   Current Activity Tolerance moderate   Regular Exercise no   Equipment Currently Used at Home cane, straight;walker, rolling   Functional Level Prior   Ambulation 1-->assistive equipment   Transferring 1-->assistive equipment   Toileting 1-->assistive equipment   Bathing 0-->independent   Dressing 0-->independent   Fall history within last six months yes   Number of times patient has fallen within last six months 3   Prior Functional Level Comment Prior pt independent in all ADLs. Pt has chronic back pain    General Information   Onset of Illness/Injury or Date of Surgery - Date 07/25/18   Referring Physician Yennifer Purcell PA-C   Patient/Family Goals Statement Home    Additional Occupational Profile Info/Pertinent History of Current Problem Stress cardiomyopathy   Precautions/Limitations fall precautions   Heart Disease Risk Factors Race;Age;Gender;Medical history;Lack of physical activity   Cognitive Status Examination   Orientation orientation to person, place and time   Level of Consciousness alert   Visual Perception   Visual Perception Wears glasses   Sensory Examination   Sensory Quick Adds (numbness/tingling in B LE and BUE's)   Pain Assessment   Patient Currently in Pain Yes, see Vital Sign flowsheet   Range of Motion (ROM)   ROM Quick Adds (At baseline, pt reports limitations: RUE external rotation )   Transfer Skills   Transfer Transfer Safety Analysis Bed/Chair;Transfer Skill: Stand to Sit;Transfer Safety Analysis Sit/Stand   Transfer  "Skill: Bed to Chair/Chair to Bed   Level of [place]: Bed to Chair contact guard   Transfer Skill: Sit to Stand   Level of [place]: Sit/Stand stand-by assist   Instrumental Activities of Daily Living (IADL)   Previous Responsibilities meal prep;housekeeping;laundry;shopping;medication management;finances   General Therapy Interventions   Planned Therapy Interventions ADL retraining;IADL retraining;risk factor education;progressive activity/exercise;home program guidelines;transfer training;strengthening   Clinical Impression   Criteria for Skilled Therapeutic Interventions Met yes, treatment indicated   OT Diagnosis Decreased endurance for I/ADLs   Influenced by the following impairments Decreased endurance for I/ADLs   Assessment of Occupational Performance 1-3 Performance Deficits   Identified Performance Deficits Decreased endurance for I/ADLs (dressing, bathing, toileting)   Clinical Decision Making (Complexity) Low complexity   Therapy Frequency daily   Predicted Duration of Therapy Intervention (days/wks) 4 days   Anticipated Discharge Disposition Home with Home Therapy;Transitional Care Facility  (Pt not receptive to TCU)   Risks and Benefits of Treatment have been explained. Yes   Patient, Family & other staff in agreement with plan of care Yes   [facility] TM \"6 Clicks\"   2016, Trustees of [facility], under license to [employer].  All rights reserved.   6 Clicks Short Forms Daily Activity Inpatient Short Form   [facility]  \"6 Clicks\" Daily Activity Inpatient Short Form   1. Putting on and taking off regular lower body clothing? 3 - A Little   2. Bathing (including washing, rinsing, drying)? 3 - A Little   3. Toileting, which includes using toilet, bedpan or urinal? 3 - A Little   4. Putting on and taking off regular upper body clothing? 3 - A Little   5. Taking care of personal grooming such as brushing teeth? 3 - A Little   6. Eating meals? 4 - None   Daily " Activity Raw Score (Score out of 24.Lower scores equate to lower levels of function) 19   Total Evaluation Time   Total Evaluation Time (Minutes) 8
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
## 2018-07-26 NOTE — PLAN OF CARE
Problem: Patient Care Overview  Goal: Plan of Care/Patient Progress Review  Outcome: No Change  Patient up in the room with stand by assist and cane, back pain controlled with pain medications, tolerating food. Blood pressure is elevated increased metoprolol to 100 mg 2 times daily. phycology consult was ordered by hospitalist. Continue to monitor.

## 2018-07-26 NOTE — PLAN OF CARE
Problem: Patient Care Overview  Goal: Plan of Care/Patient Progress Review  OT/CR: Evaluation and treatment initiated. Pt lives independently in an apartment. Prior pt independent in all ADLs and some IADLs. Sister assists in driving. Pt uses a cane at baseline and has a walker.   Discharge Planner OT   Patient plan for discharge: Eager to return home. Plans on moving to a new apartment within the next week  Current status: Pt ambulated in the hallway with SBA/CGA with cane for 200 feet. Pt demonstrated increase in fatigue and SOB. Pt reported increase in back pain. Pt had 1 LOB while ambulating in the hallway, requiring minimum assist to correct. Pt ambulated with extra time and required 3 standing rest breaks. HR and BP elevated with activity, see vitals flowsheet for details   Barriers to return to prior living situation: lives alone, current level of assist, fall history   Recommendations for discharge: Pt would benefit from TCU, however sternly declines. Pt receptive to home OT/PT  Rationale for recommendations: Pt would benefit from continued skilled IP OT/CR to increase functional independence in I/ADLs.        Entered by: Mahad Yates 07/26/2018 9:02 AM

## 2018-07-26 NOTE — PLAN OF CARE
Problem: Patient Care Overview  Goal: Plan of Care/Patient Progress Review  Outcome: Improving  VSS. Alert & oriented. Tele: SR w/ PACs; HR 70s- 80s, pt went in A fib RVR and converted back to SR w/ PACs on her own. Room air. Ax1-2 to bedside commode. C/o lower back pain. Right groin site is C/D/I, CMS intact. Plan: cardiac rehab, PT, social work today and then discharge when stable. Will continue to monitor.

## 2018-07-26 NOTE — PLAN OF CARE
Problem: Patient Care Overview  Goal: Plan of Care/Patient Progress Review  PT:  Discharge Planner PT   Patient plan for discharge: Return home  Current status: Orders received, chart reviewed, discussed with OT/CR. PT can be deferred to home setting, no skilled inpatient urgent needs at this time.   Barriers to return to prior living situation: None  Recommendations for discharge: Return home  Rationale for recommendations: No skilled inpatient PT needs at this time. Defer to home setting.       Entered by: Isabelle Alford 07/26/2018 1:25 PM

## 2018-07-27 ENCOUNTER — APPOINTMENT (OUTPATIENT)
Dept: CARDIOLOGY | Facility: CLINIC | Age: 63
DRG: 287 | End: 2018-07-27
Attending: NURSE PRACTITIONER
Payer: MEDICARE

## 2018-07-27 LAB
GLUCOSE BLDC GLUCOMTR-MCNC: 176 MG/DL (ref 70–99)
GLUCOSE BLDC GLUCOMTR-MCNC: 189 MG/DL (ref 70–99)
GLUCOSE BLDC GLUCOMTR-MCNC: 198 MG/DL (ref 70–99)
GLUCOSE BLDC GLUCOMTR-MCNC: 210 MG/DL (ref 70–99)
GLUCOSE BLDC GLUCOMTR-MCNC: 220 MG/DL (ref 70–99)
GLUCOSE BLDC GLUCOMTR-MCNC: 220 MG/DL (ref 70–99)
GLUCOSE BLDC GLUCOMTR-MCNC: 242 MG/DL (ref 70–99)
INR PPP: 1.82 (ref 0.86–1.14)

## 2018-07-27 PROCEDURE — 36415 COLL VENOUS BLD VENIPUNCTURE: CPT | Performed by: INTERNAL MEDICINE

## 2018-07-27 PROCEDURE — 93321 DOPPLER ECHO F-UP/LMTD STD: CPT | Mod: 26 | Performed by: INTERNAL MEDICINE

## 2018-07-27 PROCEDURE — 12000000 ZZH R&B MED SURG/OB

## 2018-07-27 PROCEDURE — 93308 TTE F-UP OR LMTD: CPT | Mod: 26 | Performed by: INTERNAL MEDICINE

## 2018-07-27 PROCEDURE — 25000132 ZZH RX MED GY IP 250 OP 250 PS 637: Mod: GY | Performed by: NURSE PRACTITIONER

## 2018-07-27 PROCEDURE — 99233 SBSQ HOSP IP/OBS HIGH 50: CPT | Performed by: INTERNAL MEDICINE

## 2018-07-27 PROCEDURE — A9270 NON-COVERED ITEM OR SERVICE: HCPCS | Mod: GY | Performed by: PHYSICIAN ASSISTANT

## 2018-07-27 PROCEDURE — 25000128 H RX IP 250 OP 636: Performed by: PHYSICIAN ASSISTANT

## 2018-07-27 PROCEDURE — A9270 NON-COVERED ITEM OR SERVICE: HCPCS | Mod: GY | Performed by: INTERNAL MEDICINE

## 2018-07-27 PROCEDURE — 40000556 ZZH STATISTIC PERIPHERAL IV START W US GUIDANCE

## 2018-07-27 PROCEDURE — 25000132 ZZH RX MED GY IP 250 OP 250 PS 637: Mod: GY | Performed by: INTERNAL MEDICINE

## 2018-07-27 PROCEDURE — 85610 PROTHROMBIN TIME: CPT | Performed by: INTERNAL MEDICINE

## 2018-07-27 PROCEDURE — 25000132 ZZH RX MED GY IP 250 OP 250 PS 637: Mod: GY | Performed by: PHYSICIAN ASSISTANT

## 2018-07-27 PROCEDURE — A9270 NON-COVERED ITEM OR SERVICE: HCPCS | Mod: GY | Performed by: NURSE PRACTITIONER

## 2018-07-27 PROCEDURE — 93325 DOPPLER ECHO COLOR FLOW MAPG: CPT | Mod: 26 | Performed by: INTERNAL MEDICINE

## 2018-07-27 PROCEDURE — 25000128 H RX IP 250 OP 636: Performed by: INTERNAL MEDICINE

## 2018-07-27 PROCEDURE — 99222 1ST HOSP IP/OBS MODERATE 55: CPT | Performed by: PSYCHIATRY & NEUROLOGY

## 2018-07-27 PROCEDURE — 25000132 ZZH RX MED GY IP 250 OP 250 PS 637: Mod: GY | Performed by: PSYCHIATRY & NEUROLOGY

## 2018-07-27 PROCEDURE — 25500064 ZZH RX 255 OP 636: Performed by: INTERNAL MEDICINE

## 2018-07-27 PROCEDURE — A9270 NON-COVERED ITEM OR SERVICE: HCPCS | Mod: GY | Performed by: PSYCHIATRY & NEUROLOGY

## 2018-07-27 PROCEDURE — 00000146 ZZHCL STATISTIC GLUCOSE BY METER IP

## 2018-07-27 PROCEDURE — 93308 TTE F-UP OR LMTD: CPT

## 2018-07-27 RX ORDER — RISPERIDONE 1 MG/1
1 TABLET ORAL AT BEDTIME
Status: DISCONTINUED | OUTPATIENT
Start: 2018-07-27 | End: 2018-07-28 | Stop reason: HOSPADM

## 2018-07-27 RX ORDER — GLUCOSAMINE HCL/CHONDROITIN SU 500-400 MG
CAPSULE ORAL
Qty: 100 EACH | Refills: 3 | Status: SHIPPED | OUTPATIENT
Start: 2018-07-27

## 2018-07-27 RX ORDER — METOPROLOL TARTRATE 100 MG
100 TABLET ORAL 2 TIMES DAILY
Qty: 180 TABLET | Refills: 0 | Status: SHIPPED | OUTPATIENT
Start: 2018-07-27 | End: 2018-07-27

## 2018-07-27 RX ORDER — RISPERIDONE 1 MG/1
1 TABLET ORAL AT BEDTIME
Qty: 60 TABLET
Start: 2018-07-27 | End: 2019-01-01

## 2018-07-27 RX ORDER — METOPROLOL TARTRATE 75 MG/1
75 TABLET, FILM COATED ORAL 2 TIMES DAILY
Qty: 180 TABLET | Refills: 0 | Status: SHIPPED | OUTPATIENT
Start: 2018-07-27 | End: 2019-01-01

## 2018-07-27 RX ORDER — WARFARIN SODIUM 5 MG/1
5 TABLET ORAL
Status: COMPLETED | OUTPATIENT
Start: 2018-07-27 | End: 2018-07-27

## 2018-07-27 RX ORDER — ATORVASTATIN CALCIUM 40 MG/1
40 TABLET, FILM COATED ORAL EVERY EVENING
Qty: 90 TABLET | Refills: 0 | Status: SHIPPED | OUTPATIENT
Start: 2018-07-27

## 2018-07-27 RX ORDER — LANCETS
EACH MISCELLANEOUS
Qty: 100 EACH | Refills: 0 | Status: SHIPPED | OUTPATIENT
Start: 2018-07-27

## 2018-07-27 RX ADMIN — METOPROLOL TARTRATE 75 MG: 50 TABLET ORAL at 20:23

## 2018-07-27 RX ADMIN — TIZANIDINE 2 MG: 2 TABLET ORAL at 21:27

## 2018-07-27 RX ADMIN — ENOXAPARIN SODIUM 30 MG: 30 INJECTION, SOLUTION INTRAVENOUS; SUBCUTANEOUS at 14:38

## 2018-07-27 RX ADMIN — OMEPRAZOLE 20 MG: 20 CAPSULE, DELAYED RELEASE ORAL at 20:24

## 2018-07-27 RX ADMIN — BACLOFEN 20 MG: 20 TABLET ORAL at 14:39

## 2018-07-27 RX ADMIN — DULOXETINE HYDROCHLORIDE 60 MG: 60 CAPSULE, DELAYED RELEASE ORAL at 08:33

## 2018-07-27 RX ADMIN — ATORVASTATIN CALCIUM 40 MG: 40 TABLET, FILM COATED ORAL at 20:23

## 2018-07-27 RX ADMIN — FUROSEMIDE 20 MG: 20 TABLET ORAL at 08:33

## 2018-07-27 RX ADMIN — SODIUM CHLORIDE: 9 INJECTION, SOLUTION INTRAVENOUS at 02:05

## 2018-07-27 RX ADMIN — HYDROCODONE BITARTRATE AND ACETAMINOPHEN 2 TABLET: 5; 325 TABLET ORAL at 15:59

## 2018-07-27 RX ADMIN — OMEPRAZOLE 20 MG: 20 CAPSULE, DELAYED RELEASE ORAL at 08:33

## 2018-07-27 RX ADMIN — RISPERIDONE 1 MG: 1 TABLET ORAL at 21:27

## 2018-07-27 RX ADMIN — HYDROCODONE BITARTRATE AND ACETAMINOPHEN 2 TABLET: 5; 325 TABLET ORAL at 21:30

## 2018-07-27 RX ADMIN — SIMETHICONE CHEW TAB 80 MG 80 MG: 80 TABLET ORAL at 20:23

## 2018-07-27 RX ADMIN — WARFARIN SODIUM 5 MG: 5 TABLET ORAL at 17:25

## 2018-07-27 RX ADMIN — PREGABALIN 150 MG: 75 CAPSULE ORAL at 15:59

## 2018-07-27 RX ADMIN — SIMETHICONE CHEW TAB 80 MG 80 MG: 80 TABLET ORAL at 08:33

## 2018-07-27 RX ADMIN — DULOXETINE HYDROCHLORIDE 60 MG: 60 CAPSULE, DELAYED RELEASE ORAL at 20:23

## 2018-07-27 RX ADMIN — INSULIN ASPART 1 UNITS: 100 INJECTION, SOLUTION INTRAVENOUS; SUBCUTANEOUS at 14:38

## 2018-07-27 RX ADMIN — HYDROCODONE BITARTRATE AND ACETAMINOPHEN 2 TABLET: 5; 325 TABLET ORAL at 08:33

## 2018-07-27 RX ADMIN — TIZANIDINE 2 MG: 2 TABLET ORAL at 02:04

## 2018-07-27 RX ADMIN — HYDROCODONE BITARTRATE AND ACETAMINOPHEN 2 TABLET: 5; 325 TABLET ORAL at 03:41

## 2018-07-27 RX ADMIN — METOPROLOL TARTRATE 100 MG: 100 TABLET, FILM COATED ORAL at 08:33

## 2018-07-27 RX ADMIN — PREGABALIN 150 MG: 75 CAPSULE ORAL at 08:33

## 2018-07-27 RX ADMIN — INSULIN ASPART 1 UNITS: 100 INJECTION, SOLUTION INTRAVENOUS; SUBCUTANEOUS at 17:21

## 2018-07-27 RX ADMIN — CETIRIZINE HYDROCHLORIDE 10 MG: 10 TABLET, FILM COATED ORAL at 08:33

## 2018-07-27 RX ADMIN — ENOXAPARIN SODIUM 30 MG: 30 INJECTION, SOLUTION INTRAVENOUS; SUBCUTANEOUS at 00:40

## 2018-07-27 RX ADMIN — ASPIRIN 81 MG: 81 TABLET, COATED ORAL at 08:33

## 2018-07-27 RX ADMIN — INSULIN ASPART 2 UNITS: 100 INJECTION, SOLUTION INTRAVENOUS; SUBCUTANEOUS at 09:06

## 2018-07-27 RX ADMIN — PREGABALIN 150 MG: 75 CAPSULE ORAL at 21:27

## 2018-07-27 ASSESSMENT — ACTIVITIES OF DAILY LIVING (ADL)
ADLS_ACUITY_SCORE: 20
ADLS_ACUITY_SCORE: 19
ADLS_ACUITY_SCORE: 20
ADLS_ACUITY_SCORE: 19
ADLS_ACUITY_SCORE: 19
ADLS_ACUITY_SCORE: 20

## 2018-07-27 NOTE — CONSULTS
SW Consult Note:    Care Transition Initial Assessment - BIB  Reason For Consult: discharge planning  Met with: Patient    Active Problems:    ACS (acute coronary syndrome) (H)       DATA  Lives With: alone  Living Arrangements: apartment  Description of Support System: Supportive, Involved  Who is your support system?: Sibling(s)  Support Assessment: Adequate family and caregiver support, Adequate social supports.   Identified issues/concerns regarding health management: Per social service protocol for discharge planning, patient was admitted on 7/25/18 with ACS.  Reviewed chart and spoke with patient regarding discharge plans.  Patient stated that she is going to discharge to home.  SW offered to discuss additional services and home care needs and patient stated that she did not feel she needed anything additional at this time.  She is going to be moving out of her apartment and will probably stay with her sister until she has a new place to live.  SW provided moving company information to assist when she has to move.             Quality Of Family Relationships: supportive, helpful, involved  Transportation Available: family or friend will provide    ASSESSMENT  Cognitive Status:  awake, alert and oriented  Concerns to be addressed: Discharge services.     PLAN  Financial costs for the patient includes:   Patient given options and choices for discharge: Patient to discharge to home.   Patient/family is agreeable to the plan?  Yes  Patient Goals and Preferences: Home  Patient anticipates discharging to:  Home    DEVORAH Hernandez, ANN

## 2018-07-27 NOTE — PLAN OF CARE
Problem: Patient Care Overview  Goal: Plan of Care/Patient Progress Review  OT/CR: Attempted intervention; however, pt having ECHO and then needing nursing cares.

## 2018-07-27 NOTE — PROGRESS NOTES
[facility] Cardiology Progress Note    62 yo woman admitted with 2 days of chest pain. Troponin 1.6 with ongoing symptoms; EKG with diffuse prolonged QT and T wave inversion. On warfarin for DVT/PE. Angiogram - no obstructive disease and confirming stress myopathy.          Assessment and Plan:     1. Stress myopathy, on metoprolol but allergic to ACEI (?).       Repeated echo after 2 days and improving LVEF about 45% now..  2. Prolonged QTc on EKG - recheck EKG.         Significant Problems:     Patient Active Problem List    Diagnosis Date Noted     ACS (acute coronary syndrome) (H) 07/25/2018     Priority: Medium     Perioral dermatitis 03/29/2013     Priority: Medium     Rosacea 03/29/2013     Priority: Medium     External ear canal stenosis, acquired 05/25/2012     Priority: Medium             Subjective:     Feeling better with minimal to no chest pain.          Medications:   Scheduled:    aspirin  81 mg Oral Daily     atorvastatin  40 mg Oral QPM     cetirizine  10 mg Oral Daily     DULoxetine  60 mg Oral BID     enoxaparin  30 mg Subcutaneous Q12H     furosemide (LASIX) tablet 20 mg  20 mg Oral Daily     insulin aspart  1-7 Units Subcutaneous TID AC     insulin aspart  1-5 Units Subcutaneous At Bedtime     metoprolol tartrate (LOPRESSOR) tablet 75 mg  75 mg Oral BID     omeprazole  20 mg Oral BID     perflutren diluted 1mL to 2mL with saline  9 mL Intravenous Once     pregabalin  150 mg Oral TID     risperiDONE  1 mg Oral At Bedtime     simethicone (MYLICON) chewable tablet 80 mg  80 mg Oral BID     sodium chloride (PF)  10 mL Intravenous Once     sodium chloride (PF)  3 mL Intracatheter Q8H            Physical Exam:   All vitals have been reviewed  Temp:  [98.2  F (36.8  C)-99.4  F (37.4  C)] 98.5  F (36.9  C)  Pulse:  [89] 89  Heart Rate:  [52-79] 60  Resp:  [16-20] 16  BP: (109-147)/(56-68) 147/65  SpO2:  [93 %-97 %] 94 %  Vitals:    07/25/18 0920 07/26/18 0615 07/27/18 0635   Weight: 111.1 kg  (245 lb) 112.4 kg (247 lb 11.2 oz) 113.2 kg (249 lb 9 oz)     I/O last 3 completed shifts:  In: 600 [P.O.:600]  Out: -              Data:   Last Basic Metabolic Panel:  Lab Results   Component Value Date     07/26/2018      Lab Results   Component Value Date    POTASSIUM 3.7 07/26/2018     Lab Results   Component Value Date    CHLORIDE 100 07/26/2018     Lab Results   Component Value Date    MIRTHA 8.2 07/26/2018     Lab Results   Component Value Date    CO2 26 07/26/2018     Lab Results   Component Value Date    BUN 12 07/26/2018     Lab Results   Component Value Date    CR 0.74 07/26/2018     Lab Results   Component Value Date     07/26/2018          Lab Results   Component Value Date    WBC 5.9 07/26/2018    HGB 11.4 (L) 07/26/2018    HCT 34.4 (L) 07/26/2018    MCV 93 07/26/2018     07/26/2018     Lab Results   Component Value Date    INR 1.82 (H) 07/27/2018     Lab Results   Component Value Date    TROPI 1.235 (HH) 07/25/2018    TROPI 1.406 (HH) 07/25/2018    TROPI 1.610 (HH) 07/25/2018         Rebecca Beck MD  7/27/2018

## 2018-07-27 NOTE — PLAN OF CARE
Problem: Patient Care Overview  Goal: Plan of Care/Patient Progress Review  Outcome: No Change  Note from 1336-9112:  A&Ox4. Paranoid and anxious about roommate. Pt. has hx of schizophrenia. Pt. easily reoriented. VSS. Afebrile. Tele Afib-RVR. Pt. c/o chronic back pain, Norco given x1 for some relief. Hot packs applied to back. Pt. denies nausea. Voiding spontaneously. Up with SBA to bathroom. No acute events. Pt. rested comfortably between cares. Plan for psych eval tomorrow. Pt. transferred to  at 2230. Report called to RN.

## 2018-07-27 NOTE — PLAN OF CARE
Problem: Patient Care Overview  Goal: Plan of Care/Patient Progress Review  Outcome: No Change  A&Ox4. VSS on RA. Reports back pain 8/10- treated with PRNs and effective. NS infusing at 75 mL/hr. SBA to BR. Tele SB. Denies chest pain. . Right groin area CDI from previous angio, LLE dressing also CDI. Hx of paranoid schizophrenia but was calm throughout shift. DC pending. Nursing will continue to monitor.

## 2018-07-27 NOTE — CONSULTS
"Consult Date:  07/27/2018      PSYCHIATRY CONSULTATION       REQUESTING PHYSICIAN:  Sudeep Willis MD       REASON FOR CONSULTATION:  Schizophrenia, paranoid thinking, possible psych transfer.      IDENTIFYING DATA:  The patient is a 63-year-old woman who comes in after a fall.  She has chronic pain and history of CVA, status post thrombectomy with atrial fibrillation.  She lives independently and during this hospitalization she has demonstrated some paranoia.      CHIEF COMPLAINT:  \"I know my neighbor was here, I've been taping him.\"        HISTORY OF PRESENT ILLNESS:  The patient is a 63-year-old woman who appears to have a baseline psychotic disorder which is untreated.  She was admitted after a fall, she lives independently.  She says she misjudged the distance while getting out of her bed.  She had no loss of consciousness.  During the course of her stay, the patient has been guarded and somewhat paranoid.  Notes reflect that she was convinced her neighbor was here on the medical unit, she has been anxious about her roommate, very guarded with the staff.  She is not suicidal or homicidal and not demonstrating any dangerous behavior.  Notes reflect that she has been offered Risperdal in the past.  She tells me that she has no interest in taking medications, though told me that she would be possibly willing to try the Risperdal.  She acknowledges a history of hearing voices.  She is steadfast in her conviction that her neighbor as been harassing her because she can \"hear him through the ceiling and walls.\"  She is mentioning that she might be moving from her apartment to get away from all that.  She was calm during my visit.  She was not combative, not making any threats to self or others.      On further questioning, she minimizes any other psychiatric symptoms such as kemal, generalized anxiety, panic, obsessive-compulsive history, eating disorder history or trauma history.  It looks like she is taking some " Cymbalta, presumably because of her chronic pain and has been on chronic opiate medication including hydrocodone prior to coming in the hospital along with Zanaflex.  She mentions being on methadone in the past, but denies a chemical dependency history.  A small dose of p.r.n. Xanax    0.5 mg daily is noted in the record along with some Baclofen 20 mg t.i.d. p.r.n. as well.      PAST PSYCHIATRIC HISTORY:  This is poorly defined, but it looks to me like she may have a baseline psychotic illness which is untreated.  She denies being hospitalized.  Says at one point she was offered Risperdal when she was living in [place] when she sought care at [facility], but she never took it.      PAST CHEMICAL DEPENDENCY HISTORY:  She denies this, but it looks like she may have had long-term dependence on opiate analgesics for her chronic pain.      PAST MEDICAL HISTORY:  Per chart review includes a history of CVA, DVT, atrial fibrillation, chronic pain with complex regional pain syndrome, obstructive sleep apnea, hypertension, brachial plexus surgery.      MEDICATIONS:  Prior to admission:  Tylenol, albuterol, Xanax 0.5 mg every day p.r.n., Baclofen, Zyrtec, Voltaren, Cymbalta 60 mg b.i.d., Lasix, hydrocodone 1 tablet t.i.d., hydrocortisone cream, metoprolol, multivitamin, omeprazole, MiraLax, potassium supplements, Lyrica, simethicone, Zanaflex, warfarin.      FAMILY HISTORY:  She mentions a sister who has a son with psychotic illness.      SOCIAL HISTORY:  The patient is , I believe she has 3 kids.  Says she is on long-term disability and lives in the [facility].  She had been living in [place], but actually grew up in the [facility], has 1 year of college education.  She is a bit guarded with me and not willing to give me much detailed history.      REVIEW OF SYSTEMS:  A 10-point review of systems is unchanged from Yennifer Purcell PA-C, H and P dated 07/25/2018 at 2:21 p.m.        VITAL SIGNS:  Most  "recent vital signs:  Temp 98.2, pulse 89, respiratory rate 16, blood pressure 124/56, oxygen saturation 94%.      MENTAL STATUS EXAMINATION:  Appearance:  The patient is a guarded woman lying in bed.  She is calm, not agitated.  She is judged to be a marginal historian.  Speech is of normal rate, flow and tone, use of language appropriate.  Motor exam calm.  Coordination, station, gait not tested.  Muscle strength and tone adequate.  Affect is slightly blunted.  Mood \"fine.\"  Thought process generally logical, coherent and goal directed.  No loosening of associations, no flight of ideas.  No formal thought disorder on exam.  Thought content positive for what appears to be some paranoid delusions.  She is fixated on an individual in her apartment who she says is harassing her.  She presents very guarded.  She mentions hearing voices in the past, but denies this currently.  She denies thoughts of wanting to hurt self or others.  Insight is poor.  Judgment mildly impaired.  Cognitive exam:  The patient is alert and oriented x 3.  Concentration fair.  Recent and remote memory intact.  General fund of knowledge average.      IMPRESSION:  The patient is a 63-year-old woman who appears to have a baseline psychotic disorder, she comes in after a fall.  At this point she is refusing psychiatry transfer and really declined taking Risperdal, though I wrote for a 1 mg at bedtime dose which she said she would consider taking.  She is adamant she will not move to psychiatry and I do not have grounds to place her on a hold.  She does not meet the statutory requirement for a 72-hour hold or commitment at this time in my opinion, unless additional information becomes available to substantiate that she is a danger to self or others or unable to meet her basic needs.      DIAGNOSES:   1.  Psychotic disorder, not otherwise specified.   2.  Rule out schizophrenia, paranoid subtype.   3.  Chronic back pain with complex regional pain " syndrome, on chronic opiates.   4.  Recent fall.      PLAN:   1.  We will offer Risperdal 1 mg each day at bedtime to target delusional thinking.   2.  At this point, she is flatly refusing psychiatry transfer and flatly refusing any kind of outpatient followup.   3.  Re-consult psychiatry if the situation changes dramatically whereby there would be concern that she is a danger to self or others, currently I just do not see enough information to force psychiatry transfer and once she is deemed medically stable, she can be discharged.         DENNISE REYNOLDS MD             D: 2018   T: 2018   MT: HEYDI      Name:     KARLA CHÁVEZ   MRN:      [id number]        Account:       [id number]   :      1955           Consult Date:  2018      Document: [id number]       cc: Maynor Strauss MD, [facility]

## 2018-07-27 NOTE — PROGRESS NOTES
HOSPITALIST SERVICE CROSS-COVER NOTE:    Placed an order for transfer to medical unit with telemetry per RN request as patient is annoyed with her roommate. Dr. Beck of cardiology is OK with transfer.      Danisha Thakur MD  Hospitalist  Pager # [phone]

## 2018-07-27 NOTE — PROGRESS NOTES
"Doing well post-catheterization.  Aware of findings - no CAD, as we discussed after procedure yesterday.    Blood pressure 111/67, pulse 89, temperature 98.7  F (37.1  C), temperature source Oral, resp. rate 20, height 1.6 m (5' 3\"), weight 112.4 kg (247 lb 11.2 oz), SpO2 97 %.    Stress myopathy, on metoprolol.  Repeat echo in 1 to 2 days.  "

## 2018-07-27 NOTE — PLAN OF CARE
Problem: Patient Care Overview  Goal: Plan of Care/Patient Progress Review  Outcome: No Change  Pt A/Ox4, answers appropriately, c/o low back pain. Pt states unable to do blood sugars at home because glucometer does not work. States average blood sugars were running normal. Pt would prefer private room. Plan psyc consult tomorrow. Will continue to monitor.

## 2018-07-27 NOTE — PROGRESS NOTES
Patient arrived to unit. A&O, no chest pain. Mild back pain relieved with heat. Plan to see psychiatry in the morning.

## 2018-07-27 NOTE — PLAN OF CARE
Problem: Patient Care Overview  Goal: Plan of Care/Patient Progress Review  Outcome: Improving  A&Ox4. SBA/FR. Tele Sinus dayanara with HR in high 40s -50s, asymptomatic, MD aware, decreased metoprolol from 100 mg BID to 75 mg BID. Cardiology following. Other VSS, O2 wnl RA. Tolerating mod carb/2 gm Na, good appetite. Blood sugars 220 and 189. New dressing on Rt shin wound. IVF discontinued. C/o lower back pain 8/10, given Oxycodone x1 and Baclofen x1, with some relief. Psych saw the pt, started on Risperdal 1 mg daily. Possible discharge home today or tomorrow, pending clearance from Cardiology. RN will cont to monitor.

## 2018-07-27 NOTE — PROVIDER NOTIFICATION
Dr. Beck here to see patient, patient very anxious and gripping side rails. Pt has a non-English speaking roommate and has delusions and paranoia, history of schizophrenia. Psych to see tomorrow for possible transfer to inpatient psych. No private rooms available on [facility]. Per Dr. Beck, patient can go to medical floor with tele. Spoke with Dr. Díaz who will enter transfer orders.

## 2018-07-27 NOTE — CONSULTS
Received MD consult - new diabetic, diet education.  Pt was admitted after a mechanical fall at home. She is on a mod carb, 2 gm diet with good intake per flow sheets.    Pt was seen today by psych due to schizophrenia and paranoid thoughts.  Per notes she is guarded and somewhat paranoid, convinced her neighbor was here.    She is not appropriate for education at this time, recommend referral to OP diabetes management program.  Will follow-up next week for ed as appropriate.    Nan Irving RD  Pager [phone] (M-F)            [phone] (W/E & Hol)

## 2018-07-28 VITALS
WEIGHT: 251.77 LBS | RESPIRATION RATE: 16 BRPM | BODY MASS INDEX: 44.61 KG/M2 | DIASTOLIC BLOOD PRESSURE: 59 MMHG | HEART RATE: 66 BPM | HEIGHT: 63 IN | OXYGEN SATURATION: 95 % | TEMPERATURE: 98.3 F | SYSTOLIC BLOOD PRESSURE: 128 MMHG

## 2018-07-28 LAB
ANION GAP SERPL CALCULATED.3IONS-SCNC: 9 MMOL/L (ref 3–14)
BUN SERPL-MCNC: 19 MG/DL (ref 7–30)
CALCIUM SERPL-MCNC: 8.6 MG/DL (ref 8.5–10.1)
CHLORIDE SERPL-SCNC: 103 MMOL/L (ref 94–109)
CO2 SERPL-SCNC: 27 MMOL/L (ref 20–32)
CREAT SERPL-MCNC: 0.86 MG/DL (ref 0.52–1.04)
GFR SERPL CREATININE-BSD FRML MDRD: 66 ML/MIN/1.7M2
GLUCOSE BLDC GLUCOMTR-MCNC: 154 MG/DL (ref 70–99)
GLUCOSE BLDC GLUCOMTR-MCNC: 178 MG/DL (ref 70–99)
GLUCOSE BLDC GLUCOMTR-MCNC: 221 MG/DL (ref 70–99)
GLUCOSE BLDC GLUCOMTR-MCNC: 224 MG/DL (ref 70–99)
GLUCOSE SERPL-MCNC: 239 MG/DL (ref 70–99)
INR PPP: 2.13 (ref 0.86–1.14)
INTERPRETATION ECG - MUSE: NORMAL
POTASSIUM SERPL-SCNC: 3.8 MMOL/L (ref 3.4–5.3)
SODIUM SERPL-SCNC: 139 MMOL/L (ref 133–144)

## 2018-07-28 PROCEDURE — 25000128 H RX IP 250 OP 636: Performed by: INTERNAL MEDICINE

## 2018-07-28 PROCEDURE — 25000132 ZZH RX MED GY IP 250 OP 250 PS 637: Mod: GY | Performed by: PHYSICIAN ASSISTANT

## 2018-07-28 PROCEDURE — 93010 ELECTROCARDIOGRAM REPORT: CPT | Performed by: INTERNAL MEDICINE

## 2018-07-28 PROCEDURE — A9270 NON-COVERED ITEM OR SERVICE: HCPCS | Mod: GY | Performed by: PHYSICIAN ASSISTANT

## 2018-07-28 PROCEDURE — 00000146 ZZHCL STATISTIC GLUCOSE BY METER IP

## 2018-07-28 PROCEDURE — 93005 ELECTROCARDIOGRAM TRACING: CPT

## 2018-07-28 PROCEDURE — A9270 NON-COVERED ITEM OR SERVICE: HCPCS | Mod: GY | Performed by: INTERNAL MEDICINE

## 2018-07-28 PROCEDURE — 99239 HOSP IP/OBS DSCHRG MGMT >30: CPT | Performed by: INTERNAL MEDICINE

## 2018-07-28 PROCEDURE — 80048 BASIC METABOLIC PNL TOTAL CA: CPT | Performed by: INTERNAL MEDICINE

## 2018-07-28 PROCEDURE — 85610 PROTHROMBIN TIME: CPT | Performed by: INTERNAL MEDICINE

## 2018-07-28 PROCEDURE — 36415 COLL VENOUS BLD VENIPUNCTURE: CPT | Performed by: INTERNAL MEDICINE

## 2018-07-28 PROCEDURE — 25000132 ZZH RX MED GY IP 250 OP 250 PS 637: Mod: GY | Performed by: INTERNAL MEDICINE

## 2018-07-28 RX ORDER — WARFARIN SODIUM 5 MG/1
5 TABLET ORAL
Status: DISCONTINUED | OUTPATIENT
Start: 2018-07-28 | End: 2018-07-28 | Stop reason: HOSPADM

## 2018-07-28 RX ADMIN — INSULIN ASPART 2 UNITS: 100 INJECTION, SOLUTION INTRAVENOUS; SUBCUTANEOUS at 13:03

## 2018-07-28 RX ADMIN — DULOXETINE HYDROCHLORIDE 60 MG: 60 CAPSULE, DELAYED RELEASE ORAL at 09:38

## 2018-07-28 RX ADMIN — OMEPRAZOLE 20 MG: 20 CAPSULE, DELAYED RELEASE ORAL at 09:38

## 2018-07-28 RX ADMIN — SIMETHICONE CHEW TAB 80 MG 80 MG: 80 TABLET ORAL at 09:38

## 2018-07-28 RX ADMIN — METOPROLOL TARTRATE 75 MG: 50 TABLET ORAL at 09:42

## 2018-07-28 RX ADMIN — ASPIRIN 81 MG: 81 TABLET, COATED ORAL at 09:38

## 2018-07-28 RX ADMIN — HYDROCODONE BITARTRATE AND ACETAMINOPHEN 2 TABLET: 5; 325 TABLET ORAL at 02:12

## 2018-07-28 RX ADMIN — ENOXAPARIN SODIUM 30 MG: 30 INJECTION, SOLUTION INTRAVENOUS; SUBCUTANEOUS at 00:13

## 2018-07-28 RX ADMIN — FUROSEMIDE 20 MG: 20 TABLET ORAL at 09:38

## 2018-07-28 RX ADMIN — HYDROCODONE BITARTRATE AND ACETAMINOPHEN 2 TABLET: 5; 325 TABLET ORAL at 07:37

## 2018-07-28 RX ADMIN — INSULIN ASPART 1 UNITS: 100 INJECTION, SOLUTION INTRAVENOUS; SUBCUTANEOUS at 09:37

## 2018-07-28 RX ADMIN — CETIRIZINE HYDROCHLORIDE 10 MG: 10 TABLET, FILM COATED ORAL at 09:38

## 2018-07-28 RX ADMIN — PREGABALIN 150 MG: 75 CAPSULE ORAL at 09:39

## 2018-07-28 ASSESSMENT — ACTIVITIES OF DAILY LIVING (ADL)
ADLS_ACUITY_SCORE: 19

## 2018-07-28 NOTE — PLAN OF CARE
Problem: Patient Care Overview  Goal: Plan of Care/Patient Progress Review  Outcome: Adequate for Discharge Date Met: 07/28/18  Discharge    Patient discharged to home via personal vehicle with friend to transport  Care plan note  A&Ox4, VSS on RA. Denies pain. Up SBA/cane to BR. Voiding well. Mod CHO diet with good intake.  and 224, coverage per sliding scale insulin. Teaching reinforced on BG monitoring and diet, pt verbalizes understanding. All dc instructions reviewed with pt, verbalizes understanding. Meds and diabetic supplies sent with pt.    Listed belongings gathered and returned to patient. Yes  Care Plan and Patient education resolved: Yes  Prescriptions if needed, hard copies sent with patient  Yes  Home and hospital acquired medications returned to patient: NA  Medication Bin checked and emptied on discharge Yes  Follow up appointment made for patient: Pt has appt with [facility] Cardiology on 8/7/18. States will contact [facility] and schedule appt with Dr. Beck instead. Pt to schedule f/up with PCP within 7 days, and INR check with anticoagulation clinic for Monday 7/30/18.

## 2018-07-28 NOTE — PLAN OF CARE
Problem: Patient Care Overview  Goal: Plan of Care/Patient Progress Review  Outcome: Improving  A&Ox4. SBA with cane. Tele NSR. Cardiology following. Ordered repeat 12 lead r/t troponin at 1.2 and inverted t-wave. VSS, RA. Denies chest pain. Tolerating mod carb/2 gm Na diet. Blood glucose monitoring. dressing on Rt shin wound. New IV placed with ultrasound guidance. slaine locked. C/o lower back pain 8/10, given norco x1 and hot packs with decrease in pain.patient states she fears her neighbor and that he does not like her. She denies having paranoia and schizophrenia though it is included in her my chart history. Psych recommended Risperdal 1 mg daily. Pt agreeable to trial of this med. Possible discharge home tomorrow, pending clearance from Cardiology. RN will cont to monitor

## 2018-07-28 NOTE — PLAN OF CARE
Problem: Patient Care Overview  Goal: Plan of Care/Patient Progress Review  Outcome: Improving  A&Ox4, VSS on RA. Stand by assist with cane. 2 g Na and mod carb diet. 0200 . Pt c/o pain in lower back, norco given x1. Denies chest pain/SOB. Mepilex to R shin CDI. Pos dc today pending cardiology decision. Will continue to monitor.

## 2018-07-28 NOTE — PLAN OF CARE
Problem: Patient Care Overview  Goal: Plan of Care/Patient Progress Review  OT/CR: Pt discharged prior to scheduled OT/CR intervention.   Occupational Therapy and Cardiac Rehab Discharge Summary    Reason for therapy discharge:    Discharged to home with home therapy.    Progress towards therapy goal(s). See goals on Care Plan in [facility] electronic health record for goal details.  Goals not met.  Barriers to achieving goals:   limited tolerance for therapy and discharge from facility.    Therapy recommendation(s):    Continued therapy is recommended.  Rationale/Recommendations:  Pt would benefit from TCU stay; however, pt adamantly declined and decided to discharge home. Pt would benefit from home OT/PT upon discharge home to ensure safety with ADLs..

## 2018-07-28 NOTE — CONSULTS
"MD consult received for DM diet education    Visited with pt this afternoon    Diet history:  Brunch - black coffee, [place] granola biscuits, strawberry yogurt with \"sprinkle of granola\" or she might have a sandwich on 12 grain bread  Afternoon snack - likes to have fruit (banana, apple, orange, canned fruit cocktail in lite syrup)  Dinner - varies, \"I don't cook many big meals\", might be chicken, beef, hamburger, or sometimes makes a baked potato  Notes that she doesn't bake or buy sweets  Drinks coffee, regular soda, or grape juice    Is familiar with food labels    States that she is trying to \"wean\" off regular soda (refuses diet soda) and is not willing to switch to lite yogurt (\"tastes awful\")    Reviewed CHOs and which foods have CHOs.  Encouraged her to look at labels and keep her CHO intake to 60-90 gm per meal.  Recommended she avoid regular soda, limit juice to 4oz per day and have protein with each meal.    Pt does have the DM booklet.    Recommend continued counseling as an OP    Shandra Crabtree RD, LD  Clinical Dietitian - [facility]  Pager - [phone]  Weekend pager - [phone]    "

## 2018-07-29 ENCOUNTER — HOSPITAL ENCOUNTER (EMERGENCY)
Facility: CLINIC | Age: 63
Discharge: HOME | End: 2018-07-29
Attending: EMERGENCY MEDICINE | Admitting: EMERGENCY MEDICINE
Payer: MEDICARE

## 2018-07-29 VITALS
HEIGHT: 63 IN | DIASTOLIC BLOOD PRESSURE: 89 MMHG | RESPIRATION RATE: 18 BRPM | SYSTOLIC BLOOD PRESSURE: 136 MMHG | BODY MASS INDEX: 43.41 KG/M2 | OXYGEN SATURATION: 98 % | WEIGHT: 245 LBS | TEMPERATURE: 97.8 F

## 2018-07-29 DIAGNOSIS — Z91.148 NONCOMPLIANCE WITH MEDICATION REGIMEN: ICD-10-CM

## 2018-07-29 DIAGNOSIS — F20.0 PARANOID SCHIZOPHRENIA (H): ICD-10-CM

## 2018-07-29 PROCEDURE — 90791 PSYCH DIAGNOSTIC EVALUATION: CPT

## 2018-07-29 PROCEDURE — 99285 EMERGENCY DEPT VISIT HI MDM: CPT | Mod: 25

## 2018-07-29 ASSESSMENT — ENCOUNTER SYMPTOMS
HEADACHES: 1
TREMORS: 1

## 2018-07-29 NOTE — ED AVS SNAPSHOT
Emergency Department    [address] [facility] [address]    Phone:  [phone]    Fax:  [phone]                                       Samira Peralta   MRN: [id number]    Department:   Emergency Department   Date of Visit:  7/29/2018           Patient Information     Date Of Birth          1955        Your diagnoses for this visit were:     Paranoid schizophrenia (H)     Noncompliance with medication regimen        You were seen by Doron Danielson MD.      Follow-up Information     Schedule an appointment as soon as possible for a visit with [facility]    Contact information:    [address] [address]  [phone]          Follow up with  Emergency Department.    Specialty:  EMERGENCY MEDICINE    Why:  If symptoms worsen    Contact information:    [address] [facility] [id number] [phone]        Discharge Instructions         Paranoid Schizophrenia  You have been diagnosed with paranoid schizophrenia. Schizophrenia is a chronic, often disabling mental health disorder that makes functioning in work and society difficult. It is a type of psychosis, and involves perceiving reality differently from those around you. The difference between reality and what you think become blurred in your mind. Paranoid schizophrenia is a type of schizophrenia where paranoid symptoms become the focus of the psychotic symptoms.  The cause of schizophrenia is not yet known. It is believed to be a result of genetic and biological factors (brain chemistry and structure). Schizophrenia does run in families and occurs in about 1% of the adult population. Environmental factors may also have a role in schizophrenia. These may include where you grew up, toxins, and infections.  Symptoms include:    Hallucinations (seeing things or hearing voices that are not there).    Delusions (false beliefs), for example you may  think that others are trying to harm you or are plotting against you when they are not.    You may feel the need to protect yourself at most times.    Wanting to be alone and avoiding other people.    Severe anxiety, feeling angry and arguing a lot with others.  Medicines and therapy can help with many of the symptoms and allow for better daily function and quality of life. These medicines take 2 to 4 weeks to start working and 6 to 8 weeks to take full effect.  It is common to feel that you are not ill and that you don t need treatment. It is important to allow friends and family help you to continue to take your medicine.  Home care    On-going care and support helps people manage this disease.  Find a healthcare provider and therapist who meet your needs.  Seek help when you feel like you may be getting ill.    Be sure to take your medicine and get regular blood work to check your medicine levels and your overall health. Take the medicine and get the follow-up lab work as prescribed,  even if you think you don t need to do it.    Tell each of your healthcare providers about all of the prescription medicines, over-the-counter medicines, vitamins, and supplements you take. Certain supplements interact with medicines and can cause dangerous side effects. Ask your pharmacist when you have questions about medicine interactions.    Talk with your trusted friends and family about your feelings and thoughts. Ask them to help you recognize behavior changes early so you can get help and, if needed, medicines can be adjusted.    If your life is severely impacted by this illness, the Americans with Disabilities Act (ADA) may provide help. The ADA protects people with chronic physical and mental health problems. If you are having trouble managing workplace issues, or caring for yourself because of your schizophrenia it might be useful to contact your local ADA office to see if they can help. The U.S. Department  of Justice  operates a toll-free [employer] information line at: 976.924.1791 (Voice); or 863-673-9099 (TTY).  They can help you locate a local office.    Follow-up care  Follow up with your healthcare provider, or as advised.  Call 911  Call 911 if any of these occur:    You have suicidal thoughts, a suicide plan, and the means to carry out the plan    Trouble breathing    Very confused    Very drowsy or trouble awakening    Fainting or loss of consciousness    Rapid heart rate, very low heart rate, or a new irregular heart rate    Seizure  When to seek medical advice  Call your healthcare provider right away if you:     Feel like your symptoms are getting worse    Feel out of control or being controlled by others    Feel like you want to harm yourself or another    Are unable to care for yourself    Have family and friends who have expressed concern over your behavior  Date Last Reviewed: 9/29/2015 2000-2017 [facility]. [address] [place] [address]. All rights reserved. This information is not intended as a substitute for professional medical care. Always follow your healthcare professional's instructions.          24 Hour Appointment Hotline       To make an appointment at any [facility], call [phone] ([phone]). If you don't have a family doctor or clinic, we will help you find one. [facility] are conveniently located to serve the needs of you and your family.             Review of your medicines      Our records show that you are taking the medicines listed below. If these are incorrect, please call your family doctor or clinic.        Dose / Directions Last dose taken    alcohol swab prep pads   Quantity:  100 each        Use to swab area of injection/julio cesar as directed.   Refills:  3        ALPRAZolam 0.5 MG tablet   Commonly known as:  XANAX   Dose:  0.5 mg        Take 0.5 mg by mouth daily as needed   Refills:  0        atorvastatin 40 MG tablet   Commonly known as:   LIPITOR   Dose:  40 mg   Quantity:  90 tablet        Take 1 tablet (40 mg) by mouth every evening   Refills:  0        baclofen 20 MG tablet   Commonly known as:  LIORESAL   Dose:  20 mg        Take 20 mg by mouth 3 times daily.   Refills:  0        blood glucose calibration solution   Commonly known as:  NO BRAND SPECIFIED   Quantity:  1 Bottle        Use to calibrate blood glucose monitor as needed as directed.   Refills:  3        blood glucose monitoring meter device kit   Commonly known as:  no brand specified   Quantity:  1 kit        Use to test blood sugar once daily in the morning   Refills:  0        blood glucose monitoring test strip   Commonly known as:  no brand specified   Quantity:  100 strip        Use to test blood sugar once daily in the morning before breakfast   Refills:  6        cetirizine 10 MG tablet   Commonly known as:  zyrTEC   Dose:  10 mg        Take 10 mg by mouth daily   Refills:  0        CYMBALTA 30 MG EC capsule   Dose:  60 mg   Generic drug:  DULoxetine        Take 60 mg by mouth 2 times daily   Refills:  0        HYDROcodone-acetaminophen 7.5-325 MG per tablet   Commonly known as:  NORCO   Dose:  1 tablet        Take 1 tablet by mouth 3 times daily   Refills:  0        hydrocortisone 1 % Crea cream        Apply topically 2 times daily as needed for other (to face)   Refills:  0        LASIX PO   Dose:  20 mg        Take 20 mg by mouth daily   Refills:  0        LYRICA 100 MG capsule   Dose:  150 mg   Generic drug:  pregabalin        Take 150 mg by mouth 3 times daily   Refills:  0        metFORMIN 500 MG tablet   Commonly known as:  GLUCOPHAGE   Quantity:  100 tablet        Take 500mg twice daily for 1 week, then 500mg in AM and 1000mg in PM for 1 week, then 1000mg twice daily.   Refills:  0        Metoprolol Tartrate 75 MG Tabs   Dose:  75 mg   Quantity:  180 tablet        Take 75 mg by mouth 2 times daily   Refills:  0        multivitamin, therapeutic Tabs tablet   Dose:  1  tablet        Take 1 tablet by mouth daily   Refills:  0        omeprazole 20 MG tablet   Dose:  20 mg        Take 20 mg by mouth 2 times daily.   Refills:  0        polyethylene glycol Packet   Commonly known as:  MIRALAX/GLYCOLAX   Dose:  1 packet        Take 1 packet by mouth daily as needed   Refills:  0        potassium chloride SA 10 MEQ CR tablet   Commonly known as:  K-DUR/KLOR-CON M   Dose:  20 mEq        Take 20 mEq by mouth daily   Refills:  0        risperiDONE 1 MG tablet   Commonly known as:  risperDAL   Dose:  1 mg   Quantity:  60 tablet        Take 1 tablet (1 mg) by mouth At Bedtime   Refills:  0        SIMETHICONE-80 PO   Dose:  80 mg        Take 80 mg by mouth 2 times daily   Refills:  0        thin lancets   Commonly known as:  NO BRAND SPECIFIED   Quantity:  100 each        Use with lanceting device.   Refills:  0        tiZANidine 4 MG tablet   Commonly known as:  ZANAFLEX   Dose:  4 mg        Take 4 mg by mouth 2 times daily as needed   Refills:  0        TYLENOL PO   Dose:  500 mg        Take 500 mg by mouth 2 times daily as needed for mild pain or fever   Refills:  0        VENTOLIN IN   Dose:  1-2 puff        Inhale 1-2 puffs into the lungs every 6 hours as needed   Refills:  0        VOLTAREN 1 % Gel topical gel   Generic drug:  diclofenac        Apply topically daily as needed for moderate pain (Apply to back)   Refills:  0        WARFARIN SODIUM PO        Take by mouth daily M and Fri 2.5mg, all other days 5mg   Refills:  0                Orders Needing Specimen Collection     Ordered          07/29/18 0855  Drug abuse screen 77 urine (FL, RH, SH) - STAT, [facility]: STAT, Needs to be Collected     Scheduled Task Status   07/29/18 0856 Collect Drug abuse screen 77 urine (FL, RH, SH) Open   Order Class:  PCU Collect                07/29/18 0855  UA with Microscopic - STAT, Prio: STAT, Needs to be Collected     Scheduled Task Status   07/29/18 0856 Collect UA with Microscopic Open   Order Class:   PCU Collect                  Pending Results     No orders found from 7/27/2018 to 7/30/2018.            Pending Culture Results     No orders found from 7/27/2018 to 7/30/2018.            Pending Results Instructions     If you had any lab results that were not finalized at the time of your Discharge, you can call the ED Lab Result RN at [phone]. You will be contacted by this team for any positive Lab results or changes in treatment. The nurses are available 7 days a week from 10A to 6:30P.  You can leave a message 24 hours per day and they will return your call.        Test Results From Your Hospital Stay               Clinical Quality Measure: Blood Pressure Screening     Your blood pressure was checked while you were in the emergency department today. The last reading we obtained was  BP: 136/89 . Please read the guidelines below about what these numbers mean and what you should do about them.  If your systolic blood pressure (the top number) is less than 120 and your diastolic blood pressure (the bottom number) is less than 80, then your blood pressure is normal. There is nothing more that you need to do about it.  If your systolic blood pressure (the top number) is 120-139 or your diastolic blood pressure (the bottom number) is 80-89, your blood pressure may be higher than it should be. You should have your blood pressure rechecked within a year by a primary care provider.  If your systolic blood pressure (the top number) is 140 or greater or your diastolic blood pressure (the bottom number) is 90 or greater, you may have high blood pressure. High blood pressure is treatable, but if left untreated over time it can put you at risk for heart attack, stroke, or kidney failure. You should have your blood pressure rechecked by a primary care provider within the next 4 weeks.  If your provider in the emergency department today gave you specific instructions to follow-up with your doctor or provider even sooner  "than that, you should follow that instruction and not wait for up to 4 weeks for your follow-up visit.        Thank you for choosing [facility]       Thank you for choosing [facility] for your care. Our goal is always to provide you with excellent care. Hearing back from our patients is one way we can continue to improve our services. Please take a few minutes to complete the written survey that you may receive in the mail after you visit with us. Thank you!        onlinetoursharSailPlay Information     ClientShow lets you send messages to your doctor, view your test results, renew your prescriptions, schedule appointments and more. To sign up, go to [email or web address] . Click on \"Log in\" on the left side of the screen, which will take you to the Welcome page. Then click on \"Sign up Now\" on the right side of the page.     You will be asked to enter the access code listed below, as well as some personal information. Please follow the directions to create your username and password.     Your access code is: [email or web address]  Expires: 2018  1:16 PM     Your access code will  in 90 days. If you need help or a new code, please call your [facility] clinic or [phone].        Care EveryWhere ID     This is your Care EveryWhere ID. This could be used by other organizations to access your [facility] medical records  [id number]        Equal Access to Services     [place] : Hadii jaylon vazquez Sowilbert, waaxda luqadaha, qaybta kaalmada antonio, maulik marie. So [facility] [phone].    ATENCIÓN: Si habla español, tiene a mercedes disposición servicios gratuitos de asistencia lingüística. Nicola al [phone].    We comply with applicable federal civil rights laws and [place] laws. We do not discriminate on the basis of race, color, national origin, age, disability, sex, sexual orientation, or gender identity.            After Visit Summary       This is your record. Keep this with you and show to your " community pharmacist(s) and doctor(s) at your next visit.

## 2018-07-29 NOTE — ED AVS SNAPSHOT
Emergency Department    [address] [place] [address]    Phone:  [phone]    Fax:  [phone]                                       Samira Peralta   MRN: [id number]    Department:   Emergency Department   Date of Visit:  7/29/2018           After Visit Summary Signature Page     I have received my discharge instructions, and my questions have been answered. I have discussed any challenges I see with this plan with the nurse or doctor.    ..........................................................................................................................................  Patient/Patient Representative Signature      ..........................................................................................................................................  Patient Representative Print Name and Relationship to Patient    ..................................................               ................................................  Date                                            Time    ..........................................................................................................................................  Reviewed by Signature/Title    ...................................................              ..............................................  Date                                                            Time

## 2018-07-29 NOTE — DISCHARGE INSTRUCTIONS
Paranoid Schizophrenia  You have been diagnosed with paranoid schizophrenia. Schizophrenia is a chronic, often disabling mental health disorder that makes functioning in work and society difficult. It is a type of psychosis, and involves perceiving reality differently from those around you. The difference between reality and what you think become blurred in your mind. Paranoid schizophrenia is a type of schizophrenia where paranoid symptoms become the focus of the psychotic symptoms.  The cause of schizophrenia is not yet known. It is believed to be a result of genetic and biological factors (brain chemistry and structure). Schizophrenia does run in families and occurs in about 1% of the adult population. Environmental factors may also have a role in schizophrenia. These may include where you grew up, toxins, and infections.  Symptoms include:    Hallucinations (seeing things or hearing voices that are not there).    Delusions (false beliefs), for example you may think that others are trying to harm you or are plotting against you when they are not.    You may feel the need to protect yourself at most times.    Wanting to be alone and avoiding other people.    Severe anxiety, feeling angry and arguing a lot with others.  Medicines and therapy can help with many of the symptoms and allow for better daily function and quality of life. These medicines take 2 to 4 weeks to start working and 6 to 8 weeks to take full effect.  It is common to feel that you are not ill and that you don t need treatment. It is important to allow friends and family help you to continue to take your medicine.  Home care    On-going care and support helps people manage this disease.  Find a healthcare provider and therapist who meet your needs.  Seek help when you feel like you may be getting ill.    Be sure to take your medicine and get regular blood work to check your medicine levels and your overall health. Take the medicine and get the  follow-up lab work as prescribed,  even if you think you don t need to do it.    Tell each of your healthcare providers about all of the prescription medicines, over-the-counter medicines, vitamins, and supplements you take. Certain supplements interact with medicines and can cause dangerous side effects. Ask your pharmacist when you have questions about medicine interactions.    Talk with your trusted friends and family about your feelings and thoughts. Ask them to help you recognize behavior changes early so you can get help and, if needed, medicines can be adjusted.    If your life is severely impacted by this illness, the Americans with Disabilities Act (ADA) may provide help. The ADA protects people with chronic physical and mental health problems. If you are having trouble managing workplace issues, or caring for yourself because of your schizophrenia it might be useful to contact your local ADA office to see if they can help. The U.S. Department  of Justice operates a toll-free ADA information line at: 514.385.8331 (Voice); or 416-430-2490 (TTY).  They can help you locate a local office.    Follow-up care  Follow up with your healthcare provider, or as advised.  Call 911  Call 911 if any of these occur:    You have suicidal thoughts, a suicide plan, and the means to carry out the plan    Trouble breathing    Very confused    Very drowsy or trouble awakening    Fainting or loss of consciousness    Rapid heart rate, very low heart rate, or a new irregular heart rate    Seizure  When to seek medical advice  Call your healthcare provider right away if you:     Feel like your symptoms are getting worse    Feel out of control or being controlled by others    Feel like you want to harm yourself or another    Are unable to care for yourself    Have family and friends who have expressed concern over your behavior  Date Last Reviewed: 9/29/2015 2000-2017 [employer]. [address] [place] [address], [place]  [address]. All rights reserved. This information is not intended as a substitute for professional medical care. Always follow your healthcare professional's instructions.

## 2018-07-29 NOTE — ED NOTES
Bed: ED16  Expected date: 7/29/18  Expected time: 8:11 AM  Means of arrival: Ambulance  Comments:  640 85F psych ETA 7614

## 2018-07-29 NOTE — ED PROVIDER NOTES
History     Chief Complaint:  Psychiatric Evaluation    HPI   Samira Peralta is a 63 year old female with a history of anxiety, depression, hypertension and ACS, who presents via EMS for a psychiatric evaluation. The patient reports that she returned home from the hospital yesterday feeling wobbly and shaking, and noted that her downstairs neighbor was being visited by her father. She states she heard the father argue with his daughter and plot to go up to the patient's room to confront her. She notes she was concerned he would harm her and potentially naima her, and so she went downstairs to the mailroom and hid where she felt she was safe. She reports that she was found this morning, and EMS was called to transport her to the ED for a psychiatric evaluation. She reports she currently has a headache, and notes that she did not take her Risperdal last night. She denies having any suicidal ideation. Of note, the patient states she has no appointments with her psychiatrist scheduled in the near future.    Allergies:  ACE inhibitors  Morphine sulfate    Medications:    Albuterol  Xanax  Lipitor  Lioresal  Zyrtec  Voltaren  Cymbalta  Lasix   Metformin  Metoprolol tartrate  Omeprazole  Miralax  Potassium chloride  Lyrica  Risperdal  Simethicone  Zanaflex  Warfarin    Past Medical History:    Acute coronary syndrome  Anxiety  Depression  Brachial plexitis  External ear canal stenosis  Hearing loss  Heart burn  Hypertension  Perioral dermatitis  Rosacea   Seasonal allergies  Sleep apnea    Past Surgical History:    Breast reduction  Hysterectomy  Left knee tear orthopedic surgery    Family History:    History reviewed. No pertinent family history.     Social History:  Marital Status:   [4]  Smoking status: Never smoker  Alcohol use: Yes    Review of Systems   Neurological: Positive for tremors (shaky/wobbly) and headaches.   Psychiatric/Behavioral: Negative for suicidal ideas.   All other systems reviewed  "and are negative.    Physical Exam     Patient Vitals for the past 24 hrs:   BP Temp Temp src Heart Rate Resp SpO2 Height Weight   07/29/18 0828 136/89 97.8  F (36.6  C) Temporal 109 18 98 % 1.6 m (5' 3\") 111.1 kg (245 lb)     Physical Exam  Nursing note and vitals reviewed.  Constitutional:  Oriented to person, place, and time. Cooperative.   HENT:   Nose:    Nose normal.   Mouth/Throat:   Mucous membranes are normal.   Eyes:    Conjunctivae normal and EOM are normal.      Pupils are equal, round, and reactive to light.   Neck:    Trachea normal.   Cardiovascular:  Normal rate, regular rhythm, normal heart sounds and normal pulses. No murmur heard.  Pulmonary/Chest:  Effort normal and breath sounds normal.   Abdominal:   Soft. Normal appearance and bowel sounds are normal.      There is no tenderness.      There is no rebound and no CVA tenderness.   Musculoskeletal:  Extremities atraumatic x 4.   Lymphadenopathy:  No cervical adenopathy.   Neurological:   Alert and oriented to person, place, and time. Normal strength.      No cranial nerve deficit or sensory deficit. GCS eye subscore is 4. GCS verbal subscore is 5. GCS motor subscore is 6.   Skin:    Skin is intact. No rash noted.   Psychiatric:   Disorganized thinking present with paranoia, but no suicidal ideation.    Emergency Department Course   Emergency Department Course:  The patient arrived in the emergency department via EMS.    Past medical records, nursing notes, and vitals reviewed.  0847: I performed an exam of the patient and obtained history, as documented above.    0958: I spoke with DEC regarding the patient.    1021: I rechecked the patient. Explained findings to the patient.    Findings and plan explained to the patient. Patient discharged home with instructions regarding supportive care, medications, and reasons to return. The importance of close follow-up was reviewed.     Impression & Plan    Medical Decision Making:  This is a 63-year-old " female who was just discharged yesterday from the hospital for some medical issues.  She has a history of schizophrenia though as well, and she appears quite paranoid here.  However she does not appear to be a danger to herself or others.  I had LAMONT, our DEC , see the patient as well.  He is in agreement that she is paranoid.  However she is not holdable, and she is not agreeable to taking psychiatric medications or following up with a psychiatrist.  Of course this was recommended to her, but again we cannot force her to take medications, follow-up with a psychiatrist, or place her on a hold, as again she has not a danger to herself or others.  Therefore at this point she was discharged.  She was instructed however to follow-up with her primary physician as soon as possible and certainly return with any concerns or worsening symptoms.      Diagnosis:    ICD-10-CM   1. Paranoid schizophrenia (H) F20.0   2. Noncompliance with medication regimen Z91.14       Disposition:  discharged to home      Merlyn Hill  7/29/2018    EMERGENCY DEPARTMENT  I, Merlyn Hill, am serving as a scribe at 8:47 AM on 7/29/2018 to document services personally performed by Doron Danielson MD based on my observations and the provider's statements to me.        Doron Danielson MD  07/29/18 8621

## 2018-07-30 ENCOUNTER — CARE COORDINATION (OUTPATIENT)
Dept: CARDIOLOGY | Facility: CLINIC | Age: 63
End: 2018-07-30

## 2018-07-30 DIAGNOSIS — Z09 HOSPITAL DISCHARGE FOLLOW-UP: Primary | ICD-10-CM

## 2018-07-30 NOTE — PROGRESS NOTES
Patient was evaluated by cardiology while inpatient for 2 days of chest pain. Called patient to discuss any post hospital d/c questions she may have, review medication changes, and confirm f/u appts. Patient denied any questions regarding new medications or changes with their PTA medications. Patient denied any SOB, chest pain, or light headedness. Left groin cardiac cath site is without bleeding, swelling, redness or tenderness. RN confirmed with patient that she will need to schedule an apt. Patient advised to call clinic with any cardiac related questions or concerns prior to this farhat't. Patient verbalized understanding and agreed with plan. Pt connected to scheduling to schedule the follow up apt.

## 2018-08-01 LAB — INTERPRETATION ECG - MUSE: NORMAL

## 2019-01-01 ENCOUNTER — HOSPITAL ENCOUNTER (EMERGENCY)
Facility: CLINIC | Age: 64
Discharge: PSYCHIATRIC OR SUBSTANCE USE FACILITY | End: 2019-07-02
Attending: EMERGENCY MEDICINE | Admitting: EMERGENCY MEDICINE
Payer: MEDICARE

## 2019-01-01 ENCOUNTER — ANESTHESIA (OUTPATIENT)
Dept: GASTROENTEROLOGY | Facility: CLINIC | Age: 64
End: 2019-01-01
Payer: MEDICARE

## 2019-01-01 ENCOUNTER — HOSPITAL ENCOUNTER (EMERGENCY)
Facility: CLINIC | Age: 64
Discharge: PSYCHIATRIC OR SUBSTANCE USE FACILITY | End: 2019-06-24
Attending: EMERGENCY MEDICINE | Admitting: EMERGENCY MEDICINE
Payer: MEDICARE

## 2019-01-01 ENCOUNTER — HOSPITAL ENCOUNTER (INPATIENT)
Facility: CLINIC | Age: 64
LOS: 1 days | Discharge: HOME | DRG: 885 | End: 2019-07-03
Attending: PSYCHIATRY & NEUROLOGY | Admitting: PSYCHIATRY & NEUROLOGY
Payer: MEDICARE

## 2019-01-01 ENCOUNTER — PRE VISIT (OUTPATIENT)
Dept: CARDIOLOGY | Facility: CLINIC | Age: 64
End: 2019-01-01

## 2019-01-01 ENCOUNTER — HOSPITAL ENCOUNTER (INPATIENT)
Facility: CLINIC | Age: 64
LOS: 3 days | Discharge: HOME | DRG: 885 | End: 2019-06-27
Attending: PSYCHIATRY & NEUROLOGY | Admitting: PSYCHIATRY & NEUROLOGY
Payer: MEDICARE

## 2019-01-01 ENCOUNTER — HOSPITAL ENCOUNTER (OUTPATIENT)
Facility: CLINIC | Age: 64
Discharge: HOME | End: 2019-01-28
Attending: INTERNAL MEDICINE | Admitting: INTERNAL MEDICINE
Payer: MEDICARE

## 2019-01-01 ENCOUNTER — TELEPHONE (OUTPATIENT)
Dept: BEHAVIORAL HEALTH | Facility: CLINIC | Age: 64
End: 2019-01-01

## 2019-01-01 ENCOUNTER — OFFICE VISIT (OUTPATIENT)
Dept: CARDIOLOGY | Facility: CLINIC | Age: 64
End: 2019-01-01
Attending: INTERNAL MEDICINE
Payer: MEDICARE

## 2019-01-01 ENCOUNTER — ANESTHESIA EVENT (OUTPATIENT)
Dept: GASTROENTEROLOGY | Facility: CLINIC | Age: 64
End: 2019-01-01
Payer: MEDICARE

## 2019-01-01 ENCOUNTER — TRANSFERRED RECORDS (OUTPATIENT)
Dept: HEALTH INFORMATION MANAGEMENT | Facility: CLINIC | Age: 64
End: 2019-01-01

## 2019-01-01 VITALS
RESPIRATION RATE: 20 BRPM | DIASTOLIC BLOOD PRESSURE: 83 MMHG | OXYGEN SATURATION: 99 % | TEMPERATURE: 98.4 F | HEART RATE: 92 BPM | SYSTOLIC BLOOD PRESSURE: 124 MMHG | BODY MASS INDEX: 42.69 KG/M2 | WEIGHT: 241 LBS

## 2019-01-01 VITALS
DIASTOLIC BLOOD PRESSURE: 57 MMHG | BODY MASS INDEX: 42.22 KG/M2 | SYSTOLIC BLOOD PRESSURE: 107 MMHG | WEIGHT: 238.3 LBS | RESPIRATION RATE: 17 BRPM | HEIGHT: 63 IN | OXYGEN SATURATION: 94 % | TEMPERATURE: 98 F | HEART RATE: 63 BPM

## 2019-01-01 VITALS
HEART RATE: 97 BPM | BODY MASS INDEX: 42.18 KG/M2 | TEMPERATURE: 99.8 F | WEIGHT: 240 LBS | OXYGEN SATURATION: 98 % | SYSTOLIC BLOOD PRESSURE: 175 MMHG | RESPIRATION RATE: 18 BRPM | DIASTOLIC BLOOD PRESSURE: 83 MMHG

## 2019-01-01 VITALS
SYSTOLIC BLOOD PRESSURE: 136 MMHG | HEIGHT: 63 IN | OXYGEN SATURATION: 97 % | DIASTOLIC BLOOD PRESSURE: 80 MMHG | WEIGHT: 241.7 LBS | BODY MASS INDEX: 42.82 KG/M2 | HEART RATE: 66 BPM

## 2019-01-01 VITALS
SYSTOLIC BLOOD PRESSURE: 110 MMHG | BODY MASS INDEX: 42.17 KG/M2 | OXYGEN SATURATION: 95 % | HEART RATE: 61 BPM | RESPIRATION RATE: 15 BRPM | DIASTOLIC BLOOD PRESSURE: 53 MMHG | WEIGHT: 238 LBS | HEIGHT: 63 IN

## 2019-01-01 VITALS
OXYGEN SATURATION: 95 % | SYSTOLIC BLOOD PRESSURE: 112 MMHG | BODY MASS INDEX: 41.75 KG/M2 | HEIGHT: 63 IN | RESPIRATION RATE: 16 BRPM | DIASTOLIC BLOOD PRESSURE: 76 MMHG | HEART RATE: 74 BPM | TEMPERATURE: 97 F | WEIGHT: 235.6 LBS

## 2019-01-01 DIAGNOSIS — Z09 HOSPITAL DISCHARGE FOLLOW-UP: ICD-10-CM

## 2019-01-01 DIAGNOSIS — R44.0 AUDITORY HALLUCINATION: ICD-10-CM

## 2019-01-01 DIAGNOSIS — F20.0 PARANOID SCHIZOPHRENIA (H): Primary | ICD-10-CM

## 2019-01-01 DIAGNOSIS — F22 DELUSION (H): Primary | ICD-10-CM

## 2019-01-01 DIAGNOSIS — F20.0 PARANOID SCHIZOPHRENIA (H): ICD-10-CM

## 2019-01-01 DIAGNOSIS — I25.10 CORONARY ARTERY DISEASE INVOLVING NATIVE CORONARY ARTERY OF NATIVE HEART WITHOUT ANGINA PECTORIS: ICD-10-CM

## 2019-01-01 DIAGNOSIS — I48.91 ATRIAL FIBRILLATION, UNSPECIFIED TYPE (H): Primary | ICD-10-CM

## 2019-01-01 DIAGNOSIS — F22 DELUSION (H): ICD-10-CM

## 2019-01-01 LAB
ALBUMIN SERPL-MCNC: 3.5 G/DL (ref 3.4–5)
ALBUMIN UR-MCNC: NEGATIVE MG/DL
ALP SERPL-CCNC: 65 U/L (ref 40–150)
ALT SERPL W P-5'-P-CCNC: 35 U/L (ref 0–50)
AMPHETAMINES UR QL SCN: NEGATIVE
AMPHETAMINES UR QL SCN: NEGATIVE
ANION GAP SERPL CALCULATED.3IONS-SCNC: 10 MMOL/L
ANION GAP SERPL CALCULATED.3IONS-SCNC: 4 MMOL/L (ref 3–14)
ANION GAP SERPL CALCULATED.3IONS-SCNC: 8 MMOL/L (ref 3–14)
APPEARANCE UR: CLEAR
AST SERPL W P-5'-P-CCNC: 45 U/L (ref 0–45)
BACTERIA #/AREA URNS HPF: ABNORMAL /HPF
BARBITURATES UR QL: NEGATIVE
BARBITURATES UR QL: NEGATIVE
BASOPHILS # BLD AUTO: 0 10E9/L (ref 0–0.2)
BASOPHILS # BLD AUTO: 0 10E9/L (ref 0–0.2)
BASOPHILS NFR BLD AUTO: 0.2 %
BASOPHILS NFR BLD AUTO: 0.4 %
BENZODIAZ UR QL: NEGATIVE
BENZODIAZ UR QL: NEGATIVE
BILIRUB SERPL-MCNC: 0.5 MG/DL (ref 0.2–1.3)
BILIRUB UR QL STRIP: NEGATIVE
BUN SERPL-MCNC: 13 MG/DL (ref 7–30)
BUN SERPL-MCNC: 17 MG/DL (ref 7–30)
BUN SERPL-MCNC: ABNORMAL MG/DL
CALCIUM SERPL-MCNC: 8.6 MG/DL (ref 8.5–10.1)
CALCIUM SERPL-MCNC: 9.3 MG/DL (ref 8.5–10.1)
CALCIUM SERPL-MCNC: 9.8 MG/DL
CANNABINOIDS UR QL SCN: NEGATIVE
CANNABINOIDS UR QL SCN: NEGATIVE
CHLORIDE SERPL-SCNC: 103 MMOL/L (ref 94–109)
CHLORIDE SERPL-SCNC: 105 MMOL/L (ref 94–109)
CHLORIDE SERPLBLD-SCNC: 101 MMOL/L
CHOLEST SERPL-MCNC: 156 MG/DL
CO2 SERPL-SCNC: 28 MMOL/L
CO2 SERPL-SCNC: 30 MMOL/L (ref 20–32)
CO2 SERPL-SCNC: 32 MMOL/L (ref 20–32)
COCAINE UR QL: NEGATIVE
COCAINE UR QL: NEGATIVE
COLONOSCOPY: NORMAL
COLOR UR AUTO: ABNORMAL
COPATH REPORT: NORMAL
CREAT SERPL-MCNC: 0.76 MG/DL (ref 0.52–1.04)
CREAT SERPL-MCNC: 0.81 MG/DL (ref 0.52–1.04)
CREAT SERPL-MCNC: 1.04 MG/DL
DEPRECATED CALCIDIOL+CALCIFEROL SERPL-MC: 35 UG/L (ref 20–75)
DIFFERENTIAL METHOD BLD: NORMAL
DIFFERENTIAL METHOD BLD: NORMAL
EOSINOPHIL # BLD AUTO: 0.1 10E9/L (ref 0–0.7)
EOSINOPHIL # BLD AUTO: 0.1 10E9/L (ref 0–0.7)
EOSINOPHIL NFR BLD AUTO: 2.2 %
EOSINOPHIL NFR BLD AUTO: 3.3 %
ERYTHROCYTE [DISTWIDTH] IN BLOOD BY AUTOMATED COUNT: 14.4 % (ref 10–15)
ERYTHROCYTE [DISTWIDTH] IN BLOOD BY AUTOMATED COUNT: 14.7 % (ref 10–15)
FOLATE SERPL-MCNC: 26.2 NG/ML
GFR SERPL CREATININE-BSD FRML MDRD: 53 ML/MIN/1.73M2
GFR SERPL CREATININE-BSD FRML MDRD: 76 ML/MIN/{1.73_M2}
GFR SERPL CREATININE-BSD FRML MDRD: 81 ML/MIN/{1.73_M2}
GLUCOSE BLDC GLUCOMTR-MCNC: 117 MG/DL (ref 70–99)
GLUCOSE BLDC GLUCOMTR-MCNC: 132 MG/DL (ref 70–99)
GLUCOSE BLDC GLUCOMTR-MCNC: 132 MG/DL (ref 70–99)
GLUCOSE BLDC GLUCOMTR-MCNC: 142 MG/DL (ref 70–99)
GLUCOSE BLDC GLUCOMTR-MCNC: 142 MG/DL (ref 70–99)
GLUCOSE BLDC GLUCOMTR-MCNC: 146 MG/DL (ref 70–99)
GLUCOSE BLDC GLUCOMTR-MCNC: 146 MG/DL (ref 70–99)
GLUCOSE BLDC GLUCOMTR-MCNC: 149 MG/DL (ref 70–99)
GLUCOSE BLDC GLUCOMTR-MCNC: 150 MG/DL (ref 70–99)
GLUCOSE BLDC GLUCOMTR-MCNC: 153 MG/DL (ref 70–99)
GLUCOSE BLDC GLUCOMTR-MCNC: 154 MG/DL (ref 70–99)
GLUCOSE BLDC GLUCOMTR-MCNC: 155 MG/DL (ref 70–99)
GLUCOSE BLDC GLUCOMTR-MCNC: 157 MG/DL (ref 70–99)
GLUCOSE BLDC GLUCOMTR-MCNC: 164 MG/DL (ref 70–99)
GLUCOSE BLDC GLUCOMTR-MCNC: 168 MG/DL (ref 70–99)
GLUCOSE BLDC GLUCOMTR-MCNC: 168 MG/DL (ref 70–99)
GLUCOSE BLDC GLUCOMTR-MCNC: 173 MG/DL (ref 70–99)
GLUCOSE BLDC GLUCOMTR-MCNC: 189 MG/DL (ref 70–99)
GLUCOSE SERPL-MCNC: 152 MG/DL (ref 65–100)
GLUCOSE SERPL-MCNC: 162 MG/DL (ref 70–99)
GLUCOSE SERPL-MCNC: 162 MG/DL (ref 70–99)
GLUCOSE UR STRIP-MCNC: 50 MG/DL
HBA1C MFR BLD: 7.4 % (ref 0–5.6)
HCT VFR BLD AUTO: 38.5 % (ref 35–47)
HCT VFR BLD AUTO: 39.9 % (ref 35–47)
HDLC SERPL-MCNC: 35 MG/DL
HGB BLD-MCNC: 12.2 G/DL (ref 11.7–15.7)
HGB BLD-MCNC: 12.8 G/DL (ref 11.7–15.7)
HGB UR QL STRIP: NEGATIVE
IMM GRANULOCYTES # BLD: 0 10E9/L (ref 0–0.4)
IMM GRANULOCYTES # BLD: 0 10E9/L (ref 0–0.4)
IMM GRANULOCYTES NFR BLD: 0 %
IMM GRANULOCYTES NFR BLD: 0.2 %
INR PPP: 1.86 (ref 0.86–1.14)
INR PPP: 2.2 (ref 0.86–1.14)
INR PPP: 2.44 (ref 0.86–1.14)
INR PPP: 2.58 (ref 0.86–1.14)
INR PPP: 2.65 (ref 0.86–1.14)
INR PPP: 2.9 (ref 0.86–1.14)
KETONES UR STRIP-MCNC: NEGATIVE MG/DL
LDLC SERPL CALC-MCNC: 59 MG/DL
LEUKOCYTE ESTERASE UR QL STRIP: NEGATIVE
LYMPHOCYTES # BLD AUTO: 1.7 10E9/L (ref 0.8–5.3)
LYMPHOCYTES # BLD AUTO: 1.8 10E9/L (ref 0.8–5.3)
LYMPHOCYTES NFR BLD AUTO: 38.4 %
LYMPHOCYTES NFR BLD AUTO: 42.4 %
MCH RBC QN AUTO: 29.2 PG (ref 26.5–33)
MCH RBC QN AUTO: 29.6 PG (ref 26.5–33)
MCHC RBC AUTO-ENTMCNC: 31.7 G/DL (ref 31.5–36.5)
MCHC RBC AUTO-ENTMCNC: 32.1 G/DL (ref 31.5–36.5)
MCV RBC AUTO: 92 FL (ref 78–100)
MCV RBC AUTO: 92 FL (ref 78–100)
MONOCYTES # BLD AUTO: 0.3 10E9/L (ref 0–1.3)
MONOCYTES # BLD AUTO: 0.5 10E9/L (ref 0–1.3)
MONOCYTES NFR BLD AUTO: 10.3 %
MONOCYTES NFR BLD AUTO: 6.9 %
MUCOUS THREADS #/AREA URNS LPF: PRESENT /LPF
NEUTROPHILS # BLD AUTO: 2 10E9/L (ref 1.6–8.3)
NEUTROPHILS # BLD AUTO: 2.2 10E9/L (ref 1.6–8.3)
NEUTROPHILS NFR BLD AUTO: 47.2 %
NEUTROPHILS NFR BLD AUTO: 48.5 %
NITRATE UR QL: NEGATIVE
NONHDLC SERPL-MCNC: 121 MG/DL
NRBC # BLD AUTO: 0 10*3/UL
NRBC # BLD AUTO: 0 10*3/UL
NRBC BLD AUTO-RTO: 0 /100
NRBC BLD AUTO-RTO: 0 /100
OPIATES UR QL SCN: POSITIVE
OPIATES UR QL SCN: POSITIVE
PCP UR QL SCN: NEGATIVE
PCP UR QL SCN: NEGATIVE
PH UR STRIP: 6 PH (ref 5–7)
PLATELET # BLD AUTO: 197 10E9/L (ref 150–450)
PLATELET # BLD AUTO: 218 10E9/L (ref 150–450)
POTASSIUM SERPL-SCNC: 3.9 MMOL/L (ref 3.4–5.3)
POTASSIUM SERPL-SCNC: 4.1 MMOL/L
POTASSIUM SERPL-SCNC: 4.1 MMOL/L (ref 3.4–5.3)
PROT SERPL-MCNC: 7.3 G/DL (ref 6.8–8.8)
RBC # BLD AUTO: 4.18 10E12/L (ref 3.8–5.2)
RBC # BLD AUTO: 4.33 10E12/L (ref 3.8–5.2)
RBC #/AREA URNS AUTO: 0 /HPF (ref 0–2)
SODIUM SERPL-SCNC: 139 MMOL/L
SODIUM SERPL-SCNC: 141 MMOL/L (ref 133–144)
SODIUM SERPL-SCNC: 141 MMOL/L (ref 133–144)
SOURCE: ABNORMAL
SP GR UR STRIP: 1.01 (ref 1–1.03)
SQUAMOUS #/AREA URNS AUTO: <1 /HPF (ref 0–1)
TRIGL SERPL-MCNC: 311 MG/DL
TSH SERPL DL<=0.005 MIU/L-ACNC: 1.28 MU/L (ref 0.4–4)
TSH SERPL DL<=0.005 MIU/L-ACNC: 1.73 MU/L (ref 0.4–4)
UPPER GI ENDOSCOPY: NORMAL
UROBILINOGEN UR STRIP-MCNC: NORMAL MG/DL (ref 0–2)
VIT B12 SERPL-MCNC: 257 PG/ML (ref 193–986)
WBC # BLD AUTO: 4.2 10E9/L (ref 4–11)
WBC # BLD AUTO: 4.5 10E9/L (ref 4–11)
WBC #/AREA URNS AUTO: 0 /HPF (ref 0–5)

## 2019-01-01 PROCEDURE — 25000128 H RX IP 250 OP 636: Performed by: NURSE ANESTHETIST, CERTIFIED REGISTERED

## 2019-01-01 PROCEDURE — 12400000 ZZH R&B MH

## 2019-01-01 PROCEDURE — 25000132 ZZH RX MED GY IP 250 OP 250 PS 637: Mod: GY | Performed by: PSYCHIATRY & NEUROLOGY

## 2019-01-01 PROCEDURE — 00000146 ZZHCL STATISTIC GLUCOSE BY METER IP

## 2019-01-01 PROCEDURE — 25000132 ZZH RX MED GY IP 250 OP 250 PS 637: Mod: GY | Performed by: PHYSICIAN ASSISTANT

## 2019-01-01 PROCEDURE — 85025 COMPLETE CBC W/AUTO DIFF WBC: CPT | Performed by: NURSE PRACTITIONER

## 2019-01-01 PROCEDURE — 37000008 ZZH ANESTHESIA TECHNICAL FEE, 1ST 30 MIN: Performed by: INTERNAL MEDICINE

## 2019-01-01 PROCEDURE — 88342 IMHCHEM/IMCYTCHM 1ST ANTB: CPT | Performed by: INTERNAL MEDICINE

## 2019-01-01 PROCEDURE — 40000010 ZZH STATISTIC ANES STAT CODE-CRNA PER MINUTE: Performed by: INTERNAL MEDICINE

## 2019-01-01 PROCEDURE — 36415 COLL VENOUS BLD VENIPUNCTURE: CPT | Performed by: EMERGENCY MEDICINE

## 2019-01-01 PROCEDURE — 25000132 ZZH RX MED GY IP 250 OP 250 PS 637: Performed by: PSYCHIATRY & NEUROLOGY

## 2019-01-01 PROCEDURE — 85610 PROTHROMBIN TIME: CPT | Performed by: PSYCHIATRY & NEUROLOGY

## 2019-01-01 PROCEDURE — 99285 EMERGENCY DEPT VISIT HI MDM: CPT | Mod: 25

## 2019-01-01 PROCEDURE — 36415 COLL VENOUS BLD VENIPUNCTURE: CPT | Performed by: PSYCHIATRY & NEUROLOGY

## 2019-01-01 PROCEDURE — 45378 DIAGNOSTIC COLONOSCOPY: CPT | Performed by: INTERNAL MEDICINE

## 2019-01-01 PROCEDURE — 80307 DRUG TEST PRSMV CHEM ANLYZR: CPT | Performed by: EMERGENCY MEDICINE

## 2019-01-01 PROCEDURE — 99214 OFFICE O/P EST MOD 30 MIN: CPT | Performed by: INTERNAL MEDICINE

## 2019-01-01 PROCEDURE — 80053 COMPREHEN METABOLIC PANEL: CPT | Performed by: NURSE PRACTITIONER

## 2019-01-01 PROCEDURE — 43239 EGD BIOPSY SINGLE/MULTIPLE: CPT | Performed by: INTERNAL MEDICINE

## 2019-01-01 PROCEDURE — 25000131 ZZH RX MED GY IP 250 OP 636 PS 637: Mod: GY | Performed by: PHYSICIAN ASSISTANT

## 2019-01-01 PROCEDURE — 88305 TISSUE EXAM BY PATHOLOGIST: CPT | Mod: 26 | Performed by: INTERNAL MEDICINE

## 2019-01-01 PROCEDURE — 82962 GLUCOSE BLOOD TEST: CPT

## 2019-01-01 PROCEDURE — 83036 HEMOGLOBIN GLYCOSYLATED A1C: CPT | Performed by: PSYCHIATRY & NEUROLOGY

## 2019-01-01 PROCEDURE — 90853 GROUP PSYCHOTHERAPY: CPT

## 2019-01-01 PROCEDURE — 82607 VITAMIN B-12: CPT | Performed by: NURSE PRACTITIONER

## 2019-01-01 PROCEDURE — 80048 BASIC METABOLIC PNL TOTAL CA: CPT | Performed by: EMERGENCY MEDICINE

## 2019-01-01 PROCEDURE — 82746 ASSAY OF FOLIC ACID SERUM: CPT | Performed by: NURSE PRACTITIONER

## 2019-01-01 PROCEDURE — 99238 HOSP IP/OBS DSCHRG MGMT 30/<: CPT | Performed by: PSYCHIATRY & NEUROLOGY

## 2019-01-01 PROCEDURE — 80061 LIPID PANEL: CPT | Performed by: NURSE PRACTITIONER

## 2019-01-01 PROCEDURE — G0177 OPPS/PHP; TRAIN & EDUC SERV: HCPCS

## 2019-01-01 PROCEDURE — 85610 PROTHROMBIN TIME: CPT | Performed by: NURSE PRACTITIONER

## 2019-01-01 PROCEDURE — 99223 1ST HOSP IP/OBS HIGH 75: CPT | Performed by: PHYSICIAN ASSISTANT

## 2019-01-01 PROCEDURE — 12400001 ZZH R&B MH UMMC

## 2019-01-01 PROCEDURE — 99207 ZZC NON-BILLABLE SERV PER CHARTING: CPT | Performed by: NURSE PRACTITIONER

## 2019-01-01 PROCEDURE — 82306 VITAMIN D 25 HYDROXY: CPT | Performed by: NURSE PRACTITIONER

## 2019-01-01 PROCEDURE — 25000132 ZZH RX MED GY IP 250 OP 250 PS 637: Mod: GY | Performed by: EMERGENCY MEDICINE

## 2019-01-01 PROCEDURE — 84443 ASSAY THYROID STIM HORMONE: CPT | Performed by: PSYCHIATRY & NEUROLOGY

## 2019-01-01 PROCEDURE — 25000125 ZZHC RX 250: Performed by: NURSE ANESTHETIST, CERTIFIED REGISTERED

## 2019-01-01 PROCEDURE — 90791 PSYCH DIAGNOSTIC EVALUATION: CPT

## 2019-01-01 PROCEDURE — 85610 PROTHROMBIN TIME: CPT | Performed by: EMERGENCY MEDICINE

## 2019-01-01 PROCEDURE — 99207 ZZC CONSULT E&M CHANGED TO INITIAL LEVEL: CPT | Performed by: PHYSICIAN ASSISTANT

## 2019-01-01 PROCEDURE — 84443 ASSAY THYROID STIM HORMONE: CPT | Performed by: NURSE PRACTITIONER

## 2019-01-01 PROCEDURE — 88342 IMHCHEM/IMCYTCHM 1ST ANTB: CPT | Mod: 26 | Performed by: INTERNAL MEDICINE

## 2019-01-01 PROCEDURE — 25000132 ZZH RX MED GY IP 250 OP 250 PS 637: Mod: GY | Performed by: NURSE PRACTITIONER

## 2019-01-01 PROCEDURE — 88305 TISSUE EXAM BY PATHOLOGIST: CPT | Performed by: INTERNAL MEDICINE

## 2019-01-01 PROCEDURE — 99222 1ST HOSP IP/OBS MODERATE 55: CPT | Mod: AI | Performed by: PSYCHIATRY & NEUROLOGY

## 2019-01-01 PROCEDURE — 99221 1ST HOSP IP/OBS SF/LOW 40: CPT | Performed by: PHYSICIAN ASSISTANT

## 2019-01-01 PROCEDURE — 37000009 ZZH ANESTHESIA TECHNICAL FEE, EACH ADDTL 15 MIN: Performed by: INTERNAL MEDICINE

## 2019-01-01 PROCEDURE — 99207 ZZC NON-BILLABLE SERV PER CHARTING: CPT | Performed by: PHYSICIAN ASSISTANT

## 2019-01-01 PROCEDURE — 81001 URINALYSIS AUTO W/SCOPE: CPT | Performed by: EMERGENCY MEDICINE

## 2019-01-01 PROCEDURE — 36415 COLL VENOUS BLD VENIPUNCTURE: CPT | Performed by: NURSE PRACTITIONER

## 2019-01-01 PROCEDURE — 85025 COMPLETE CBC W/AUTO DIFF WBC: CPT | Performed by: EMERGENCY MEDICINE

## 2019-01-01 RX ORDER — ALUMINA, MAGNESIA, AND SIMETHICONE 2400; 2400; 240 MG/30ML; MG/30ML; MG/30ML
30 SUSPENSION ORAL EVERY 4 HOURS PRN
Status: DISCONTINUED | OUTPATIENT
Start: 2019-01-01 | End: 2019-01-01 | Stop reason: HOSPADM

## 2019-01-01 RX ORDER — TIZANIDINE 2 MG/1
4 TABLET ORAL 2 TIMES DAILY PRN
Status: DISCONTINUED | OUTPATIENT
Start: 2019-01-01 | End: 2019-01-01 | Stop reason: HOSPADM

## 2019-01-01 RX ORDER — METOPROLOL TARTRATE 25 MG/1
25 TABLET, FILM COATED ORAL 2 TIMES DAILY
Status: DISCONTINUED | OUTPATIENT
Start: 2019-01-01 | End: 2019-01-01

## 2019-01-01 RX ORDER — FUROSEMIDE 20 MG
20 TABLET ORAL DAILY PRN
Status: DISCONTINUED | OUTPATIENT
Start: 2019-01-01 | End: 2019-01-01 | Stop reason: HOSPADM

## 2019-01-01 RX ORDER — FLUOCINONIDE TOPICAL SOLUTION USP, 0.05% 0.5 MG/ML
SOLUTION TOPICAL
Status: ON HOLD | COMMUNITY
End: 2019-01-01

## 2019-01-01 RX ORDER — SIMETHICONE 80 MG
80 TABLET,CHEWABLE ORAL 2 TIMES DAILY
Status: DISCONTINUED | OUTPATIENT
Start: 2019-01-01 | End: 2019-01-01 | Stop reason: HOSPADM

## 2019-01-01 RX ORDER — WARFARIN SODIUM 5 MG/1
5 TABLET ORAL DAILY
COMMUNITY

## 2019-01-01 RX ORDER — METFORMIN HCL 500 MG
500 TABLET, EXTENDED RELEASE 24 HR ORAL 3 TIMES DAILY
Refills: 3 | COMMUNITY
Start: 2019-01-01

## 2019-01-01 RX ORDER — ONDANSETRON 2 MG/ML
4 INJECTION INTRAMUSCULAR; INTRAVENOUS EVERY 6 HOURS PRN
Status: DISCONTINUED | OUTPATIENT
Start: 2019-01-01 | End: 2019-01-01 | Stop reason: HOSPADM

## 2019-01-01 RX ORDER — ALBUTEROL SULFATE 90 UG/1
2 AEROSOL, METERED RESPIRATORY (INHALATION) EVERY 6 HOURS PRN
Status: ON HOLD | COMMUNITY
End: 2019-01-01

## 2019-01-01 RX ORDER — NICOTINE POLACRILEX 4 MG
15-30 LOZENGE BUCCAL
Status: DISCONTINUED | OUTPATIENT
Start: 2019-01-01 | End: 2019-01-01 | Stop reason: HOSPADM

## 2019-01-01 RX ORDER — MULTIVITAMIN,THERAPEUTIC
1 TABLET ORAL DAILY
Status: DISCONTINUED | OUTPATIENT
Start: 2019-01-01 | End: 2019-01-01 | Stop reason: HOSPADM

## 2019-01-01 RX ORDER — LORAZEPAM 1 MG/1
1 TABLET ORAL EVERY 8 HOURS PRN
Status: DISCONTINUED | OUTPATIENT
Start: 2019-01-01 | End: 2019-01-01 | Stop reason: HOSPADM

## 2019-01-01 RX ORDER — METOPROLOL TARTRATE 25 MG/1
25 TABLET, FILM COATED ORAL 2 TIMES DAILY
COMMUNITY

## 2019-01-01 RX ORDER — ONDANSETRON 2 MG/ML
4 INJECTION INTRAMUSCULAR; INTRAVENOUS
Status: CANCELLED | OUTPATIENT
Start: 2019-01-01

## 2019-01-01 RX ORDER — BISACODYL 10 MG
10 SUPPOSITORY, RECTAL RECTAL DAILY PRN
Status: DISCONTINUED | OUTPATIENT
Start: 2019-01-01 | End: 2019-01-01 | Stop reason: HOSPADM

## 2019-01-01 RX ORDER — WARFARIN SODIUM 5 MG/1
5 TABLET ORAL
Status: COMPLETED | OUTPATIENT
Start: 2019-01-01 | End: 2019-01-01

## 2019-01-01 RX ORDER — ATORVASTATIN CALCIUM 40 MG/1
40 TABLET, FILM COATED ORAL EVERY EVENING
Status: DISCONTINUED | OUTPATIENT
Start: 2019-01-01 | End: 2019-01-01 | Stop reason: HOSPADM

## 2019-01-01 RX ORDER — METFORMIN HCL 500 MG
500 TABLET, EXTENDED RELEASE 24 HR ORAL 3 TIMES DAILY
Status: DISCONTINUED | OUTPATIENT
Start: 2019-01-01 | End: 2019-01-01 | Stop reason: HOSPADM

## 2019-01-01 RX ORDER — ACETAMINOPHEN 500 MG
500 TABLET ORAL 2 TIMES DAILY PRN
Status: DISCONTINUED | OUTPATIENT
Start: 2019-01-01 | End: 2019-01-01 | Stop reason: HOSPADM

## 2019-01-01 RX ORDER — EPHEDRINE SULFATE 50 MG/ML
INJECTION, SOLUTION INTRAMUSCULAR; INTRAVENOUS; SUBCUTANEOUS PRN
Status: DISCONTINUED | OUTPATIENT
Start: 2019-01-01 | End: 2019-01-01

## 2019-01-01 RX ORDER — NALOXONE HYDROCHLORIDE 0.4 MG/ML
.1-.4 INJECTION, SOLUTION INTRAMUSCULAR; INTRAVENOUS; SUBCUTANEOUS
Status: DISCONTINUED | OUTPATIENT
Start: 2019-01-01 | End: 2019-01-01 | Stop reason: HOSPADM

## 2019-01-01 RX ORDER — OLANZAPINE 5 MG/1
5 TABLET, ORALLY DISINTEGRATING ORAL 2 TIMES DAILY PRN
Status: DISCONTINUED | OUTPATIENT
Start: 2019-01-01 | End: 2019-01-01 | Stop reason: HOSPADM

## 2019-01-01 RX ORDER — ALBUTEROL SULFATE 90 UG/1
2 AEROSOL, METERED RESPIRATORY (INHALATION) EVERY 6 HOURS PRN
Status: DISCONTINUED | OUTPATIENT
Start: 2019-01-01 | End: 2019-01-01 | Stop reason: HOSPADM

## 2019-01-01 RX ORDER — ACETAMINOPHEN 325 MG/1
650 TABLET ORAL EVERY 4 HOURS PRN
Status: DISCONTINUED | OUTPATIENT
Start: 2019-01-01 | End: 2019-01-01 | Stop reason: HOSPADM

## 2019-01-01 RX ORDER — BACLOFEN 20 MG/1
20 TABLET ORAL 3 TIMES DAILY
Status: DISCONTINUED | OUTPATIENT
Start: 2019-01-01 | End: 2019-01-01 | Stop reason: HOSPADM

## 2019-01-01 RX ORDER — ACETAMINOPHEN 325 MG/1
325-650 TABLET ORAL 2 TIMES DAILY PRN
COMMUNITY

## 2019-01-01 RX ORDER — CETIRIZINE HYDROCHLORIDE 10 MG/1
10 TABLET ORAL DAILY
Status: DISCONTINUED | OUTPATIENT
Start: 2019-01-01 | End: 2019-01-01 | Stop reason: HOSPADM

## 2019-01-01 RX ORDER — METOPROLOL TARTRATE 25 MG/1
25 TABLET, FILM COATED ORAL 2 TIMES DAILY
Status: DISCONTINUED | OUTPATIENT
Start: 2019-01-01 | End: 2019-01-01 | Stop reason: HOSPADM

## 2019-01-01 RX ORDER — FUROSEMIDE 20 MG
20 TABLET ORAL DAILY
Status: DISCONTINUED | OUTPATIENT
Start: 2019-01-01 | End: 2019-01-01

## 2019-01-01 RX ORDER — HYDROXYZINE HYDROCHLORIDE 25 MG/1
25 TABLET, FILM COATED ORAL EVERY 4 HOURS PRN
Status: DISCONTINUED | OUTPATIENT
Start: 2019-01-01 | End: 2019-01-01 | Stop reason: HOSPADM

## 2019-01-01 RX ORDER — FLUMAZENIL 0.1 MG/ML
0.2 INJECTION, SOLUTION INTRAVENOUS
Status: DISCONTINUED | OUTPATIENT
Start: 2019-01-01 | End: 2019-01-01 | Stop reason: HOSPADM

## 2019-01-01 RX ORDER — WARFARIN SODIUM 3 MG/1
3 TABLET ORAL
Status: COMPLETED | OUTPATIENT
Start: 2019-01-01 | End: 2019-01-01

## 2019-01-01 RX ORDER — DULOXETIN HYDROCHLORIDE 60 MG/1
60 CAPSULE, DELAYED RELEASE ORAL 2 TIMES DAILY
COMMUNITY

## 2019-01-01 RX ORDER — PREGABALIN 200 MG/1
200 CAPSULE ORAL 3 TIMES DAILY
COMMUNITY

## 2019-01-01 RX ORDER — HYDROCODONE BITARTRATE AND ACETAMINOPHEN 7.5; 325 MG/1; MG/1
1 TABLET ORAL ONCE
Status: COMPLETED | OUTPATIENT
Start: 2019-01-01 | End: 2019-01-01

## 2019-01-01 RX ORDER — DULOXETIN HYDROCHLORIDE 60 MG/1
60 CAPSULE, DELAYED RELEASE ORAL 2 TIMES DAILY
Status: DISCONTINUED | OUTPATIENT
Start: 2019-01-01 | End: 2019-01-01 | Stop reason: HOSPADM

## 2019-01-01 RX ORDER — DEXTROSE MONOHYDRATE 25 G/50ML
25-50 INJECTION, SOLUTION INTRAVENOUS
Status: DISCONTINUED | OUTPATIENT
Start: 2019-01-01 | End: 2019-01-01 | Stop reason: HOSPADM

## 2019-01-01 RX ORDER — OLANZAPINE 5 MG/1
5 TABLET ORAL ONCE
Status: COMPLETED | OUTPATIENT
Start: 2019-01-01 | End: 2019-01-01

## 2019-01-01 RX ORDER — PROPOFOL 10 MG/ML
INJECTION, EMULSION INTRAVENOUS PRN
Status: DISCONTINUED | OUTPATIENT
Start: 2019-01-01 | End: 2019-01-01

## 2019-01-01 RX ORDER — METFORMIN HCL 500 MG
TABLET, EXTENDED RELEASE 24 HR ORAL
COMMUNITY
Start: 2019-01-01 | End: 2019-01-01

## 2019-01-01 RX ORDER — LIDOCAINE 40 MG/G
CREAM TOPICAL
Status: CANCELLED | OUTPATIENT
Start: 2019-01-01

## 2019-01-01 RX ORDER — WARFARIN SODIUM 5 MG/1
5 TABLET ORAL
Status: DISCONTINUED | OUTPATIENT
Start: 2019-01-01 | End: 2019-01-01 | Stop reason: HOSPADM

## 2019-01-01 RX ORDER — ATORVASTATIN CALCIUM 20 MG/1
40 TABLET, FILM COATED ORAL EVERY EVENING
Status: DISCONTINUED | OUTPATIENT
Start: 2019-01-01 | End: 2019-01-01 | Stop reason: HOSPADM

## 2019-01-01 RX ORDER — HYDROCODONE BITARTRATE AND ACETAMINOPHEN 7.5; 325 MG/1; MG/1
1 TABLET ORAL 3 TIMES DAILY
Status: DISCONTINUED | OUTPATIENT
Start: 2019-01-01 | End: 2019-01-01 | Stop reason: HOSPADM

## 2019-01-01 RX ORDER — ONDANSETRON 2 MG/ML
INJECTION INTRAMUSCULAR; INTRAVENOUS PRN
Status: DISCONTINUED | OUTPATIENT
Start: 2019-01-01 | End: 2019-01-01

## 2019-01-01 RX ORDER — ALCLOMETASONE DIPROPIONATE 0.5 MG/G
CREAM TOPICAL 2 TIMES DAILY PRN
Status: ON HOLD | COMMUNITY
End: 2019-01-01

## 2019-01-01 RX ORDER — ARIPIPRAZOLE 5 MG/1
5 TABLET ORAL EVERY MORNING
Qty: 30 TABLET | Refills: 0 | Status: ON HOLD | OUTPATIENT
Start: 2019-01-01 | End: 2019-01-01

## 2019-01-01 RX ORDER — CLOZAPINE 25 MG/1
25 TABLET ORAL AT BEDTIME
Status: DISCONTINUED | OUTPATIENT
Start: 2019-01-01 | End: 2019-01-01

## 2019-01-01 RX ORDER — PREGABALIN 100 MG/1
200 CAPSULE ORAL 3 TIMES DAILY
Status: DISCONTINUED | OUTPATIENT
Start: 2019-01-01 | End: 2019-01-01 | Stop reason: HOSPADM

## 2019-01-01 RX ORDER — TRAZODONE HYDROCHLORIDE 50 MG/1
50 TABLET, FILM COATED ORAL
Status: DISCONTINUED | OUTPATIENT
Start: 2019-01-01 | End: 2019-01-01 | Stop reason: HOSPADM

## 2019-01-01 RX ORDER — SODIUM CHLORIDE, SODIUM LACTATE, POTASSIUM CHLORIDE, CALCIUM CHLORIDE 600; 310; 30; 20 MG/100ML; MG/100ML; MG/100ML; MG/100ML
INJECTION, SOLUTION INTRAVENOUS CONTINUOUS PRN
Status: DISCONTINUED | OUTPATIENT
Start: 2019-01-01 | End: 2019-01-01

## 2019-01-01 RX ORDER — ALPRAZOLAM 0.5 MG
0.5 TABLET ORAL DAILY PRN
Status: DISCONTINUED | OUTPATIENT
Start: 2019-01-01 | End: 2019-01-01 | Stop reason: HOSPADM

## 2019-01-01 RX ORDER — PREGABALIN 100 MG/1
100 CAPSULE ORAL ONCE
Status: COMPLETED | OUTPATIENT
Start: 2019-01-01 | End: 2019-01-01

## 2019-01-01 RX ORDER — ONDANSETRON 4 MG/1
4 TABLET, ORALLY DISINTEGRATING ORAL EVERY 6 HOURS PRN
Status: DISCONTINUED | OUTPATIENT
Start: 2019-01-01 | End: 2019-01-01 | Stop reason: HOSPADM

## 2019-01-01 RX ORDER — FUROSEMIDE 20 MG
40 TABLET ORAL DAILY
COMMUNITY

## 2019-01-01 RX ORDER — PROPOFOL 10 MG/ML
INJECTION, EMULSION INTRAVENOUS CONTINUOUS PRN
Status: DISCONTINUED | OUTPATIENT
Start: 2019-01-01 | End: 2019-01-01

## 2019-01-01 RX ORDER — ARIPIPRAZOLE 5 MG/1
TABLET ORAL
Qty: 81 TABLET | Refills: 0 | Status: SHIPPED | OUTPATIENT
Start: 2019-01-01 | End: 2019-01-01

## 2019-01-01 RX ORDER — POLYETHYLENE GLYCOL 3350 17 G/17G
17 POWDER, FOR SOLUTION ORAL DAILY PRN
Status: DISCONTINUED | OUTPATIENT
Start: 2019-01-01 | End: 2019-01-01 | Stop reason: HOSPADM

## 2019-01-01 RX ORDER — POTASSIUM CHLORIDE 1500 MG/1
20 TABLET, EXTENDED RELEASE ORAL DAILY PRN
Status: DISCONTINUED | OUTPATIENT
Start: 2019-01-01 | End: 2019-01-01 | Stop reason: HOSPADM

## 2019-01-01 RX ORDER — ARIPIPRAZOLE 5 MG/1
5 TABLET ORAL EVERY MORNING
Status: DISCONTINUED | OUTPATIENT
Start: 2019-01-01 | End: 2019-01-01

## 2019-01-01 RX ORDER — ARIPIPRAZOLE 5 MG/1
5 TABLET ORAL EVERY MORNING
Status: DISCONTINUED | OUTPATIENT
Start: 2019-01-01 | End: 2019-01-01 | Stop reason: HOSPADM

## 2019-01-01 RX ORDER — PREGABALIN 100 MG/1
100 CAPSULE ORAL 3 TIMES DAILY
Status: DISCONTINUED | OUTPATIENT
Start: 2019-01-01 | End: 2019-01-01 | Stop reason: HOSPADM

## 2019-01-01 RX ADMIN — SIMETHICONE CHEW TAB 80 MG 80 MG: 80 TABLET ORAL at 08:09

## 2019-01-01 RX ADMIN — THERA TABS 1 TABLET: TAB at 08:31

## 2019-01-01 RX ADMIN — HYDROCODONE BITARTRATE AND ACETAMINOPHEN 1 TABLET: 7.5; 325 TABLET ORAL at 15:05

## 2019-01-01 RX ADMIN — DEXMEDETOMIDINE HYDROCHLORIDE 4 MCG: 100 INJECTION, SOLUTION INTRAVENOUS at 09:28

## 2019-01-01 RX ADMIN — BACLOFEN 20 MG: 20 TABLET ORAL at 21:26

## 2019-01-01 RX ADMIN — OMEPRAZOLE 20 MG: 20 CAPSULE, DELAYED RELEASE ORAL at 08:31

## 2019-01-01 RX ADMIN — LORAZEPAM 1 MG: 1 TABLET ORAL at 14:55

## 2019-01-01 RX ADMIN — PREGABALIN 100 MG: 100 CAPSULE ORAL at 22:11

## 2019-01-01 RX ADMIN — SIMETHICONE CHEW TAB 80 MG 80 MG: 80 TABLET ORAL at 07:55

## 2019-01-01 RX ADMIN — HYDROCODONE BITARTRATE AND ACETAMINOPHEN 1 TABLET: 7.5; 325 TABLET ORAL at 07:55

## 2019-01-01 RX ADMIN — DULOXETINE HYDROCHLORIDE 60 MG: 60 CAPSULE, DELAYED RELEASE ORAL at 08:09

## 2019-01-01 RX ADMIN — DEXMEDETOMIDINE HYDROCHLORIDE 4 MCG: 100 INJECTION, SOLUTION INTRAVENOUS at 09:22

## 2019-01-01 RX ADMIN — PROPOFOL 40 MG: 10 INJECTION, EMULSION INTRAVENOUS at 09:23

## 2019-01-01 RX ADMIN — PREGABALIN 100 MG: 100 CAPSULE ORAL at 15:53

## 2019-01-01 RX ADMIN — PREGABALIN 100 MG: 100 CAPSULE ORAL at 16:03

## 2019-01-01 RX ADMIN — MIDAZOLAM 1 MG: 1 INJECTION INTRAMUSCULAR; INTRAVENOUS at 09:20

## 2019-01-01 RX ADMIN — CETIRIZINE HYDROCHLORIDE 10 MG: 10 TABLET, FILM COATED ORAL at 08:09

## 2019-01-01 RX ADMIN — ARIPIPRAZOLE 5 MG: 5 TABLET ORAL at 08:15

## 2019-01-01 RX ADMIN — METFORMIN HYDROCHLORIDE 500 MG: 500 TABLET, EXTENDED RELEASE ORAL at 21:49

## 2019-01-01 RX ADMIN — SIMETHICONE CHEW TAB 80 MG 80 MG: 80 TABLET ORAL at 08:15

## 2019-01-01 RX ADMIN — OMEPRAZOLE 20 MG: 20 CAPSULE, DELAYED RELEASE ORAL at 21:48

## 2019-01-01 RX ADMIN — PREGABALIN 200 MG: 100 CAPSULE ORAL at 15:05

## 2019-01-01 RX ADMIN — HYDROCODONE BITARTRATE AND ACETAMINOPHEN 1 TABLET: 7.5; 325 TABLET ORAL at 09:01

## 2019-01-01 RX ADMIN — ARIPIPRAZOLE 5 MG: 5 TABLET ORAL at 10:01

## 2019-01-01 RX ADMIN — ATORVASTATIN CALCIUM 40 MG: 40 TABLET, FILM COATED ORAL at 21:33

## 2019-01-01 RX ADMIN — METFORMIN HYDROCHLORIDE 500 MG: 500 TABLET, EXTENDED RELEASE ORAL at 15:53

## 2019-01-01 RX ADMIN — TIZANIDINE 4 MG: 2 TABLET ORAL at 15:03

## 2019-01-01 RX ADMIN — METFORMIN HYDROCHLORIDE 500 MG: 500 TABLET, EXTENDED RELEASE ORAL at 08:31

## 2019-01-01 RX ADMIN — MIDAZOLAM 1 MG: 1 INJECTION INTRAMUSCULAR; INTRAVENOUS at 09:15

## 2019-01-01 RX ADMIN — CETIRIZINE HYDROCHLORIDE 10 MG: 10 TABLET, FILM COATED ORAL at 21:33

## 2019-01-01 RX ADMIN — BACLOFEN 20 MG: 20 TABLET ORAL at 08:14

## 2019-01-01 RX ADMIN — DULOXETINE HYDROCHLORIDE 60 MG: 60 CAPSULE, DELAYED RELEASE ORAL at 08:05

## 2019-01-01 RX ADMIN — METOPROLOL TARTRATE 25 MG: 25 TABLET ORAL at 21:49

## 2019-01-01 RX ADMIN — BACLOFEN 20 MG: 20 TABLET ORAL at 21:51

## 2019-01-01 RX ADMIN — DULOXETINE HYDROCHLORIDE 60 MG: 60 CAPSULE, DELAYED RELEASE ORAL at 22:03

## 2019-01-01 RX ADMIN — SIMETHICONE CHEW TAB 80 MG 80 MG: 80 TABLET ORAL at 21:51

## 2019-01-01 RX ADMIN — CETIRIZINE HYDROCHLORIDE 10 MG: 10 TABLET, FILM COATED ORAL at 07:55

## 2019-01-01 RX ADMIN — TIZANIDINE 4 MG: 2 TABLET ORAL at 02:38

## 2019-01-01 RX ADMIN — METOPROLOL TARTRATE 25 MG: 25 TABLET ORAL at 22:03

## 2019-01-01 RX ADMIN — WARFARIN SODIUM 3 MG: 3 TABLET ORAL at 22:03

## 2019-01-01 RX ADMIN — ONDANSETRON 4 MG: 2 INJECTION INTRAMUSCULAR; INTRAVENOUS at 09:20

## 2019-01-01 RX ADMIN — BACLOFEN 20 MG: 20 TABLET ORAL at 14:17

## 2019-01-01 RX ADMIN — OMEPRAZOLE 20 MG: 20 CAPSULE, DELAYED RELEASE ORAL at 07:55

## 2019-01-01 RX ADMIN — PREGABALIN 100 MG: 100 CAPSULE ORAL at 07:55

## 2019-01-01 RX ADMIN — BACLOFEN 20 MG: 20 TABLET ORAL at 15:53

## 2019-01-01 RX ADMIN — ATORVASTATIN CALCIUM 40 MG: 40 TABLET, FILM COATED ORAL at 21:48

## 2019-01-01 RX ADMIN — METFORMIN HYDROCHLORIDE 500 MG: 500 TABLET, EXTENDED RELEASE ORAL at 15:05

## 2019-01-01 RX ADMIN — PHENYLEPHRINE HYDROCHLORIDE 50 MCG: 10 INJECTION, SOLUTION INTRAMUSCULAR; INTRAVENOUS; SUBCUTANEOUS at 09:50

## 2019-01-01 RX ADMIN — SIMETHICONE CHEW TAB 80 MG 80 MG: 80 TABLET ORAL at 22:11

## 2019-01-01 RX ADMIN — PREGABALIN 200 MG: 100 CAPSULE ORAL at 08:32

## 2019-01-01 RX ADMIN — SIMETHICONE CHEW TAB 80 MG 80 MG: 80 TABLET ORAL at 20:21

## 2019-01-01 RX ADMIN — TIZANIDINE 4 MG: 4 TABLET ORAL at 15:05

## 2019-01-01 RX ADMIN — HYDROCODONE BITARTRATE AND ACETAMINOPHEN 1 TABLET: 7.5; 325 TABLET ORAL at 14:17

## 2019-01-01 RX ADMIN — DULOXETINE HYDROCHLORIDE 60 MG: 60 CAPSULE, DELAYED RELEASE ORAL at 21:51

## 2019-01-01 RX ADMIN — DULOXETINE HYDROCHLORIDE 60 MG: 60 CAPSULE, DELAYED RELEASE ORAL at 07:55

## 2019-01-01 RX ADMIN — THERA TABS 1 TABLET: TAB at 08:09

## 2019-01-01 RX ADMIN — HYDROXYZINE HYDROCHLORIDE 25 MG: 25 TABLET, FILM COATED ORAL at 01:12

## 2019-01-01 RX ADMIN — PREGABALIN 100 MG: 100 CAPSULE ORAL at 21:49

## 2019-01-01 RX ADMIN — Medication 7.5 MG: at 08:31

## 2019-01-01 RX ADMIN — THERA TABS 1 TABLET: TAB at 21:33

## 2019-01-01 RX ADMIN — TIZANIDINE 4 MG: 2 TABLET ORAL at 05:21

## 2019-01-01 RX ADMIN — THERA TABS 1 TABLET: TAB at 07:55

## 2019-01-01 RX ADMIN — INSULIN ASPART 1 UNITS: 100 INJECTION, SOLUTION INTRAVENOUS; SUBCUTANEOUS at 08:57

## 2019-01-01 RX ADMIN — DULOXETINE HYDROCHLORIDE 60 MG: 60 CAPSULE, DELAYED RELEASE ORAL at 08:14

## 2019-01-01 RX ADMIN — DULOXETINE HYDROCHLORIDE 60 MG: 60 CAPSULE, DELAYED RELEASE ORAL at 21:48

## 2019-01-01 RX ADMIN — PHENYLEPHRINE HYDROCHLORIDE 50 MCG: 10 INJECTION, SOLUTION INTRAMUSCULAR; INTRAVENOUS; SUBCUTANEOUS at 09:48

## 2019-01-01 RX ADMIN — HYDROCODONE BITARTRATE AND ACETAMINOPHEN 1 TABLET: 7.5; 325 TABLET ORAL at 15:53

## 2019-01-01 RX ADMIN — METOPROLOL TARTRATE 25 MG: 25 TABLET ORAL at 08:03

## 2019-01-01 RX ADMIN — METFORMIN HYDROCHLORIDE 500 MG: 500 TABLET, EXTENDED RELEASE ORAL at 21:51

## 2019-01-01 RX ADMIN — HYDROCODONE BITARTRATE AND ACETAMINOPHEN 1 TABLET: 7.5; 325 TABLET ORAL at 08:14

## 2019-01-01 RX ADMIN — Medication 5 MG: at 09:37

## 2019-01-01 RX ADMIN — METOPROLOL TARTRATE 25 MG: 25 TABLET ORAL at 10:01

## 2019-01-01 RX ADMIN — Medication 5 MG: at 09:40

## 2019-01-01 RX ADMIN — METOPROLOL TARTRATE 25 MG: 25 TABLET ORAL at 21:51

## 2019-01-01 RX ADMIN — PREGABALIN 200 MG: 100 CAPSULE ORAL at 14:17

## 2019-01-01 RX ADMIN — METFORMIN HYDROCHLORIDE 500 MG: 500 TABLET, EXTENDED RELEASE ORAL at 08:09

## 2019-01-01 RX ADMIN — METFORMIN HYDROCHLORIDE 500 MG: 500 TABLET, EXTENDED RELEASE ORAL at 08:14

## 2019-01-01 RX ADMIN — PREGABALIN 100 MG: 100 CAPSULE ORAL at 21:50

## 2019-01-01 RX ADMIN — HYDROCODONE BITARTRATE AND ACETAMINOPHEN 1 TABLET: 7.5; 325 TABLET ORAL at 08:10

## 2019-01-01 RX ADMIN — PREGABALIN 100 MG: 100 CAPSULE ORAL at 08:10

## 2019-01-01 RX ADMIN — DULOXETINE HYDROCHLORIDE 60 MG: 60 CAPSULE, DELAYED RELEASE ORAL at 21:26

## 2019-01-01 RX ADMIN — HYDROCODONE BITARTRATE AND ACETAMINOPHEN 1 TABLET: 7.5; 325 TABLET ORAL at 21:25

## 2019-01-01 RX ADMIN — BACLOFEN 20 MG: 20 TABLET ORAL at 08:09

## 2019-01-01 RX ADMIN — BACLOFEN 20 MG: 20 TABLET ORAL at 08:05

## 2019-01-01 RX ADMIN — METFORMIN HYDROCHLORIDE 500 MG: 500 TABLET, EXTENDED RELEASE ORAL at 10:57

## 2019-01-01 RX ADMIN — OMEPRAZOLE 20 MG: 20 CAPSULE, DELAYED RELEASE ORAL at 08:05

## 2019-01-01 RX ADMIN — PREGABALIN 200 MG: 100 CAPSULE ORAL at 09:01

## 2019-01-01 RX ADMIN — METOPROLOL TARTRATE 25 MG: 25 TABLET ORAL at 12:02

## 2019-01-01 RX ADMIN — DEXMEDETOMIDINE HYDROCHLORIDE 4 MCG: 100 INJECTION, SOLUTION INTRAVENOUS at 09:18

## 2019-01-01 RX ADMIN — HYDROCODONE BITARTRATE AND ACETAMINOPHEN 1 TABLET: 7.5; 325 TABLET ORAL at 08:31

## 2019-01-01 RX ADMIN — BACLOFEN 20 MG: 20 TABLET ORAL at 08:31

## 2019-01-01 RX ADMIN — THERA TABS 1 TABLET: TAB at 08:14

## 2019-01-01 RX ADMIN — METFORMIN HYDROCHLORIDE 500 MG: 500 TABLET, EXTENDED RELEASE ORAL at 22:11

## 2019-01-01 RX ADMIN — PREGABALIN 200 MG: 100 CAPSULE ORAL at 21:27

## 2019-01-01 RX ADMIN — PREGABALIN 100 MG: 100 CAPSULE ORAL at 14:46

## 2019-01-01 RX ADMIN — HYDROCODONE BITARTRATE AND ACETAMINOPHEN 1 TABLET: 7.5; 325 TABLET ORAL at 21:48

## 2019-01-01 RX ADMIN — TIZANIDINE 4 MG: 4 TABLET ORAL at 01:17

## 2019-01-01 RX ADMIN — METFORMIN HYDROCHLORIDE 500 MG: 500 TABLET, EXTENDED RELEASE ORAL at 07:55

## 2019-01-01 RX ADMIN — TIZANIDINE 4 MG: 2 TABLET ORAL at 16:03

## 2019-01-01 RX ADMIN — OMEPRAZOLE 20 MG: 20 CAPSULE, DELAYED RELEASE ORAL at 21:32

## 2019-01-01 RX ADMIN — HYDROCODONE BITARTRATE AND ACETAMINOPHEN 1 TABLET: 7.5; 325 TABLET ORAL at 14:46

## 2019-01-01 RX ADMIN — SIMETHICONE CHEW TAB 80 MG 80 MG: 80 TABLET ORAL at 08:03

## 2019-01-01 RX ADMIN — TIZANIDINE 4 MG: 2 TABLET ORAL at 21:32

## 2019-01-01 RX ADMIN — PREGABALIN 100 MG: 100 CAPSULE ORAL at 08:15

## 2019-01-01 RX ADMIN — ARIPIPRAZOLE 5 MG: 5 TABLET ORAL at 08:05

## 2019-01-01 RX ADMIN — METOPROLOL TARTRATE 25 MG: 25 TABLET ORAL at 08:15

## 2019-01-01 RX ADMIN — BACLOFEN 20 MG: 20 TABLET ORAL at 15:05

## 2019-01-01 RX ADMIN — METFORMIN HYDROCHLORIDE 500 MG: 500 TABLET, EXTENDED RELEASE ORAL at 16:04

## 2019-01-01 RX ADMIN — ATORVASTATIN CALCIUM 40 MG: 40 TABLET, FILM COATED ORAL at 20:24

## 2019-01-01 RX ADMIN — WARFARIN SODIUM 5 MG: 5 TABLET ORAL at 17:35

## 2019-01-01 RX ADMIN — HYDROCODONE BITARTRATE AND ACETAMINOPHEN 1 TABLET: 7.5; 325 TABLET ORAL at 22:11

## 2019-01-01 RX ADMIN — BACLOFEN 20 MG: 20 TABLET ORAL at 07:55

## 2019-01-01 RX ADMIN — SIMETHICONE CHEW TAB 80 MG 80 MG: 80 TABLET ORAL at 08:31

## 2019-01-01 RX ADMIN — OMEPRAZOLE 20 MG: 20 CAPSULE, DELAYED RELEASE ORAL at 21:51

## 2019-01-01 RX ADMIN — WARFARIN SODIUM 5 MG: 5 TABLET ORAL at 17:44

## 2019-01-01 RX ADMIN — WARFARIN SODIUM 5 MG: 5 TABLET ORAL at 17:45

## 2019-01-01 RX ADMIN — SODIUM CHLORIDE, POTASSIUM CHLORIDE, SODIUM LACTATE AND CALCIUM CHLORIDE: 600; 310; 30; 20 INJECTION, SOLUTION INTRAVENOUS at 09:15

## 2019-01-01 RX ADMIN — OMEPRAZOLE 20 MG: 20 CAPSULE, DELAYED RELEASE ORAL at 08:09

## 2019-01-01 RX ADMIN — HYDROCODONE BITARTRATE AND ACETAMINOPHEN 1 TABLET: 7.5; 325 TABLET ORAL at 15:54

## 2019-01-01 RX ADMIN — METOPROLOL TARTRATE 25 MG: 25 TABLET ORAL at 08:09

## 2019-01-01 RX ADMIN — CETIRIZINE HYDROCHLORIDE 10 MG: 10 TABLET, FILM COATED ORAL at 08:03

## 2019-01-01 RX ADMIN — OLANZAPINE 5 MG: 5 TABLET, FILM COATED ORAL at 04:42

## 2019-01-01 RX ADMIN — METOPROLOL TARTRATE 25 MG: 25 TABLET ORAL at 21:26

## 2019-01-01 RX ADMIN — WARFARIN SODIUM 5 MG: 5 TABLET ORAL at 17:17

## 2019-01-01 RX ADMIN — METFORMIN HYDROCHLORIDE 500 MG: 500 TABLET, EXTENDED RELEASE ORAL at 14:17

## 2019-01-01 RX ADMIN — CETIRIZINE HYDROCHLORIDE 10 MG: 10 TABLET, FILM COATED ORAL at 08:14

## 2019-01-01 RX ADMIN — SIMETHICONE CHEW TAB 80 MG 80 MG: 80 TABLET ORAL at 21:48

## 2019-01-01 RX ADMIN — CETIRIZINE HYDROCHLORIDE 10 MG: 10 TABLET, FILM COATED ORAL at 08:31

## 2019-01-01 RX ADMIN — BACLOFEN 20 MG: 20 TABLET ORAL at 21:49

## 2019-01-01 RX ADMIN — HYDROCODONE BITARTRATE AND ACETAMINOPHEN 1 TABLET: 7.5; 325 TABLET ORAL at 16:04

## 2019-01-01 RX ADMIN — OMEPRAZOLE 20 MG: 20 CAPSULE, DELAYED RELEASE ORAL at 08:14

## 2019-01-01 RX ADMIN — OMEPRAZOLE 20 MG: 20 CAPSULE, DELAYED RELEASE ORAL at 21:27

## 2019-01-01 RX ADMIN — HYDROCODONE BITARTRATE AND ACETAMINOPHEN 1 TABLET: 7.5; 325 TABLET ORAL at 21:51

## 2019-01-01 RX ADMIN — BACLOFEN 20 MG: 20 TABLET ORAL at 15:54

## 2019-01-01 RX ADMIN — THERA TABS 1 TABLET: TAB at 08:05

## 2019-01-01 RX ADMIN — METFORMIN HYDROCHLORIDE 500 MG: 500 TABLET, EXTENDED RELEASE ORAL at 21:26

## 2019-01-01 RX ADMIN — BACLOFEN 20 MG: 20 TABLET ORAL at 23:02

## 2019-01-01 RX ADMIN — PROPOFOL 50 MCG/KG/MIN: 10 INJECTION, EMULSION INTRAVENOUS at 09:20

## 2019-01-01 RX ADMIN — DEXMEDETOMIDINE HYDROCHLORIDE 4 MCG: 100 INJECTION, SOLUTION INTRAVENOUS at 09:15

## 2019-01-01 RX ADMIN — FUROSEMIDE 20 MG: 20 TABLET ORAL at 08:03

## 2019-01-01 RX ADMIN — DULOXETINE HYDROCHLORIDE 60 MG: 60 CAPSULE, DELAYED RELEASE ORAL at 08:31

## 2019-01-01 RX ADMIN — ATORVASTATIN CALCIUM 40 MG: 20 TABLET, FILM COATED ORAL at 21:26

## 2019-01-01 ASSESSMENT — ENCOUNTER SYMPTOMS
HALLUCINATIONS: 1
NAUSEA: 0
HEADACHES: 0
SEIZURES: 0
CHILLS: 0
DIARRHEA: 1
BACK PAIN: 1
DYSRHYTHMIAS: 1
HALLUCINATIONS: 0
VOMITING: 0

## 2019-01-01 ASSESSMENT — ACTIVITIES OF DAILY LIVING (ADL)
TRANSFERRING: 1-->ASSISTIVE EQUIPMENT
COGNITION: 1 - ATTENTION OR MEMORY DEFICITS
TRANSFERRING: 0-->INDEPENDENT
DRESS: 2-->ASSISTIVE PERSON
ORAL_HYGIENE: INDEPENDENT
SWALLOWING: 0-->SWALLOWS FOODS/LIQUIDS WITHOUT DIFFICULTY
AMBULATION: 1-->ASSISTIVE EQUIPMENT
SWALLOWING: 0-->SWALLOWS FOODS/LIQUIDS WITHOUT DIFFICULTY
BATHING: 0-->INDEPENDENT
TOILETING: 0-->INDEPENDENT
ORAL_HYGIENE: INDEPENDENT
WHICH_OF_THE_ABOVE_FUNCTIONAL_RISKS_HAD_A_RECENT_ONSET_OR_CHANGE?: AMBULATION;TRANSFERRING;TOILETING;BATHING;DRESSING;COGNITION
DRESS: STREET CLOTHES;SCRUBS (BEHAVIORAL HEALTH);INDEPENDENT
RETIRED_COMMUNICATION: 0-->UNDERSTANDS/COMMUNICATES WITHOUT DIFFICULTY
RETIRED_EATING: 0-->INDEPENDENT
COGNITION: 0 - NO COGNITION ISSUES REPORTED
RETIRED_EATING: 0-->INDEPENDENT
FALL_HISTORY_WITHIN_LAST_SIX_MONTHS: NO
HYGIENE/GROOMING: INDEPENDENT
FALL_HISTORY_WITHIN_LAST_SIX_MONTHS: NO
HYGIENE/GROOMING: INDEPENDENT
HYGIENE/GROOMING: INDEPENDENT
LAUNDRY: WITH SUPERVISION
RETIRED_COMMUNICATION: 0-->UNDERSTANDS/COMMUNICATES WITHOUT DIFFICULTY
TOILETING: 1-->ASSISTIVE EQUIPMENT
LAUNDRY: WITH SUPERVISION
DRESS: 0-->INDEPENDENT
LAUNDRY: WITH SUPERVISION
DRESS: SCRUBS (BEHAVIORAL HEALTH);INDEPENDENT
BATHING: 3-->ASSISTIVE EQUIPMENT AND PERSON
DRESS: SCRUBS (BEHAVIORAL HEALTH)
ORAL_HYGIENE: INDEPENDENT
AMBULATION: 3-->ASSISTIVE EQUIPMENT AND PERSON

## 2019-01-01 ASSESSMENT — MIFFLIN-ST. JEOR
SCORE: 1599.01
SCORE: 1598.69
SCORE: 1610.47
SCORE: 1582.8
SCORE: 1600.83
SCORE: 1591.42

## 2019-01-28 NOTE — ANESTHESIA PREPROCEDURE EVALUATION
Anesthesia Pre-Procedure Evaluation    Patient: Samira Peralta   MRN: [id number] : 1955          Preoperative Diagnosis: ABDOMINAL PAIN, CHANGE IN BOWEL MOVEMENT, GAS AND BLOATING    Procedure(s):  COMBINED ESOPHAGOSCOPY, GASTROSCOPY, DUODENOSCOPY (EGD)  COLONOSCOPY    Past Medical History:   Diagnosis Date     Anxiety and depression      Brachial plexitis      Face pain      Headache(784.0)      Hearing loss      Heart burn      Hypertension      Itchy eyes      Seasonal allergies      Sinus pain      Sleep apnea      Sleep apnea      Snoring      Stuffy nose      Weakness      Wound healing, delayed      Past Surgical History:   Procedure Laterality Date     BREAST SURGERY      Breast reduction     Hysterectomy[       ORTHOPEDIC SURGERY      Left Knee tear     Cardiac cath 2018  Cardiac Cath Report     Procedures performed  1.  Selective right and left coronary angiography  2.  Left ventriculogram  3.  Selective right femoral angiography  4.  Angio-Seal for hemostasis     Indication     Patient is a 63-year-old female who had a fall. Her troponin is  elevated with ischemic EKG changes. Taken to cath lab     Narrative     Patient is prepped and draped in appropriate sterile manner.  1%  lidocaine infiltrated right groin. Using modified Seldinger technique  and a micropuncture kit access obtained right common femoral artery.  Coronary angiography is then performed using a 6 [place] JL4 and 3 DRC  catheter respectively. Pigtail catheter placed into the LV cavity.  Following that Angio-Seal is used for hemostasis     Medications  1.  The patient received conscious sedation consisting of 2 mg of  Versed and 100 mcg of fentanyl. The total sedation time was 18  minutes. Heart rate, blood pressure, oxygen saturation, and patient  responses were monitored throughout the procedure with the RN  assistance  2.  I determine this patient to be an appropriate candidate for the  planned sedation and  procedure and have reassessed the patient  immediately prior to sedation and procedure.     LV gram:  Unable to assess left ventricular end-diastolic pressure due  to significant tachycardia. There is variability. The ejection  fraction is estimated to be 30-35% with preservation of contraction at  the base which is hypercontractile. The anterior lateral apical  inferior apical walls are akinetic, taken together the pattern  suggests stress cardiomyopathy.     Coronary findings:     Coronary circulation is right dominant     LM: Left main is a large in caliber short in length vessel. This  vessel gives rise to the LAD and circumflex respectively. There is no  disease in the left main     LAD: Left anterior descending artery is a large-caliber vessel which  extends and wraps on apex. There are several septal perforators and 1  major diagonal vessel in the midportion. The mid to distal segment is  tortuous and there is scattered disease of no more than 10% within  this vessel or its branches.     CFX: Circumflex is a large-caliber nondominant vessel. This vessel  gives rise to 1 major obtuse marginal branch in the distal segment  which then bifurcates into a superior and inferior limb. There is an  ongoing AV groove branch. Proximally there is a 10-15% eccentric  stenosis     RCA: Right coronary artery is a medium size dominant vessel. This  vessel gives rise to a RV marginal branch proximally and distally  terminates into a small to medium sized PDA and a 6 very small  posterior lateral branch. There is minimal disease of no more than  10-15% within this vessel or its branches.     Summary  1.  Minimal nonocclusive coronary artery disease  2.  LV gram pattern suggests stress cardiomyopathy     Recommendations  1.  Secondary prevention as appropriate  2.  Two hours bedrest post Angio-Seal     CANDIE SWANSON MD    Echo 7/2018  Interpretation Summary     The visual ejection fraction is estimated at 42-47%.  Limited  echo, by direct comparison to echo 7-25-18 there has been improvement  in wall motion. The distal septum and apex are now hypokinetic not akinetic.  Infero/apex wall is severely hypokinetic/akinetic but overall the walls are  better and EF better now.  _____________________________________________________________________________  __        Left Ventricle  The left ventricle is normal in size. The visual ejection fraction is  estimated at 42-47%. There is moderate apical wall hypokinesis. There is no  thrombus seen in the left ventricle.     Pericardium  The pericardium appears normal.     Rhythm  The rhythm was sinus bradycardia.    EKG  28-JUL-2018 08:15:34 [facility]-S6 SO ROUTINE RECORD  Sinus rhythm  DIffuse ST and T wave abnormality  Prolonged QT  Abnormal ECG  When compared with ECG of 25-JUL-2018 14:28, (unconfirmed)  Premature atrial complexes are no longer Present    Creatinine 1.2  1/11/2019  K 4.3  1/11/2019  INR  2.4  1/11/19    Anesthesia Evaluation     . Pt has had prior anesthetic.     No history of anesthetic complications          ROS/MED HX    ENT/Pulmonary: Comment: Seasonal allergies    (+)sleep apnea, doesn't use CPAP , recent URI resolved . .    Neurologic: Comment: Brachial plexitis right side    (+)CVA with deficits- right sided weakness,    (-) seizures and migraines   Cardiovascular:     (+) hypertension----. Taking blood thinners : . . . :. dysrhythmias a-fib, .       METS/Exercise Tolerance:     Hematologic:     (+) History of blood clots (PE and DVT) pt is anticoagulated, -      Musculoskeletal:  - neg musculoskeletal ROS       GI/Hepatic: Comment: Sleeps with head elevated, meds not completely effective    (+) GERD       Renal/Genitourinary:     (+) chronic renal disease, type: CRI,       Endo:  - neg endo ROS    (-) Type II DM and thyroid disease   Psychiatric:     (+) psychiatric history anxiety, depression and schizophrenia      Infectious Disease:  - neg infectious  "disease ROS       Malignancy:         Other: Comment: Medical noncompliance  Hearing loss                         Physical Exam  Normal systems: pulmonary and dental    Airway   Mallampati: III  TM distance: >3 FB  Neck ROM: limited    Dental     Cardiovascular   Rhythm and rate: regular and normal      Pulmonary    breath sounds clear to auscultation            Lab Results   Component Value Date    WBC 5.9 07/26/2018    HGB 11.4 (L) 07/26/2018    HCT 34.4 (L) 07/26/2018     07/26/2018    SED 17 03/29/2013     07/28/2018    POTASSIUM 3.8 07/28/2018    CHLORIDE 103 07/28/2018    CO2 27 07/28/2018    BUN 19 07/28/2018    CR 0.86 07/28/2018     (H) 07/28/2018    MIRTHA 8.6 07/28/2018    ALBUMIN 3.9 07/25/2018    PROTTOTAL 8.5 07/25/2018    ALT 45 07/25/2018    AST 64 (H) 07/25/2018    ALKPHOS 89 07/25/2018    BILITOTAL 1.2 07/25/2018    LIPASE 62 12/23/2011    INR 2.13 (H) 07/28/2018    TSH 1.65 05/24/2018       Preop Vitals  BP Readings from Last 3 Encounters:   07/29/18 136/89   07/28/18 128/59   06/26/18 (!) 147/109    Pulse Readings from Last 3 Encounters:   07/28/18 66   06/26/18 62   12/23/11 70      Resp Readings from Last 3 Encounters:   07/29/18 18   07/28/18 16   06/26/18 18    SpO2 Readings from Last 3 Encounters:   07/29/18 98%   07/28/18 95%   06/26/18 95%      Temp Readings from Last 1 Encounters:   07/29/18 36.6  C (97.8  F) (Temporal)    Ht Readings from Last 1 Encounters:   07/29/18 1.6 m (5' 3\")      Wt Readings from Last 1 Encounters:   07/29/18 111.1 kg (245 lb)    Estimated body mass index is 43.4 kg/m  as calculated from the following:    Height as of 7/29/18: 1.6 m (5' 3\").    Weight as of 7/29/18: 111.1 kg (245 lb).       Anesthesia Plan      History & Physical Review  History and physical reviewed and following examination; no interval change.    ASA Status:  3 .    NPO Status:  > 8 hours    Plan for MAC Reason for MAC:  Deep or markedly invasive procedure (G8)  PONV " prophylaxis:  Ondansetron (or other 5HT-3)       Postoperative Care  Postoperative pain management:  IV analgesics.      Consents  Anesthetic plan, risks, benefits and alternatives discussed with:  Patient..                 Mo Luevano MD

## 2019-01-28 NOTE — ANESTHESIA CARE TRANSFER NOTE
Patient: Samira Cullen    Procedure(s):  COMBINED ESOPHAGOSCOPY, GASTROSCOPY, DUODENOSCOPY (EGD), BIOPSY SINGLE OR MULTIPLE  COLONOSCOPY    Diagnosis: ABDOMINAL PAIN, CHANGE IN BOWEL MOVEMENT, GAS AND BLOATING  Diagnosis Additional Information: No value filed.    Anesthesia Type:   MAC     Note:    Destination: endocopy recovery.  Comments: To recovery. Vss. Denies pain. Report given to rN assuming care of pt      Vitals: (Last set prior to Anesthesia Care Transfer)    CRNA VITALS  1/28/2019 0928 - 1/28/2019 0958      1/28/2019             Ht Rate:  64    Resp Rate (set):  10                Electronically Signed By: DEXTER Griffith CRNA  January 28, 2019  9:58 AM

## 2019-01-28 NOTE — ANESTHESIA POSTPROCEDURE EVALUATION
Patient: Samira Cullen    Procedure(s):  COMBINED ESOPHAGOSCOPY, GASTROSCOPY, DUODENOSCOPY (EGD), BIOPSY SINGLE OR MULTIPLE  COLONOSCOPY    Diagnosis:ABDOMINAL PAIN, CHANGE IN BOWEL MOVEMENT, GAS AND BLOATING  Diagnosis Additional Information: No value filed.    Anesthesia Type:  MAC    Note:  Anesthesia Post Evaluation    Patient location during evaluation: PACU  Patient participation: Able to fully participate in evaluation  Level of consciousness: awake  Pain management: adequate  Airway patency: patent  Cardiovascular status: acceptable  Respiratory status: acceptable  Hydration status: acceptable  PONV: none     Anesthetic complications: None          Last vitals:  Vitals:    01/28/19 1012 01/28/19 1020 01/28/19 1030   BP: 109/60 104/61 110/53   Pulse: 61 60 61   Resp: 13 18 15   SpO2: 92% 95% 95%         Electronically Signed By: Mo Luevano MD  January 28, 2019  12:40 PM

## 2019-01-28 NOTE — OR NURSING
Called edvin regarding pain stimulator.  Rep stated to place cautery pad on thigh and if possible, to not use cautery unless absolutely necessary.  Informed MD and procedural nurse of recommendation.  Will continue to monitor.

## 2019-05-15 NOTE — TELEPHONE ENCOUNTER
[facility] records received.   Holter report from 4-13-19 noted Artrial genet./flutter, average HR 59, range  bpm  IVCD  Occasional pauses, longest pause lasting  3.46 sec,  Rare PVC.  Results sent to scan.

## 2019-05-30 NOTE — PROGRESS NOTES
HPI and Plan:   See dictation [id number]        Encounter Diagnosis   Name Primary?     Hospital discharge follow-up        CURRENT MEDICATIONS:  Current Outpatient Medications   Medication Sig Dispense Refill     Acetaminophen (TYLENOL PO) Take 500 mg by mouth 2 times daily as needed for mild pain or fever        Albuterol Sulfate (VENTOLIN IN) Inhale 1-2 puffs into the lungs every 6 hours as needed        alcohol swab prep pads Use to swab area of injection/julio cesar as directed. 100 each 3     ALPRAZolam (XANAX) 0.5 MG tablet Take 0.5 mg by mouth daily as needed        atorvastatin (LIPITOR) 40 MG tablet Take 1 tablet (40 mg) by mouth every evening 90 tablet 0     baclofen (LIORESAL) 20 MG tablet Take 20 mg by mouth 3 times daily.       blood glucose calibration (NO BRAND SPECIFIED) solution Use to calibrate blood glucose monitor as needed as directed. 1 Bottle 3     blood glucose monitoring (NO BRAND SPECIFIED) meter device kit Use to test blood sugar once daily in the morning 1 kit 0     blood glucose monitoring (NO BRAND SPECIFIED) test strip Use to test blood sugar once daily in the morning before breakfast 100 strip 6     cetirizine (ZYRTEC) 10 MG tablet Take 10 mg by mouth daily       diclofenac (VOLTAREN) 1 % GEL Apply topically daily as needed for moderate pain (Apply to back)        DULoxetine (CYMBALTA) 30 MG capsule Take 60 mg by mouth 2 times daily        enoxaparin (LOVENOX) 80 MG/0.8ML syringe Inject 80 mg Subcutaneous       Furosemide (LASIX PO) Take 20 mg by mouth daily       HYDROcodone-acetaminophen (NORCO) 7.5-325 MG per tablet Take 1 tablet by mouth 3 times daily       hydrocortisone 1 % CREA cream Apply topically 2 times daily as needed for other (to face)       metFORMIN (GLUCOPHAGE) 500 MG tablet Take 500mg twice daily for 1 week, then 500mg in AM and 1000mg in PM for 1 week, then 1000mg twice daily. 100 tablet 0     metoprolol tartrate 75 MG TABS Take 75 mg by mouth 2 times daily 180 tablet 0      multivitamin, therapeutic (THERA-VIT) TABS tablet Take 1 tablet by mouth daily       omeprazole 20 MG tablet Take 20 mg by mouth 2 times daily.       polyethylene glycol (MIRALAX/GLYCOLAX) Packet Take 1 packet by mouth daily as needed        potassium chloride SA (K-DUR/KLOR-CON M) 10 MEQ CR tablet Take 20 mEq by mouth daily       pregabalin (LYRICA) 100 MG capsule Take 150 mg by mouth 3 times daily        risperiDONE (RISPERDAL) 1 MG tablet Take 1 tablet (1 mg) by mouth At Bedtime 60 tablet      SIMETHICONE-80 PO Take 80 mg by mouth 2 times daily       thin (NO BRAND SPECIFIED) lancets Use with lanceting device. 100 each 0     tiZANidine (ZANAFLEX) 4 MG tablet Take 4 mg by mouth 2 times daily as needed        WARFARIN SODIUM PO Take by mouth daily M and Fri 2.5mg, all other days 5mg         ALLERGIES     Allergies   Allergen Reactions     Ace Inhibitors Other (See Comments)     Elevated creatinine levels     Morphine Sulfate Nausea and Vomiting, Itching and Difficulty breathing     Pollen Extract      Other reaction(s): Runny Nose  dust       PAST MEDICAL HISTORY:  Past Medical History:   Diagnosis Date     Anxiety and depression      Atrial flutter (H)     s/p Ablation of cavo-tricuspid isthmus for atrial flutter 4/2015     Brachial plexitis      Cerebral infarction (H)      CVA (cerebral vascular accident) (H)     s/p thrombectomy of right middle cerebral artery, patent internal carotid 7/28/16     DM2 (diabetes mellitus, type 2) (H)      DVT (deep venous thrombosis) (H)     2016, 2012     Face pain      Headache(784.0)      Hearing loss      Heart burn      Hypertension      Itchy eyes      NSTEMI (non-ST elevated myocardial infarction) (H)     cardiac cath 7/25/18: Minimal nonocclusive coronary artery disease     Paranoid schizophrenia (H)      PE (pulmonary thromboembolism) (H)     2012     Peripheral edema      Persistent atrial fibrillation (H)      Seasonal allergies      Sinus pain      Sleep apnea       Snoring      Stress-induced cardiomyopathy     Minimal nonocclusive coronary artery disease     Stuffy nose      Weakness      Wound healing, delayed        PAST SURGICAL HISTORY:  Past Surgical History:   Procedure Laterality Date     ABDOMEN SURGERY      exploratory laparotomy (twisted bowel)     BACK SURGERY      cervical fusion (c5-7)     BACK SURGERY      lumbar fusion (L5-S1)     BREAST SURGERY  1994    Breast reduction     COLONOSCOPY N/A 1/28/2019    Procedure: COLONOSCOPY;  Surgeon: Julieta Ramos MD;  Location:  GI     ESOPHAGOSCOPY, GASTROSCOPY, DUODENOSCOPY (EGD), COMBINED N/A 1/28/2019    Procedure: COMBINED ESOPHAGOSCOPY, GASTROSCOPY, DUODENOSCOPY (EGD), BIOPSY SINGLE OR MULTIPLE;  Surgeon: Julieta Ramos MD;  Location:  GI     GYN SURGERY      hysterectomy     Hysterectomy[  2002     ORTHOPEDIC SURGERY  1999    Left Knee tear     ORTHOPEDIC SURGERY      bilateral knee replacement     OTHER SURGICAL HISTORY      cardiac cath 7/25/18: Minimal nonocclusive coronary artery disease     OTHER SURGICAL HISTORY      Ablation of cavo-tricuspid isthmus for atrial flutter 4/2015       FAMILY HISTORY:  No family history on file.    SOCIAL HISTORY:  Social History     Socioeconomic History     Marital status:      Spouse name: None     Number of children: None     Years of education: None     Highest education level: None   Occupational History     None   Social Needs     Financial resource strain: None     Food insecurity:     Worry: None     Inability: None     Transportation needs:     Medical: None     Non-medical: None   Tobacco Use     Smoking status: Never Smoker     Smokeless tobacco: Never Used   Substance and Sexual Activity     Alcohol use: Yes     Comment: Hardly ever     Drug use: No     Sexual activity: None   Lifestyle     Physical activity:     Days per week: None     Minutes per session: None     Stress: None   Relationships     Social connections:     Talks on phone: None  "    Gets together: None     Attends Shinto service: None     Active member of club or organization: None     Attends meetings of clubs or organizations: None     Relationship status: None     Intimate partner violence:     Fear of current or ex partner: None     Emotionally abused: None     Physically abused: None     Forced sexual activity: None   Other Topics Concern     Parent/sibling w/ CABG, MI or angioplasty before 65F 55M? Not Asked   Social History Narrative     None       Review of Systems:  Skin:          Eyes:         ENT:         Respiratory:          Cardiovascular:         Gastroenterology:        Genitourinary:         Musculoskeletal:         Neurologic:         Psychiatric:         Heme/Lymph/Imm:         Endocrine:           Physical Exam:  Vitals: Ht 1.6 m (5' 3\")   Wt 109.6 kg (241 lb 11.2 oz)   BMI 42.82 kg/m      Constitutional:  cooperative, alert and oriented, well developed, well nourished, in no acute distress        Skin:  warm and dry to the touch          Head:  normocephalic        Eyes:  sclera white        Lymph:      ENT:           Neck:           Respiratory:  normal breath sounds, clear to auscultation, normal A-P diameter, normal symmetry, normal respiratory excursion, no use of accessory muscles         Cardiac: regular rhythm, normal S1/S2, no S3 or S4, apical impulse not displaced, no murmurs, gallops or rubs                                                         GI:           Extremities and Muscular Skeletal:                 Neurological:  no gross motor deficits;affect appropriate        Psych:  Alert and Oriented x 3;affect appropriate, oriented to time, person and place          CC  Rebecca Beck MD  [id number]  [place] [id number]                  "

## 2019-05-30 NOTE — PROGRESS NOTES
Service Date: 05/30/2019      CARDIOLOGY FOLLOWUP       PATIENT HISTORY:  Ms. Samira Cullen is now 64 years old and was seen in followup at [facility] in [place].  I met her during 07/2018 when she was admitted to [facility] with ongoing chest discomfort.      Ms. Bang Cullen had a small troponin rise to 1.6 and her EKG was abnormal but more consistent with a myopathic process, demonstrating diffuse prolongation of the QT interval with T-wave inversion.  At that time, she was on warfarin for history of deep venous thrombosis and prior pulmonary embolism.  She underwent coronary angiography which failed to demonstrate obstructive disease.  An echocardiogram was done and it demonstrated an ejection fraction of 35%-40% with distal septal, distal apical and distal left ventricular severe hypokinesis.  The impression was of a stress or takotsubo cardiomyopathy.  Prior to her admission, she had fallen out of bed and been unable to arise by herself from the floor until the morning.  With trevin, there was enough light that she could see her purse and call for assistance once she found her phone which was in her purse.       She has subsequently been seen by Dr. Guevara Lawson at the [facility] in [place].  He summarized the history described above and noted that followup could be arranged on an as-needed basis.  Subsequently, she has also seen Dr. Metz through the [facility].  That appointment was last month.  A followup echocardiogram was ordered and the left ventricular ejection fraction was described as 50%-55% without regional wall motion abnormalities or clinically significant valvular disease.  Ms. Bang Cullen notes that she had not realized she has seen several cardiologists for followup within the last year.      She notes that she is doing well.  She denies chest discomfort, shortness of breath or palpitations.  Her history is somewhat more  complex in that she has a history of atrial fibrillation/flutter.  Dr. Julio Vieyra performed an atrial flutter ablation during 04/2015.  An echocardiogram performed at that time demonstrated normal left ventricular systolic performance.  During 07/2016, she was hospitalized at [facility] in [place] after transfer from [place], [place].  Her troponins were mildly elevated and there was an inferior and inferoseptal wall motion abnormality with a mild-to-moderate decrease in the LV systolic performance.  She was noted to be in atrial fibrillation with a rapid ventricular response rate.  Her rate was slowed with diltiazem and during that hospitalization, she was also treated for a small-bowel obstruction (the result of prior adhesions).  Additionally, she was on methadone for chronic pain management and the plan was to wean the methadone.  During her hospitalization, she experienced an acute stroke with left hemiparesis.  It was thought to be cardioembolic as a result of atrial fibrillation and a subtherapeutic INR.  As a result, cerebral angiography was performed and demonstrated a complete occlusion of the distal M1 segment of the right MCA.  Mechanical thrombectomy and recanalization resulted in symptomatic improvement of her hemiparesis.  She remains on anticoagulation for both her history of atrial fibrillation and her history of DVT and pulmonary embolism.      She has chronic lower extremity edema and has previously been suspected to have a prior cellulitis.  She has not had significant problems with leg edema recently.      At the time of her hospitalization, she did not have a permanent residence and she was not driving.  Since that time, she has actually moved into a senior residence in [place].  She is now driving and has obtained a car after 2 years of being without her own transportation.  She has paranoid schizophrenia and hallucinations but has not wanted to treat them in the past because of the  side effects of the therapy (Risperdal has been recommended).      She also has diabetes mellitus.  She is on metformin.  She has a history of hypertension and continues on metoprolol tartrate 75 mg twice daily.      She has previously undergone a venous competency ultrasound through the [facility] system.  That study performed only about 10 days ago demonstrated right popliteal deep venous incompetence, right greater saphenous vein incompetence throughout its entirety with associated varicosities in the proximal calf and left greater saphenous vein incompetence from the mid thigh to the proximal calf and extending to the distal calf.  Followup with [facility] had been planned.  A 48-hour Holter monitor done through the [facility] system had demonstrated atrial fibrillation/flutter with an average heart rate of 59 beats per minute and a range of  beats per minute.  An interventricular conduction delay was noted with occasional pauses with the longest being 3.46 seconds.  As a result, the plan was to decrease her metoprolol.      She also has a history of sleep apnea.  She has declined to use CPAP.  She has a repeat sleep study ordered and is planning to complete that sleep study.      Once her medications were reviewed with her, it became clear that she is not taking metoprolol any longer.  Her dose had been reduced to 25 mg twice daily by Dr. Metz.  Once her medications were reviewed, it became clear that she was no longer on metoprolol 75 mg twice daily but is appropriately (and recommended by Dr. Metz) taking metoprolol at 25 mg twice daily.      PHYSICAL EXAMINATION:   GENERAL:  This is a woman in no apparent distress.  She was alert and oriented to person, place and time.   VITAL SIGNS:  The blood pressure was 136/80 mmHg, heart rate 66 beats per minute and irregular and respiratory rate 14-18 per minute.   CHEST:  Clear of crackles or wheezes with a mild and diffuse decrease in breath sounds  throughout.   CARDIAC:  On cardiac auscultation, there was an S1 and an S2 without extra sounds or murmur.  The rhythm was irregular.      ASSESSMENT AND RECOMMENDATIONS:  At this time, Ms. Bang Cullen is actually doing quite well.  She moves easily and her blood pressure is controlled.  She is not short of breath or experiencing anginal symptoms.  Dr. Metz has repeated an echocardiogram and Ms. Bang Cullen has been demonstrated to have low-normal left ventricular systolic performance.      Ms. Bang Cullen additionally has not had evidence of obstructive coronary disease based on her echocardiogram done 1 year ago.  She is on appropriate risk factor intervention with beta blockade and statin therapy.  She is not on aspirin, given that she is on warfarin anticoagulation and there is not a definitive need for aspirin.      With regards to her history of atrial fibrillation, she has appropriate rate control on physical examination.  Dr. Metz has adjusted her AV leandra blockade appropriately based on recent ambulatory telemonitoring.      As such, I have recommended that Ms. Bang Cullen return in 1 year if needed.  I assured her that Dr. Metz has addressed all her problems and is following her issues very appropriately.  I have arranged for an appointment 1 year and for and DIXON visit at 6 months in case she has additional questions.  She is now living in [place] and so this would be the closest clinic for her to visit if she needs to find another provider.  I told her that any of the cardiologists who have seen her recently would be very pleased to provide that care.  I have also recommended that if she has any concerns, she contact us in the interim.         JIM BONILLA MD, [facility]             D: 2019   T: 2019   MT: BASILIA      Name:     KARLA ANDRADE   MRN:      [id number]        Account:      [id number]   :      1955           Service Date: 2019      Document: [id number]

## 2019-05-30 NOTE — LETTER
5/30/2019    [facility]  [address]  [place] [address]    RE: Samira Cullen       Dear Colleague,    I had the pleasure of seeing Samira Cullen in the [facility].    HPI and Plan:   See dictation [id number]        Encounter Diagnosis   Name Primary?     Hospital discharge follow-up        CURRENT MEDICATIONS:  Current Outpatient Medications   Medication Sig Dispense Refill     Acetaminophen (TYLENOL PO) Take 500 mg by mouth 2 times daily as needed for mild pain or fever        Albuterol Sulfate (VENTOLIN IN) Inhale 1-2 puffs into the lungs every 6 hours as needed        alcohol swab prep pads Use to swab area of injection/julio cesar as directed. 100 each 3     ALPRAZolam (XANAX) 0.5 MG tablet Take 0.5 mg by mouth daily as needed        atorvastatin (LIPITOR) 40 MG tablet Take 1 tablet (40 mg) by mouth every evening 90 tablet 0     baclofen (LIORESAL) 20 MG tablet Take 20 mg by mouth 3 times daily.       blood glucose calibration (NO BRAND SPECIFIED) solution Use to calibrate blood glucose monitor as needed as directed. 1 Bottle 3     blood glucose monitoring (NO BRAND SPECIFIED) meter device kit Use to test blood sugar once daily in the morning 1 kit 0     blood glucose monitoring (NO BRAND SPECIFIED) test strip Use to test blood sugar once daily in the morning before breakfast 100 strip 6     cetirizine (ZYRTEC) 10 MG tablet Take 10 mg by mouth daily       diclofenac (VOLTAREN) 1 % GEL Apply topically daily as needed for moderate pain (Apply to back)        DULoxetine (CYMBALTA) 30 MG capsule Take 60 mg by mouth 2 times daily        enoxaparin (LOVENOX) 80 MG/0.8ML syringe Inject 80 mg Subcutaneous       Furosemide (LASIX PO) Take 20 mg by mouth daily       HYDROcodone-acetaminophen (NORCO) 7.5-325 MG per tablet Take 1 tablet by mouth 3 times daily       hydrocortisone 1 % CREA cream Apply topically 2 times daily as needed for  other (to face)       metFORMIN (GLUCOPHAGE) 500 MG tablet Take 500mg twice daily for 1 week, then 500mg in AM and 1000mg in PM for 1 week, then 1000mg twice daily. 100 tablet 0     metoprolol tartrate 75 MG TABS Take 75 mg by mouth 2 times daily 180 tablet 0     multivitamin, therapeutic (THERA-VIT) TABS tablet Take 1 tablet by mouth daily       omeprazole 20 MG tablet Take 20 mg by mouth 2 times daily.       polyethylene glycol (MIRALAX/GLYCOLAX) Packet Take 1 packet by mouth daily as needed        potassium chloride SA (K-DUR/KLOR-CON M) 10 MEQ CR tablet Take 20 mEq by mouth daily       pregabalin (LYRICA) 100 MG capsule Take 150 mg by mouth 3 times daily        risperiDONE (RISPERDAL) 1 MG tablet Take 1 tablet (1 mg) by mouth At Bedtime 60 tablet      SIMETHICONE-80 PO Take 80 mg by mouth 2 times daily       thin (NO BRAND SPECIFIED) lancets Use with lanceting device. 100 each 0     tiZANidine (ZANAFLEX) 4 MG tablet Take 4 mg by mouth 2 times daily as needed        WARFARIN SODIUM PO Take by mouth daily M and Fri 2.5mg, all other days 5mg         ALLERGIES     Allergies   Allergen Reactions     Ace Inhibitors Other (See Comments)     Elevated creatinine levels     Morphine Sulfate Nausea and Vomiting, Itching and Difficulty breathing     Pollen Extract      Other reaction(s): Runny Nose  dust       PAST MEDICAL HISTORY:  Past Medical History:   Diagnosis Date     Anxiety and depression      Atrial flutter (H)     s/p Ablation of cavo-tricuspid isthmus for atrial flutter 4/2015     Brachial plexitis      Cerebral infarction (H)      CVA (cerebral vascular accident) (H)     s/p thrombectomy of right middle cerebral artery, patent internal carotid 7/28/16     DM2 (diabetes mellitus, type 2) (H)      DVT (deep venous thrombosis) (H)     2016, 2012     Face pain      Headache(784.0)      Hearing loss      Heart burn      Hypertension      Itchy eyes      NSTEMI (non-ST elevated myocardial infarction) (H)     cardiac  cath 7/25/18: Minimal nonocclusive coronary artery disease     Paranoid schizophrenia (H)      PE (pulmonary thromboembolism) (H)     2012     Peripheral edema      Persistent atrial fibrillation (H)      Seasonal allergies      Sinus pain      Sleep apnea      Snoring      Stress-induced cardiomyopathy     Minimal nonocclusive coronary artery disease     Stuffy nose      Weakness      Wound healing, delayed        PAST SURGICAL HISTORY:  Past Surgical History:   Procedure Laterality Date     ABDOMEN SURGERY      exploratory laparotomy (twisted bowel)     BACK SURGERY      cervical fusion (c5-7)     BACK SURGERY      lumbar fusion (L5-S1)     BREAST SURGERY  1994    Breast reduction     COLONOSCOPY N/A 1/28/2019    Procedure: COLONOSCOPY;  Surgeon: Julieta Ramos MD;  Location:  GI     ESOPHAGOSCOPY, GASTROSCOPY, DUODENOSCOPY (EGD), COMBINED N/A 1/28/2019    Procedure: COMBINED ESOPHAGOSCOPY, GASTROSCOPY, DUODENOSCOPY (EGD), BIOPSY SINGLE OR MULTIPLE;  Surgeon: Julieta Ramos MD;  Location:  GI     GYN SURGERY      hysterectomy     Hysterectomy[  2002     ORTHOPEDIC SURGERY  1999    Left Knee tear     ORTHOPEDIC SURGERY      bilateral knee replacement     OTHER SURGICAL HISTORY      cardiac cath 7/25/18: Minimal nonocclusive coronary artery disease     OTHER SURGICAL HISTORY      Ablation of cavo-tricuspid isthmus for atrial flutter 4/2015       FAMILY HISTORY:  No family history on file.    SOCIAL HISTORY:  Social History     Socioeconomic History     Marital status:      Spouse name: None     Number of children: None     Years of education: None     Highest education level: None   Occupational History     None   Social Needs     Financial resource strain: None     Food insecurity:     Worry: None     Inability: None     Transportation needs:     Medical: None     Non-medical: None   Tobacco Use     Smoking status: Never Smoker     Smokeless tobacco: Never Used   Substance and Sexual  "Activity     Alcohol use: Yes     Comment: Hardly ever     Drug use: No     Sexual activity: None   Lifestyle     Physical activity:     Days per week: None     Minutes per session: None     Stress: None   Relationships     Social connections:     Talks on phone: None     Gets together: None     Attends Buddhist service: None     Active member of club or organization: None     Attends meetings of clubs or organizations: None     Relationship status: None     Intimate partner violence:     Fear of current or ex partner: None     Emotionally abused: None     Physically abused: None     Forced sexual activity: None   Other Topics Concern     Parent/sibling w/ CABG, MI or angioplasty before 65F 55M? Not Asked   Social History Narrative     None       Review of Systems:  Skin:          Eyes:         ENT:         Respiratory:          Cardiovascular:         Gastroenterology:        Genitourinary:         Musculoskeletal:         Neurologic:         Psychiatric:         Heme/Lymph/Imm:         Endocrine:           Physical Exam:  Vitals: Ht 1.6 m (5' 3\")   Wt 109.6 kg (241 lb 11.2 oz)   BMI 42.82 kg/m       Constitutional:  cooperative, alert and oriented, well developed, well nourished, in no acute distress        Skin:  warm and dry to the touch          Head:  normocephalic        Eyes:  sclera white        Lymph:      ENT:           Neck:           Respiratory:  normal breath sounds, clear to auscultation, normal A-P diameter, normal symmetry, normal respiratory excursion, no use of accessory muscles         Cardiac: regular rhythm, normal S1/S2, no S3 or S4, apical impulse not displaced, no murmurs, gallops or rubs                                                         GI:           Extremities and Muscular Skeletal:                 Neurological:  no gross motor deficits;affect appropriate        Psych:  Alert and Oriented x 3;affect appropriate, oriented to time, person and place          CC  Rebecca Beck, " MD  [address]  [place] [id number]                    Thank you for allowing me to participate in the care of your patient.      Sincerely,     Rebecca Beck MD     [facility]    cc:   Rebecca Beck MD  [address]  [place] [id number]

## 2019-05-30 NOTE — LETTER
5/30/2019      [facility]  [address]  [facility] [address]      RE: Samira Cullen       Dear Colleague,    I had the pleasure of seeing Samira Cullen in the [facility].    Service Date: 05/30/2019      CARDIOLOGY FOLLOWUP       PATIENT HISTORY:  Ms. Samira Cullen is now 64 years old and was seen in followup at [facility] in [place].  I met her during 07/2018 when she was admitted to [facility] with ongoing chest discomfort.      Ms. Bang Cullen had a small troponin rise to 1.6 and her EKG was abnormal but more consistent with a myopathic process, demonstrating diffuse prolongation of the QT interval with T-wave inversion.  At that time, she was on warfarin for history of deep venous thrombosis and prior pulmonary embolism.  She underwent coronary angiography which failed to demonstrate obstructive disease.  An echocardiogram was done and it demonstrated an ejection fraction of 35%-40% with distal septal, distal apical and distal left ventricular severe hypokinesis.  The impression was of a stress or takotsubo cardiomyopathy.  Prior to her admission, she had fallen out of bed and been unable to arise by herself from the floor until the morning.  With trevin, there was enough light that she could see her purse and call for assistance once she found her phone which was in her purse.       She has subsequently been seen by Dr. Guevara Lawson at the [facility] in [place].  He summarized the history described above and noted that followup could be arranged on an as-needed basis.  Subsequently, she has also seen Dr. Metz through the [facility] [place].  That appointment was last month.  A followup echocardiogram was ordered and the left ventricular ejection fraction was described as 50%-55% without regional wall motion abnormalities or clinically significant  valvular disease.  Ms. Bang Cullen notes that she had not realized she has seen several cardiologists for followup within the last year.      She notes that she is doing well.  She denies chest discomfort, shortness of breath or palpitations.  Her history is somewhat more complex in that she has a history of atrial fibrillation/flutter.  Dr. Julio Vieyra performed an atrial flutter ablation during 04/2015.  An echocardiogram performed at that time demonstrated normal left ventricular systolic performance.  During 07/2016, she was hospitalized at [facility] in [place] after transfer from [place], [place].  Her troponins were mildly elevated and there was an inferior and inferoseptal wall motion abnormality with a mild-to-moderate decrease in the LV systolic performance.  She was noted to be in atrial fibrillation with a rapid ventricular response rate.  Her rate was slowed with diltiazem and during that hospitalization, she was also treated for a small-bowel obstruction (the result of prior adhesions).  Additionally, she was on methadone for chronic pain management and the plan was to wean the methadone.  During her hospitalization, she experienced an acute stroke with left hemiparesis.  It was thought to be cardioembolic as a result of atrial fibrillation and a subtherapeutic INR.  As a result, cerebral angiography was performed and demonstrated a complete occlusion of the distal M1 segment of the right MCA.  Mechanical thrombectomy and recanalization resulted in symptomatic improvement of her hemiparesis.  She remains on anticoagulation for both her history of atrial fibrillation and her history of DVT and pulmonary embolism.      She has chronic lower extremity edema and has previously been suspected to have a prior cellulitis.  She has not had significant problems with leg edema recently.      At the time of her hospitalization, she did not have a permanent residence and she was not driving.  Since that  time, she has actually moved into a senior residence in [place].  She is now driving and has obtained a car after 2 years of being without her own transportation.  She has paranoid schizophrenia and hallucinations but has not wanted to treat them in the past because of the side effects of the therapy (Risperdal has been recommended).      She also has diabetes mellitus.  She is on metformin.  She has a history of hypertension and continues on metoprolol tartrate 75 mg twice daily.      She has previously undergone a venous competency ultrasound through the [facility] system.  That study performed only about 10 days ago demonstrated right popliteal deep venous incompetence, right greater saphenous vein incompetence throughout its entirety with associated varicosities in the proximal calf and left greater saphenous vein incompetence from the mid thigh to the proximal calf and extending to the distal calf.  Followup with [facility] had been planned.  A 48-hour Holter monitor done through the [facility] system had demonstrated atrial fibrillation/flutter with an average heart rate of 59 beats per minute and a range of  beats per minute.  An interventricular conduction delay was noted with occasional pauses with the longest being 3.46 seconds.  As a result, the plan was to decrease her metoprolol.      She also has a history of sleep apnea.  She has declined to use CPAP.  She has a repeat sleep study ordered and is planning to complete that sleep study.      Once her medications were reviewed with her, it became clear that she is not taking metoprolol any longer.  Her dose had been reduced to 25 mg twice daily by Dr. Metz.  Once her medications were reviewed, it became clear that she was no longer on metoprolol 75 mg twice daily but is appropriately (and recommended by Dr. Metz) taking metoprolol at 25 mg twice daily.      PHYSICAL EXAMINATION:   GENERAL:  This is a woman in no apparent distress.  She was alert  and oriented to person, place and time.   VITAL SIGNS:  The blood pressure was 136/80 mmHg, heart rate 66 beats per minute and irregular and respiratory rate 14-18 per minute.   CHEST:  Clear of crackles or wheezes with a mild and diffuse decrease in breath sounds throughout.   CARDIAC:  On cardiac auscultation, there was an S1 and an S2 without extra sounds or murmur.  The rhythm was irregular.      ASSESSMENT AND RECOMMENDATIONS:  At this time, Ms. Bang Cullen is actually doing quite well.  She moves easily and her blood pressure is controlled.  She is not short of breath or experiencing anginal symptoms.  Dr. Metz has repeated an echocardiogram and Ms. Bang Cullen has been demonstrated to have low-normal left ventricular systolic performance.      Ms. Bang Cullen additionally has not had evidence of obstructive coronary disease based on her echocardiogram done 1 year ago.  She is on appropriate risk factor intervention with beta blockade and statin therapy.  She is not on aspirin, given that she is on warfarin anticoagulation and there is not a definitive need for aspirin.      With regards to her history of atrial fibrillation, she has appropriate rate control on physical examination.  Dr. Metz has adjusted her AV leandra blockade appropriately based on recent ambulatory telemonitoring.      As such, I have recommended that Ms. Bang Cullen return in 1 year if needed.  I assured her that Dr. Metz has addressed all her problems and is following her issues very appropriately.  I have arranged for an appointment 1 year and for and DIXON visit at 6 months in case she has additional questions.  She is now living in [place] and so this would be the closest clinic for her to visit if she needs to find another provider.  I told her that any of the cardiologists who have seen her recently would be very pleased to provide that care.  I have also recommended that if she has any concerns, she contact us in the interim.          JIM BONILLA MD, [facility]             D: 2019   T: 2019   MT: DW      Name:     KARLA ANDRADE   MRN:      [id number]        Account:      [id number]   :      1955           Service Date: 2019      Document: [id number]         Outpatient Encounter Medications as of 2019   Medication Sig Dispense Refill     Acetaminophen (TYLENOL PO) Take 500 mg by mouth 2 times daily as needed for mild pain or fever        Albuterol Sulfate (VENTOLIN IN) Inhale 1-2 puffs into the lungs every 6 hours as needed        ALPRAZolam (XANAX) 0.5 MG tablet Take 0.5 mg by mouth daily as needed        atorvastatin (LIPITOR) 40 MG tablet Take 1 tablet (40 mg) by mouth every evening 90 tablet 0     baclofen (LIORESAL) 20 MG tablet Take 20 mg by mouth 3 times daily.       blood glucose monitoring (NO BRAND SPECIFIED) test strip Use to test blood sugar once daily in the morning before breakfast 100 strip 6     cetirizine (ZYRTEC) 10 MG tablet Take 10 mg by mouth daily       diclofenac (VOLTAREN) 1 % GEL Apply topically daily as needed for moderate pain (Apply to back)        DULoxetine (CYMBALTA) 30 MG capsule Take 60 mg by mouth 2 times daily        Furosemide (LASIX PO) Take 20 mg by mouth daily as needed        HYDROcodone-acetaminophen (NORCO) 7.5-325 MG per tablet Take 1 tablet by mouth 3 times daily       hydrocortisone 1 % CREA cream Apply topically 2 times daily as needed for other (to face)       metFORMIN (GLUCOPHAGE-XR) 500 MG 24 hr tablet Take 500 mg by mouth 3 times daily  3     metoprolol tartrate (LOPRESSOR) 25 MG tablet Take 25 mg by mouth 2 times daily       multivitamin, therapeutic (THERA-VIT) TABS tablet Take 1 tablet by mouth daily       omeprazole 20 MG tablet Take 20 mg by mouth 2 times daily.       polyethylene glycol (MIRALAX/GLYCOLAX) Packet Take 1 packet by mouth daily as needed        potassium chloride SA (K-DUR/KLOR-CON M) 10 MEQ CR tablet Take 20 mEq by mouth daily        pregabalin (LYRICA) 100 MG capsule Take 200 mg by mouth 3 times daily        SIMETHICONE-80 PO Take 80 mg by mouth 2 times daily       thin (NO BRAND SPECIFIED) lancets Use with lanceting device. 100 each 0     tiZANidine (ZANAFLEX) 4 MG tablet Take 4 mg by mouth 2 times daily as needed        WARFARIN SODIUM PO Take by mouth daily M and Fri 2.5mg, all other days 5mg       alcohol swab prep pads Use to swab area of injection/julio cesar as directed. 100 each 3     blood glucose calibration (NO BRAND SPECIFIED) solution Use to calibrate blood glucose monitor as needed as directed. 1 Bottle 3     blood glucose monitoring (NO BRAND SPECIFIED) meter device kit Use to test blood sugar once daily in the morning 1 kit 0     enoxaparin (LOVENOX) 80 MG/0.8ML syringe Inject 80 mg Subcutaneous       metFORMIN (GLUCOPHAGE) 500 MG tablet Take 500mg twice daily for 1 week, then 500mg in AM and 1000mg in PM for 1 week, then 1000mg twice daily. (Patient not taking: Reported on 5/30/2019) 100 tablet 0     metoprolol tartrate 75 MG TABS Take 75 mg by mouth 2 times daily (Patient not taking: Reported on 5/30/2019) 180 tablet 0     risperiDONE (RISPERDAL) 1 MG tablet Take 1 tablet (1 mg) by mouth At Bedtime (Patient not taking: Reported on 5/30/2019) 60 tablet      [DISCONTINUED] metFORMIN (GLUCOPHAGE-XR) 500 MG 24 hr tablet Take 3 tabs daily with evening meal. In 1-2 weeks increase to 4 tabs daily.       No facility-administered encounter medications on file as of 5/30/2019.        Again, thank you for allowing me to participate in the care of your patient.      Sincerely,    Rebecca Beck MD     [facility]

## 2019-06-24 NOTE — TELEPHONE ENCOUNTER
S:  called about 64 yr old female in [facility] ED for Paranoia and delusions    B:  report pt was bib EMS after due to worries about her mental health. Pt has been calling police for several complaints regarding neighbors harming a child and babies crying, pt lives in ages [address]+ apartment.  reports pt delusions are well established and MH is decompensating . Pt has hx of chronic back pain receives pain management from [facility] and PCP.  report pt doesn't seem to have OP services for MH. Pt was hx of several  ER visits for agitation, confusion, paranoid schizophrenia. Pt lives alone,  reports pt doesn't have cognitive delays or hx of dementia   Pt given Zyprexa in ED     A: [place]; Labs not order as of 7:00am;     R: Pt placed on wait list -awaiting in ED for appropriate bed

## 2019-06-24 NOTE — ED NOTES
"Dr. Wilcox and writer in to discuss plan of care with patient. Patient states she is not interested in going to inpatient psych. Patient states \"I'll just not say anything to anyone\". States she doesn't want to talk to a therapist even in an outpatient setting. MD discussed importance of honest and open communication to facilitate the best mental health care possible. Patient placed on a 72 hours hold and given copy of patient rights. Patient not combative. Continues to state she does not want to be admitted.   "

## 2019-06-24 NOTE — ED PROVIDER NOTES
Emergency Department Patient Sign-out       Brief HPI:  This is a 64 year old female signed out to me by Dr. Claudio .  See initial ED Provider note for details of the presentation.          Patient is medically cleared for admission to a Behavioral Health unit.      The patient is on a hold.  The type of hold is ALEJANDRO.      The patient has not required medication for agitation.  She did receive PO Zyprexa 5 mg.    Awaiting Behavioral Health Assessment (DEC)        Significant Events prior to my assuming care: None      Exam:   Patient Vitals for the past 24 hrs:   BP Temp Temp src Pulse Heart Rate Resp SpO2 Weight   06/24/19 0839 124/83 -- -- 92 -- -- 99 % --   06/24/19 0345 178/87 -- -- 101 -- -- 100 % --   06/24/19 0340 -- -- -- -- -- -- -- 109.3 kg (241 lb)   06/24/19 0228 (!) 150/118 98.4  F (36.9  C) Oral -- 110 20 100 % --           ED RESULTS:   Results for orders placed or performed during the hospital encounter of 06/24/19 (from the past 24 hour(s))   Drug abuse screen 77 urine (FL, RH, SH)     Status: Abnormal    Collection Time: 06/24/19  8:42 AM   Result Value Ref Range    Amphetamine Qual Urine Negative NEG^Negative    Barbiturates Qual Urine Negative NEG^Negative    Benzodiazepine Qual Urine Negative NEG^Negative    Cannabinoids Qual Urine Negative NEG^Negative    Cocaine Qual Urine Negative NEG^Negative    Opiates Qualitative Urine Positive (A) NEG^Negative    PCP Qual Urine Negative NEG^Negative   UA with Microscopic     Status: Abnormal    Collection Time: 06/24/19  8:42 AM   Result Value Ref Range    Color Urine Light Yellow     Appearance Urine Clear     Glucose Urine 50 (A) NEG^Negative mg/dL    Bilirubin Urine Negative NEG^Negative    Ketones Urine Negative NEG^Negative mg/dL    Specific Gravity Urine 1.009 1.003 - 1.035    Blood Urine Negative NEG^Negative    pH Urine 6.0 5.0 - 7.0 pH    Protein Albumin Urine Negative NEG^Negative mg/dL    Urobilinogen mg/dL Normal 0.0 - 2.0 mg/dL     Nitrite Urine Negative NEG^Negative    Leukocyte Esterase Urine Negative NEG^Negative    Source Midstream Urine     WBC Urine 0 0 - 5 /HPF    RBC Urine 0 0 - 2 /HPF    Bacteria Urine Few (A) NEG^Negative /HPF    Squamous Epithelial /HPF Urine <1 0 - 1 /HPF    Mucous Urine Present (A) NEG^Negative /LPF   Basic metabolic panel (BMP)     Status: Abnormal    Collection Time: 06/24/19  9:16 AM   Result Value Ref Range    Sodium 141 133 - 144 mmol/L    Potassium 3.9 3.4 - 5.3 mmol/L    Chloride 105 94 - 109 mmol/L    Carbon Dioxide 32 20 - 32 mmol/L    Anion Gap 4 3 - 14 mmol/L    Glucose 162 (H) 70 - 99 mg/dL    Urea Nitrogen 13 7 - 30 mg/dL    Creatinine 0.76 0.52 - 1.04 mg/dL    GFR Estimate 81 >60 mL/min/[1.73_m2]    GFR Estimate If Black >90 >60 mL/min/[1.73_m2]    Calcium 9.3 8.5 - 10.1 mg/dL   CBC + differential     Status: None    Collection Time: 06/24/19  9:16 AM   Result Value Ref Range    WBC 4.5 4.0 - 11.0 10e9/L    RBC Count 4.33 3.8 - 5.2 10e12/L    Hemoglobin 12.8 11.7 - 15.7 g/dL    Hematocrit 39.9 35.0 - 47.0 %    MCV 92 78 - 100 fl    MCH 29.6 26.5 - 33.0 pg    MCHC 32.1 31.5 - 36.5 g/dL    RDW 14.4 10.0 - 15.0 %    Platelet Count 197 150 - 450 10e9/L    Diff Method Automated Method     % Neutrophils 48.5 %    % Lymphocytes 38.4 %    % Monocytes 10.3 %    % Eosinophils 2.2 %    % Basophils 0.4 %    % Immature Granulocytes 0.2 %    Nucleated RBCs 0 0 /100    Absolute Neutrophil 2.2 1.6 - 8.3 10e9/L    Absolute Lymphocytes 1.7 0.8 - 5.3 10e9/L    Absolute Monocytes 0.5 0.0 - 1.3 10e9/L    Absolute Eosinophils 0.1 0.0 - 0.7 10e9/L    Absolute Basophils 0.0 0.0 - 0.2 10e9/L    Abs Immature Granulocytes 0.0 0 - 0.4 10e9/L    Absolute Nucleated RBC 0.0        ED MEDICATIONS:   Medications   acetaminophen (TYLENOL) tablet 650 mg (has no administration in time range)   OLANZapine zydis (zyPREXA) ODT tab 5 mg (has no administration in time range)   LORazepam (ATIVAN) tablet 1 mg (has no administration in time  range)   OLANZapine (zyPREXA) tablet 5 mg (5 mg Oral Given 6/24/19 2852)         Impression:    ICD-10-CM    1. Delusion (H) F22 Drug abuse screen 77 urine (FL, RH, SH)     UA with Microscopic     Basic metabolic panel (BMP)     CBC + differential   2. Auditory hallucination R44.0        Plan:    6:41 AM - I spoke with DEC.  Agree that given lack of insight into delusions and hallucinations despite being on meds, will pursue admission for  and med adjustment.    10:25 AM - Labs reviewed and largely unremarkable.  Patient cleared from medical standpoint for admission to .    Patient updated on plan.      She is signed out to my partner pending  admission.    2:23 PM - Patient is unwilling to go voluntarily for  admission.  She is placed on a 72 hour hold.       MD Jeri Galeano Edgar Ronald, MD  06/24/19 1833       Alex Wilcox MD  06/24/19 0671

## 2019-06-24 NOTE — TELEPHONE ENCOUNTER
R: author called Celia Rn at 8 am for an update on pt and to ask if pt is willing to admit elsewhere. No beds on 3b and no expected discharge's currently. She said pt did well overnight, slept, and is still sleeping. She checked with the ED MD and said that they would like us to look for any available bed. francisca Cifuentes called Myrtle at [facility] who said she can review the case but has more questions for [facility] ED RN. Author asked Adela in [facility] to ask Celia RN to call [facility] at [phone]. Author requested they call back to intake with the outcome once known. francisca Cifuentes called [facility] ED at 12:47 pm and asked if they have heard whether pt was accepted or not at [facility]. They said they have not. Author then called Catherine at [facility] who said pt is too acute for their unit so she was declined francisca  Author discussed case and abnormal labs with Jose Rafael as there is now a bed on #77 at [facility]. Gus/jose rafael accepted for [facility]                Copy of pt's [place] was faxed by author to #[phone].     Author left msg at 12:59 pm for #77 Rn to call back. francisca

## 2019-06-24 NOTE — ED NOTES
Bed: ED13  Expected date: 6/24/19  Expected time: 2:20 AM  Means of arrival: Ambulance  Comments:  Margaret 67 F crisis eval
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
## 2019-06-24 NOTE — ED NOTES
Per intake, patient to transfer to station 77. [facility] station  called. Writer spoke with CLEVE Vicente. Per Cinthia, no appropriate room available at this time. [facility] staff to call when bed available.

## 2019-06-24 NOTE — PROGRESS NOTES
Cane  Blue jeans  t-shirt  Pair of black socks  Bra   Gray gym shoes  Black coat   Underwear  Black/tan purse  2x keys w/fob   Hair brush   Car key   Cell phone-iphone w/black, green purple floral case  Feminine hygiene products  Business/membership cards  [place] x2  $42.00 in locker (rest in security envelope)  Loose change med list   Glasses w/case  Various pens  Tictacs/gum  Lipstick   Black wallet  Brown wallet    Medications:  Acetaminophen pills in unmarked prescription bottle  Glucose tablets   Cold medicine packets x2  Tizanidine 4mg bottle w/various pills  Tizanidine 4mg bottle w/pills    Security envelope NO:[id number]  Checkbook #[id number]  Car title  Check $73.25  Check $7.75  Check $3.19  Check $102.66  [employer] money card  Mastercard [phone]  Visa [phone]  $120.00 cash     Admission:  I am responsible for any personal items that are not sent to the safe or pharmacy. [facility] is not responsible for loss, theft or damage of any property in my possession.        Patient Signature: ___________________________________________      Date/Time:__________________________     Staff Signature: __________________________________      Date/Time:__________________________     2nd Staff person, if patient is unable/unwilling to sign:      __________________________________________________________      Date/Time: __________________________        Discharge:  [facility] has returned all of my personal belongings:     Patient Signature: ________________________________________     Date/Time: ____________________________________     Staff Signature: ______________________________________     Date/Time:_____________________________________

## 2019-06-24 NOTE — ED TRIAGE NOTES
Patient presents from home, police were called by patient and felt she was making up events and worried about patients mental health per EMS, states police have come to residence and investigated her complaints multiple times and they appear to be delusions per EMS, VSS, A&OX4

## 2019-06-24 NOTE — PLAN OF CARE
"Patient is a direct admit from [facility] and is on a 72 Hour Hold. Appears not to be able to bear weight and difficult with ambulation-needs cane to walk. States \"Has chronic pain and not able to care for self\". Seems sad when talking about her limitations due to chronic pain and lack of support from sister. Prior to admission has beliefs of voices and that there is a sex ring operating one floor above hers in the apartment building. Denies suicidal thoughts and will contract for safety.  Nursing assessment complete including patient and medication profiles. Risk assessments completed addressing suicide,fall,skin,nutrition and safety issues. Care plan initiated. Assessments reviewed with physician and admit orders received. Video monitoring in progress, Patient Informed. Welcome packet reviewed with patient. Information reviewed includes getting emergency help, preventing infections, understanding your care, using medication safely, reducing falls, preventing pressure ulcers, smoking cessation, powerful choices and Patients Bill of Rights. Pt. given tour of the unit and instruction on use of facility including emergency call light. Program schedule reviewed with patient. Questions regarding the unit addressed. Pt. Search completed and belongings inventoried.         "

## 2019-06-25 NOTE — H&P
[facility]    History and Physical - Hospitalist Service       Date of Admission:  6/24/2019    Assessment & Plan   Samira Cullen is a 64 year old female with a complex past medical history significant for paranoid schizophrenia with hallucinations, anxiety, depression persistent atrial fibrillation, atrial flutter status post ablation, obstructive sleep apnea not on CPAP, non-insulin-dependent type 2 diabetes, history of non-ST elevation MI, stress-induced cardiomyopathy, hypertension, hyperlipidemia, history of right MCA CVA, history of DVT and pulmonary embolism, GERD, complex regional pain syndrome with chronic neck and back pain with prior fusions, mild intermittent asthma, and morbid obesity admitted on 6/24/2019 to inpatient psychiatry on a 72 hour hold for acute decompensated paranoid schizophrenia with auditory hallucinations and delusions.    Acute decompensated paranoid schizophrenia with auditory hallucinations and delusions  Anxiety, depression  Direct admission from [facility] to mental health unit at [facility] for auditory hallucinations and delusions. 72 hour hold on admit. Pt has been hearing voices and worried there is a sex ring in her apartment building.   - Adjust psychiatric medications per psychiatry    Persistent atrial fibrillation  History of atrial flutter status post ablation  Rate controlled with beta-blockade and anticoagulated with warfarin.  History of a flutter ablation 4/2015.   - No PTA aspirin she is on warfarin.  Pharmacy to dose.  - Resume PTA beta-blockade with hold parameters    Obstructive Sleep apnea  Has declined to use CPAP in the past.  - Repeat sleep study ordered by PCP, patient reports this has been scheduled and she will be completing it    Non-insulin-dependent type 2 diabetes  6/24/2019: HbA1c 7.4%.  Metformin PTA.  - Hypoglycemia protocol, fingerstick checks 4 times per day  - Resumed PTA metformin 500 mg 3 times daily  - Medium sliding scale insulin  --> Low dose sliding scale    Recent Labs   Lab 06/25/19  0808 06/25/19  0201 06/24/19  2146 06/24/19  0916   GLC  --   --   --  162*   * 155* 154*  --      History of non-ST elevation MI  Stress-induced cardiomyopathy  Hypertension, Hyperlipidemia  Normally nonocclusive coronary artery disease on cath.  Follows with Dr. Rebecca Beck, last seen 5/30/2019.   ECHO 7/2018: EF 45%.  - Follow-up with cardiology as scheduled as an outpatient  - Resumed PTA metoprolol tartrate 25 mg twice daily and atorvastatin 40 mg nightly  - PRN Lasix for LE swelling    History of Rt MCA CVA  History of DVT and pulmonary embolism   AC therapeutic, 2.9 on admission.  - Warfarin as above, pharmacy dosing  - statin as above    GERD: Resumed prior to admission omeprazole and simethicone     Complex regional pain syndrome  Chronic neck and back pain  Hx C5-7 fusion, L5-S1 fusion  Trialed neck stimulator.   - Resumed PTA norco, tizanidine, baclofen, and lyrica PRN    BMI 41 c/w Morbid obesity: Close primary care follow up for diet/lifestyle changes.     Mild intermittent asthma: albuterol nebs PRN and zyrtec daily    Despite multiple medical issues, the patient appears well compensated and medically stable. The Hospitalist service will chart check for 1-2 days to follow up on vital signs and glucose and then likely sign off.       Diet: Moderate Consistent CHO Diet    DVT Prophylaxis: Warfarin  Morris Catheter: not present  Code Status: Full Code      Disposition Plan   Expected discharge: 2 - 3 days+, recommended to prior living arrangement once cleared by psychiatry.  Entered: Terri Collins PA-C 06/25/2019, 10:29 AM     The patient's care was discussed with the Attending Physician, Dr. Cervantes, Bedside Nurse and Patient.    Terri Collins PA-C  [facility]    ______________________________________________________________________    Chief Complaint   Auditory hallucinations    History is obtained from the  patient, electronic health record and emergency department physician    History of Present Illness   Samira Cullen is a 64 year old female with a complex past medical history significant for paranoid schizophrenia with hallucinations, anxiety, depression persistent atrial fibrillation, atrial flutter status post ablation, obstructive sleep apnea not on CPAP, non-insulin-dependent type 2 diabetes, history of non-ST elevation MI, stress-induced cardiomyopathy, hypertension, hyperlipidemia, history of right MCA CVA, history of DVT and pulmonary embolism, GERD, complex regional pain syndrome with chronic neck and back pain with prior fusions, mild intermittent asthma, and morbid obesity admitted on 6/24/2019 to inpatient psychiatry on a 72 hour hold for acute decompensated paranoid schizophrenia with auditory hallucinations and delusions.    The patient was transferred from [facility] for auditory hallucinations and delusions and placed on a 72 hour hold.  In the ED she was seen and evaluated by Dr. Guevara Cha.  Initial vital signs afebrile, heart rate 110, blood pressure 150/118, breathing 100% on room air with 20 respirations per minute.  Drug screen positive for opiates, patient takes Norco PTA therefore would expect this.  UA unremarkable.  CBC and BMP within normal limits.  HbA1c is 7.4%.  TSH within normal limits at 1.28. Follows with cardiology, Dr. Rebecca Beck, last seen 5/30/2019.    During my visit the patient reports feeling well and compliant with her medications.  She is taking her Lasix most days of the week.  Checks her fingersticks but not every day.  No symptoms of hypoglycemia.  No chest pain, palpitations.  No shortness of breath.  No increased swelling to her baseline trace to 1+ pitting edema of lower extremities.      Review of Systems    The 10 point Review of Systems is negative other than noted in the HPI or here.     Past Medical History    I have reviewed this patient's medical history  and updated it with pertinent information if needed.   Past Medical History:   Diagnosis Date     Anxiety and depression      Atrial flutter (H)     s/p Ablation of cavo-tricuspid isthmus for atrial flutter 4/2015     Brachial plexitis      Cerebral infarction (H)      CVA (cerebral vascular accident) (H)     s/p thrombectomy of right middle cerebral artery, patent internal carotid 7/28/16     DM2 (diabetes mellitus, type 2) (H)      DVT (deep venous thrombosis) (H)     2016, 2012     Face pain      Headache(784.0)      Hearing loss      Heart burn      Hypertension      Itchy eyes      NSTEMI (non-ST elevated myocardial infarction) (H)     cardiac cath 7/25/18: Minimal nonocclusive coronary artery disease     Paranoid schizophrenia (H)      PE (pulmonary thromboembolism) (H)     2012     Peripheral edema      Persistent atrial fibrillation (H)      Seasonal allergies      Sinus pain      Sleep apnea      Snoring      Stress-induced cardiomyopathy     Minimal nonocclusive coronary artery disease     Stuffy nose      Weakness      Wound healing, delayed        Past Surgical History   I have reviewed this patient's surgical history and updated it with pertinent information if needed.  Past Surgical History:   Procedure Laterality Date     ABDOMEN SURGERY      exploratory laparotomy (twisted bowel)     BACK SURGERY      cervical fusion (c5-7)     BACK SURGERY      lumbar fusion (L5-S1)     BREAST SURGERY  1994    Breast reduction     COLONOSCOPY N/A 1/28/2019    Procedure: COLONOSCOPY;  Surgeon: Julieta Ramos MD;  Location:  GI     ESOPHAGOSCOPY, GASTROSCOPY, DUODENOSCOPY (EGD), COMBINED N/A 1/28/2019    Procedure: COMBINED ESOPHAGOSCOPY, GASTROSCOPY, DUODENOSCOPY (EGD), BIOPSY SINGLE OR MULTIPLE;  Surgeon: Julieta Ramos MD;  Location:  GI     GYN SURGERY      hysterectomy     Hysterectomy[  2002     ORTHOPEDIC SURGERY  1999    Left Knee tear     ORTHOPEDIC SURGERY      bilateral knee replacement      OTHER SURGICAL HISTORY      cardiac cath 7/25/18: Minimal nonocclusive coronary artery disease     OTHER SURGICAL HISTORY      Ablation of cavo-tricuspid isthmus for atrial flutter 4/2015       Social History   I have reviewed this patient's social history and updated it with pertinent information if needed.  Social History     Tobacco Use     Smoking status: Never Smoker     Smokeless tobacco: Never Used   Substance Use Topics     Alcohol use: Yes     Comment: Hardly ever     Drug use: No       Family History   I have reviewed this patient's family history and updated it with pertinent information if needed.   No family history on file.    Prior to Admission Medications   Prior to Admission Medications   Prescriptions Last Dose Informant Patient Reported? Taking?   ALPRAZolam (XANAX) 0.5 MG tablet 6/23/2019 Self Yes No   Sig: Take 0.5 mg by mouth daily as needed    Acetaminophen (TYLENOL PO) 6/23/2019 at pm Self Yes No   Sig: Take 500 mg by mouth 2 times daily as needed for mild pain or fever    Albuterol Sulfate (VENTOLIN IN) Past Week at Unknown time Self Yes Yes   Sig: Inhale 1-2 puffs into the lungs every 6 hours as needed    DULoxetine (CYMBALTA) 30 MG capsule 6/23/2019 at Unknown time Self Yes Yes   Sig: Take 60 mg by mouth 2 times daily    Furosemide (LASIX PO) 6/22/19 Self Yes No   Sig: Take 20 mg by mouth daily as needed    HYDROcodone-acetaminophen (NORCO) 7.5-325 MG per tablet 6/23/2019 at Unknown time Self Yes Yes   Sig: Take 1 tablet by mouth 3 times daily   SIMETHICONE-80 PO 6/23/2019 at Unknown time Self Yes Yes   Sig: Take 80 mg by mouth 2 times daily   WARFARIN SODIUM PO 6/22/19 Self Yes No   Sig: Take 5 mg by mouth daily    alcohol swab prep pads   No No   Sig: Use to swab area of injection/julio cesar as directed.   atorvastatin (LIPITOR) 40 MG tablet 6/22/19  No No   Sig: Take 1 tablet (40 mg) by mouth every evening   baclofen (LIORESAL) 20 MG tablet 6/23/2019 Self Yes Yes   Sig: Take 20 mg by mouth 3  times daily.   blood glucose calibration (NO BRAND SPECIFIED) solution   No No   Sig: Use to calibrate blood glucose monitor as needed as directed.   blood glucose monitoring (NO BRAND SPECIFIED) meter device kit   No No   Sig: Use to test blood sugar once daily in the morning   blood glucose monitoring (NO BRAND SPECIFIED) test strip   No No   Sig: Use to test blood sugar once daily in the morning before breakfast   cetirizine (ZYRTEC) 10 MG tablet 6/23/2019 at Unknown time Self Yes Yes   Sig: Take 10 mg by mouth daily   diclofenac (VOLTAREN) 1 % GEL Past Week at Unknown time Self Yes Yes   Sig: Apply topically daily as needed for moderate pain (Apply to back)    hydrocortisone 1 % CREA cream 6/23/2019 at Unknown time Self Yes Yes   Sig: Apply topically 2 times daily as needed for other (to face)   metFORMIN (GLUCOPHAGE-XR) 500 MG 24 hr tablet 6/23/2019 at Unknown time  Yes Yes   Sig: Take 500 mg by mouth 3 times daily   metoprolol tartrate (LOPRESSOR) 25 MG tablet 6/23/2019 at Unknown time  Yes Yes   Sig: Take 25 mg by mouth 2 times daily   multivitamin, therapeutic (THERA-VIT) TABS tablet 6/23/2019 at Unknown time Self Yes Yes   Sig: Take 1 tablet by mouth daily   omeprazole 20 MG tablet 6/23/2019 at Unknown time Self Yes Yes   Sig: Take 20 mg by mouth 2 times daily.   polyethylene glycol (MIRALAX/GLYCOLAX) Packet Past Month at Unknown time Self Yes Yes   Sig: Take 1 packet by mouth daily as needed    potassium chloride SA (K-DUR/KLOR-CON M) 10 MEQ CR tablet 6/22/19 Self Yes No   Sig: Take 20 mEq by mouth daily as needed When takes furosemide   pregabalin (LYRICA) 100 MG capsule 6/23/2019 at Unknown time Self Yes Yes   Sig: Take 200 mg by mouth 3 times daily    thin (NO BRAND SPECIFIED) lancets   No No   Sig: Use with lanceting device.   tiZANidine (ZANAFLEX) 4 MG tablet 6/23/2019 at Unknown time Self Yes Yes   Sig: Take 4 mg by mouth 2 times daily as needed       Facility-Administered Medications: None      Allergies   Allergies   Allergen Reactions     Ace Inhibitors Other (See Comments)     Elevated creatinine levels     Morphine Sulfate Nausea and Vomiting, Itching and Difficulty breathing     Pollen Extract      Other reaction(s): Runny Nose  dust       Physical Exam   Vital Signs: Temp: 97.2  F (36.2  C) Temp src: Oral BP: 118/89 Pulse: 62 Heart Rate: 62 Resp: 17 SpO2: 98 % O2 Device: None (Room air)    Weight: 238 lbs 4.8 oz    General: Awake, alert, woman who appears stated age. Looks comfortable sitting in chair. No acute distress.  HEENT: Normocephalic, atraumatic. Extraocular movements intact.   Respiratory: Clear to auscultation bilaterally, no rales, wheezing, or rhonchi.  Cardiovascular: Irregularly irregular rate and rhythm, rate ~60s, +S1 and S2, no murmur auscultated. Trace to 1+ BLE peripheral edema.   Gastrointestinal: Soft, non-tender, obese but non-distended. Bowel sounds present.  Skin: Warm, dry. No obvious rashes or lesions on exposed skin. Chronic venous stasis skin changes noted at BLE. Dorsalis pedis pulses palpable bilaterally.  Musculoskeletal: No joint swelling, erythema or tenderness. Moves all extremities equally.  Neurologic: AAO x3. Cranial nerves 2-12 grossly intact, normal strength and sensation.  Psychiatric: Appropriate mood and affect. No obvious anxiety or depression.    Data   Data reviewed today: I reviewed all medications, new labs and imaging results over the last 24 hours. I personally reviewed no images or EKG's today.    Recent Labs   Lab 06/25/19  0755 06/24/19  1758 06/24/19  0916   WBC  --   --  4.5   HGB  --   --  12.8   MCV  --   --  92   PLT  --   --  197   INR 2.44* 2.90*  --    NA  --   --  141   POTASSIUM  --   --  3.9   CHLORIDE  --   --  105   CO2  --   --  32   BUN  --   --  13   CR  --   --  0.76   ANIONGAP  --   --  4   MIRTHA  --   --  9.3   GLC  --   --  162*     No results found for this or any previous visit (from the past 24 hour(s)).

## 2019-06-25 NOTE — PROGRESS NOTES
Fall alarm criteria reviewed. This patients risk of falling outweighs potential risks with bed alarm use. Provider order obtained from Dr. Salvador. Rationale for use of alarm will be reviewed and documented every shift in which alarm is in use.    First night on unit. Patient wearing a fall risk yellow bracelet. Patient uses cane for walking.

## 2019-06-25 NOTE — PHARMACY-ANTICOAGULATION SERVICE
Clinical Pharmacy - Warfarin Dosing Consult     Pharmacy has been consulted to manage this patient s warfarin therapy.  Indication: Atrial Fibrillation  Therapy Goal: INR 2-3  Warfarin Prior to Admission: Yes  Warfarin PTA Regimen: 5 mg daily    INR   Date Value Ref Range Status   06/24/2019 2.90 (H) 0.86 - 1.14 Final   07/28/2018 2.13 (H) 0.86 - 1.14 Final       Recommend warfarin 3 mg today - patient last dose was on 6/22 and INR is 2.9, will reduce home dose for now.  Pharmacy will monitor Samira Cullen daily and order warfarin doses to achieve specified goal.      Please contact pharmacy as soon as possible if the warfarin needs to be held for a procedure or if the warfarin goals change.

## 2019-06-25 NOTE — CONSULTS
"BEHAVIORAL HEALTH NUTRITION ASSESSMENT      REASON FOR ASSESSMENT:  Nutrition Risk Indicator - New/Uncontrolled diabetes (since last July)  CURRENT DIET AND NOURISHMENT ORDER:    Information obtained from chart review    Diet: MCHO    Current Intake/Tolerance: No intake recorded on the doc flowsheet. Patient not available/not appropriate to visit with at this time. Patient ordering adequate amounts of food and balanced meals as per review of menus.    ANTHROPOMETRICS:  Height: 5' 3.25\"  Weight: 238 lbs 4.8 oz (108.1 kg)   Body mass index is 41.88 kg/m .  IBW: 52.8 kg  % IBW: 205%  Weight History:   Wt Readings from Last 10 Encounters:   06/25/19 108.1 kg (238 lb 4.8 oz)   06/24/19 109.3 kg (241 lb)   05/30/19 109.6 kg (241 lb 11.2 oz)   01/28/19 108 kg (238 lb)   07/29/18 111.1 kg (245 lb)   07/28/18 114.2 kg (251 lb 12.3 oz)   06/26/18 108.9 kg (240 lb)   05/24/18 111.1 kg (245 lb)   12/23/11 114.3 kg (252 lb)     LABS:  Reviewed    NUTRITION STATUS VALIDATION:  Weight status: Obesity Grade III BMI >40  Patient does not meet two of the following criteria necessary for diagnosing malnutrition (significant weight loss, reduced intake, subcutaneous fat loss, muscle loss or fluid retention)    INTERVENTION:    Nutrition Diagnosis:  No nutrition diagnosis at this time.    Implementation:   Nutrition education: not appropriate at this time due to patient condition    Follow Up/Monitoring:   No need for further follow-up unless another consult received.    Bailey Soto, RD, LD      "

## 2019-06-25 NOTE — PROGRESS NOTES
ASAP consult received for med red on new H&P. No acute medical concerns.  CareEverywhere chart reviewed.    Medium SSI and QID AC HS monitoring. OK to discontinue if consistently BG <180 while in inpatient psych.  Check INR. Pharmacy to dose warfarin.  Other PTA meds ordered. Will sub PRN albuterol inhaler for neb while inpatient.    Discussed with nursing. Formal hospitalist H&P in AM unless acute concerns arise.    JoAnna Barthell, PA-C  6/24/2019   8:36 PM

## 2019-06-25 NOTE — PROGRESS NOTES
Petition assembled,exhibit A completed, called [facility] to initiate petition and spoke with Lilian Rodgers, andres and Darell brought down to Administration for signature and notarization.

## 2019-06-25 NOTE — PHARMACY-ADMISSION MEDICATION HISTORY
Admission medication history interview status for the 6/24/2019  admission is complete. See EPIC admission navigator for prior to admission medications     Medication history source reliability:Good    Actions taken by pharmacist (provider contacted, etc):None     Additional medication history information not noted on PTA med list :patient did not get all doses in on 6/23 - states she is very behind    Medication reconciliation/reorder completed by provider prior to medication history? No    Time spent in this activity: 15 minutes    Prior to Admission medications    Medication Sig Last Dose Taking? Auth Provider   Albuterol Sulfate (VENTOLIN IN) Inhale 1-2 puffs into the lungs every 6 hours as needed  Past Week at Unknown time Yes Reported, Patient   baclofen (LIORESAL) 20 MG tablet Take 20 mg by mouth 3 times daily. 6/23/2019 Yes Reported, Patient   cetirizine (ZYRTEC) 10 MG tablet Take 10 mg by mouth daily 6/23/2019 at Unknown time Yes Reported, Patient   diclofenac (VOLTAREN) 1 % GEL Apply topically daily as needed for moderate pain (Apply to back)  Past Week at Unknown time Yes Reported, Patient   DULoxetine (CYMBALTA) 30 MG capsule Take 60 mg by mouth 2 times daily  6/23/2019 at Unknown time Yes Reported, Patient   HYDROcodone-acetaminophen (NORCO) 7.5-325 MG per tablet Take 1 tablet by mouth 3 times daily 6/23/2019 at Unknown time Yes Reported, Patient   hydrocortisone 1 % CREA cream Apply topically 2 times daily as needed for other (to face) 6/23/2019 at Unknown time Yes Unknown, Entered By History   metFORMIN (GLUCOPHAGE-XR) 500 MG 24 hr tablet Take 500 mg by mouth 3 times daily 6/23/2019 at Unknown time Yes Reported, Patient   metoprolol tartrate (LOPRESSOR) 25 MG tablet Take 25 mg by mouth 2 times daily 6/23/2019 at Unknown time Yes Reported, Patient   multivitamin, therapeutic (THERA-VIT) TABS tablet Take 1 tablet by mouth daily 6/23/2019 at Unknown time Yes Unknown, Entered By History   omeprazole 20 MG  tablet Take 20 mg by mouth 2 times daily. 6/23/2019 at Unknown time Yes Reported, Patient   polyethylene glycol (MIRALAX/GLYCOLAX) Packet Take 1 packet by mouth daily as needed  Past Month at Unknown time Yes Reported, Patient   pregabalin (LYRICA) 100 MG capsule Take 200 mg by mouth 3 times daily  6/23/2019 at Unknown time Yes Reported, Patient   SIMETHICONE-80 PO Take 80 mg by mouth 2 times daily 6/23/2019 at Unknown time Yes Unknown, Entered By History   tiZANidine (ZANAFLEX) 4 MG tablet Take 4 mg by mouth 2 times daily as needed  6/23/2019 at Unknown time Yes Reported, Patient   Acetaminophen (TYLENOL PO) Take 500 mg by mouth 2 times daily as needed for mild pain or fever  6/23/2019 at pm  Reported, Patient   alcohol swab prep pads Use to swab area of injection/julio cesar as directed.   Sudeep Willis MD   ALPRAZolam (XANAX) 0.5 MG tablet Take 0.5 mg by mouth daily as needed  6/23/2019  Reported, Patient   atorvastatin (LIPITOR) 40 MG tablet Take 1 tablet (40 mg) by mouth every evening 6/22/19  Sudeep Willis MD   blood glucose calibration (NO BRAND SPECIFIED) solution Use to calibrate blood glucose monitor as needed as directed.   Sudeep Willis MD   blood glucose monitoring (NO BRAND SPECIFIED) meter device kit Use to test blood sugar once daily in the morning   Sudeep Willis MD   blood glucose monitoring (NO BRAND SPECIFIED) test strip Use to test blood sugar once daily in the morning before breakfast   Sudeep Willis MD   Furosemide (LASIX PO) Take 20 mg by mouth daily as needed  6/22/19  Reported, Patient   potassium chloride SA (K-DUR/KLOR-CON M) 10 MEQ CR tablet Take 20 mEq by mouth daily as needed When takes furosemide 6/22/19  Reported, Patient   thin (NO BRAND SPECIFIED) lancets Use with lanceting device.   Sudeep Willis MD   WARFARIN SODIUM PO Take 5 mg by mouth daily  6/22/19  Reported, Patient

## 2019-06-25 NOTE — PLAN OF CARE
Pt continues to have delusional thoughts. Affect Blunted, mood calm,depressed. Visible and social with peers in the lounge. Bed rested after lunch.  at 0800,  at Noon.
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
## 2019-06-25 NOTE — PROGRESS NOTES
06/25/19 1400   General Information   Has Not Attended OT as of: 06/25/19     Pt has not attended OT since admit.  Will continue to encourage participation and completion of  self-assessment as able. OT staff will explain the purpose of being involved with treatment plan and provide options to meet current needs and goals.
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
## 2019-06-26 NOTE — PROGRESS NOTES
06/26/19 1300   General Information   Date Initially Attended OT 06/26/19   Clinical Impression   Affect Appropriate to situation   Orientation Needs further assessment   Appearance and ADLs General cleanliness observed in most areas   Attention to Internal Stimuli No observed signs   Interaction Skills Interacts appropriately with staff;Interacts appropriately with peers   Ability to Communicate Needs Independent   Verbal Content Appropriate to topic   Ability to Maintain Boundaries Maintains appropriate physical boundaries;Maintains appropriate verbal boundaries   Participation Participates with minimal encouragement   Concentration Concentrates 5-10 minutes   Ability to Concentrate With structure   Follows and Comprehends Directions Independently follows multi-step directions   Memory Needs further assessment   Organization Independently organizes simple tasks   Decision Making Needs further assessment   Planning and Problem Solving Needs further assessment   Ability to Apply and Learn Concepts Applies within group structure   Frustrations / Stress Tolerance Needs further assessment   Level of Insight Other (see comments)  (limited insight)   Self Esteem Poor self esteem   Social Supports Unable to identify any supports   General Observation/Plan   General Observations/Plan See Comments     INITIAL O.T. ASSESSMENT:      With initial task set up and MIN encouragement, pt participated in therapeutic group activity and discussion addressing role performance. Pt engaged in therapeutic activity addressing functional cognition and interpersonal skills with task set up. 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 Additionally, Pt ID'd what she does well in each role and what she could improve (e.g. Improve organization and stay on task). Problem-solved ways to implement changes. Pt will continue to benefit from OT intervention to address implementation of positive  functional coping skills, role performance, and community reintegration.      OT assessment completed by semi-structured interview and direct observation. Per chart, pt admitted 2/2 paranoid thinking. Goals ID'd include to manage time better and be able to set and finish goals. OT staff explained the purpose of pt being involved in current treatment plan.      Plan: Encourage ongoing attendance as able to meet self-stated goals, implement positive coping skills, engage in therapeutic activities, and provide assessment ongoing.

## 2019-06-26 NOTE — H&P
[facility]    Hospitalist Service chart check note       Date of Admission:  6/24/2019    Assessment & Plan   Samira Cullen is a 64 year old female with a complex past medical history significant for paranoid schizophrenia with hallucinations, anxiety, depression persistent atrial fibrillation, atrial flutter status post ablation, obstructive sleep apnea not on CPAP, non-insulin-dependent type 2 diabetes, history of non-ST elevation MI, stress-induced cardiomyopathy, hypertension, hyperlipidemia, history of right MCA CVA, history of DVT and pulmonary embolism, GERD, complex regional pain syndrome with chronic neck and back pain with prior fusions, mild intermittent asthma, and morbid obesity admitted on 6/24/2019 to inpatient psychiatry on a 72 hour hold for acute decompensated paranoid schizophrenia with auditory hallucinations and delusions.  Hospitalist service is following peripherally w/ chart checks re: glycemic mgmt.     Non-insulin-dependent type 2 diabetes  6/24/2019: HbA1c 7.4%.  Metformin PTA.  she does check glucose levels at home, daily, but does not use any insulin.   - Hypoglycemia protocol, fingerstick checks 4 times per day, well controlled, all <180mg/dL  - Resumed PTA metformin 500 mg 3 times daily resumed on evening of 6/24/19  - Medium sliding scale insulin --> Low dose sliding scale, only required 3 units last 24h.  We can discontinue this today 6/26/19  - change gluc checks to bid    Recent Labs   Lab 06/26/19  0159 06/25/19  2056 06/25/19  1711 06/25/19  1225 06/25/19  0808 06/25/19  0201  06/24/19  0916   GLC  --   --   --   --   --   --   --  162*   * 168* 153* 164* 146* 155*   < >  --     < > = values in this interval not displayed.     Acute decompensated paranoid schizophrenia with auditory hallucinations and delusions  Anxiety, depression  Direct admission from [facility] to mental health unit at [facility] for auditory hallucinations and delusions. 72 hour hold on  admit. Pt has been hearing voices and worried there is a sex ring in her apartment building.   - Adjust psychiatric medications per psychiatry    Persistent atrial fibrillation  History of atrial flutter status post ablation  Rate controlled with beta-blockade, HR 58-63 last 24h, and anticoagulated with warfarin.  History of a flutter ablation 4/2015.   - No PTA aspirin she is on warfarin.  Pharmacy to dose.  - Resume PTA beta-blockade with hold parameters    Obstructive Sleep apnea  Has declined to use CPAP in the past.  - Repeat sleep study ordered by PCP, patient reports this has been scheduled and she will be completing it    History of non-ST elevation MI  Stress-induced cardiomyopathy  Hypertension, Hyperlipidemia  Normally nonocclusive coronary artery disease on cath.  Follows with Dr. Rebecca Beck, last seen 5/30/2019.   ECHO 7/2018: EF 45%.  - Follow-up with cardiology as scheduled as an outpatient  - continue PTA metoprolol tartrate 25 mg twice daily and atorvastatin 40 mg nightly  - PRN Lasix for LE swelling, no doses required    History of Rt MCA CVA  History of DVT and pulmonary embolism   AC therapeutic, 2.9 on admission  - Warfarin as above, pharmacy dosing  - statin as above    GERD: Resumed prior to admission omeprazole and simethicone     Complex regional pain syndrome  Chronic neck and back pain  Hx C5-7 fusion, L5-S1 fusion  Trialed neck stimulator.   - Resumed PTA norco, tizanidine, baclofen, and lyrica PRN    BMI 41 c/w Morbid obesity: Close primary care follow up for diet/lifestyle changes.     Mild intermittent asthma: albuterol nebs PRN and zyrtec daily    The Hospitalist service will sign off since glycemic control is acceptable (<180mg/dL).  Please feel free to call back w/ any new medical concerns.      Diet: Moderate Consistent CHO Diet    DVT Prophylaxis: Warfarin  Morris Catheter: not present  Code Status: Full Code      Disposition Plan   Expected discharge: at the discretion of  psychiatry, recommended to prior living arrangement once cleared by psychiatry.  Entered: DEXTER Xie CNP 06/26/2019, 7:12 AM   [phone]  __________________________________________________________________  Physical Exam   Vital Signs: Temp: 97.7  F (36.5  C) Temp src: Oral BP: 120/60 Pulse: 63 Heart Rate: 62 Resp: 16 SpO2: 90 % O2 Device: None (Room air)    Weight: 238 lbs 4.8 oz    Pt not examined, chart check note only

## 2019-06-26 NOTE — PROGRESS NOTES
"[facility] Psychiatric Progress Note       Interim History     The patient's care was discussed with the treatment team and chart notes were reviewed. Pt seen on 6/26/19 by Dr. Chan. On interview, the patient reports she is feeling better today. She was able to get a healthy amount of rest last night, which is encouraging since sleep was a major issue for the patient prior to admission. Patient still however complains about her chronic pain. She declares that she feels less fearful and calmer in comparison to yesterday. Patient continues to be in agreement with taking medications, especially willing to take the new medication, Abilify. Although the patient does appear much calmer, less psychotic, and more cooperative in general, she proceeds to wish to be discharged by tomorrow. She insists, \"I need to get back home.\" Dr. Chan does not deem the patient to be appropriate for discharge, will see how patient is tomorrow and how the petition for commitment rules out to be.      Hospital Course   This is a 64 year old female with a history of chronic opoid abuse (associated with chronic pain) and paranoid thinking which has required short inpatient mental health stays. She was presented to [facility] ED on 6/25/19 for paranoid thinking. Patient had called the [place] police several days prior to admission date due to increased suspicious activity going on in the apartment above her. The patient claimed that the gentlemen that lives above her is leading a \"sex ring\" and had been doing so for quite some time. This same delusional thinking is what prompted her admission back in July 2018, in which she saw Dr. Mantilla. At that time, she was assessed to have an unspecified psychotic disorder. Patient does not currently follow with any psychiatrist, psychologist, or therapist and had not been on any psychotropic medications prior to this admission. Besides chronic opioid use (which is managed through a " pain clinic), she does not obtain any other extensive substance use history. From this, it seems unlikely that patient is experiencing substance-induced psychosis. Her delusional thinking seemed to have started when she moved to an apartment in [place], [place] from [place]. Patient does have a nephew and niece with psychotic disorders. While meeting with her initially, the patient fully believed that her thought process was true and valid and was unwilling to take any psychotropic medications; with the reason being two-fold. She did not want to take any more medications and she did not want a diagnosis of schizophrenia or psychosis when she did not feel that she was deserving of such a diagnosis. Patient lacks family support, therefore Dr. Chan felt that she was subject to decompensation if her condition was not properly managed with medication. Dr. Chan felt that patient warranted a petition for commitment in order for her to start her on psychotropic medication to get her delusional thinking under control.      Medications     Current Facility-Administered Medications Ordered in Epic   Medication Dose Route Frequency Last Rate Last Dose     acetaminophen (TYLENOL) tablet 500 mg  500 mg Oral BID PRN         albuterol (PROAIR HFA/PROVENTIL HFA/VENTOLIN HFA) 108 (90 Base) MCG/ACT inhaler 2 puff  2 puff Inhalation Q6H PRN         ALPRAZolam (XANAX) tablet 0.5 mg  0.5 mg Oral Daily PRN         atorvastatin (LIPITOR) tablet 40 mg  40 mg Oral QPM   40 mg at 06/25/19 2148     baclofen (LIORESAL) tablet 20 mg  20 mg Oral TID   20 mg at 06/25/19 2149     cetirizine (zyrTEC) tablet 10 mg  10 mg Oral Daily   10 mg at 06/25/19 0809     glucose gel 15-30 g  15-30 g Oral Q15 Min PRN        Or     dextrose 50 % injection 25-50 mL  25-50 mL Intravenous Q15 Min PRN        Or     glucagon injection 1 mg  1 mg Subcutaneous Q15 Min PRN         diclofenac (VOLTAREN) 1 % topical gel 2 g  2 g Topical Daily PRN         DULoxetine  (CYMBALTA) DR capsule 60 mg  60 mg Oral BID   60 mg at 06/25/19 2148     furosemide (LASIX) tablet 20 mg  20 mg Oral Daily PRN         HYDROcodone-acetaminophen (NORCO) 7.5-325 MG per tablet 1 tablet  1 tablet Oral TID   1 tablet at 06/25/19 2148     hydrOXYzine (ATARAX) tablet 25 mg  25 mg Oral Q4H PRN   25 mg at 06/25/19 0112     insulin aspart (NovoLOG) inj (RAPID ACTING)  1-3 Units Subcutaneous TID AC   1 Units at 06/25/19 1735     insulin aspart (NovoLOG) inj (RAPID ACTING)  1-3 Units Subcutaneous At Bedtime         metFORMIN (GLUCOPHAGE-XR) 24 hr tablet 500 mg  500 mg Oral TID   500 mg at 06/25/19 2149     metoprolol tartrate (LOPRESSOR) tablet 25 mg  25 mg Oral BID   25 mg at 06/25/19 2149     multivitamin, therapeutic (THERA-VIT) tablet 1 tablet  1 tablet Oral Daily   1 tablet at 06/25/19 0809     naloxone (NARCAN) injection 0.1-0.4 mg  0.1-0.4 mg Intravenous Q2 Min PRN         omeprazole (priLOSEC) CR capsule 20 mg  20 mg Oral BID   20 mg at 06/25/19 2148     polyethylene glycol (MIRALAX/GLYCOLAX) Packet 17 g  17 g Oral Daily PRN         potassium chloride ER (K-DUR/KLOR-CON M) CR tablet 20 mEq  20 mEq Oral Daily PRN         pregabalin (LYRICA) capsule 100 mg  100 mg Oral TID   100 mg at 06/25/19 2149     simethicone (MYLICON) chewable tablet 80 mg  80 mg Oral BID   80 mg at 06/25/19 2148     tiZANidine (ZANAFLEX) tablet 4 mg  4 mg Oral BID PRN   4 mg at 06/26/19 0521     Warfarin Therapy Reminder (Check START DATE - warfarin may be starting in the FUTURE)  1 each Does not apply Continuous PRN         No current Epic-ordered outpatient medications on file.         Allergies      Allergies   Allergen Reactions     Ace Inhibitors Other (See Comments)     Elevated creatinine levels     Morphine Sulfate Nausea and Vomiting, Itching and Difficulty breathing     Pollen Extract      Other reaction(s): Runny Nose  dust        Medical Review of Systems     /60   Pulse 63   Temp 97.7  F (36.5  C) (Oral)    "Resp 16   Ht 1.607 m (5' 3.25\")   Wt 108.1 kg (238 lb 4.8 oz)   SpO2 90%   BMI 41.88 kg/m    Body mass index is 41.88 kg/m .  A 10-point review of systems was performed by Tai Chan MD and is negative, no new findings.      Psychiatric Examination     Appearance Sitting in chair, dressed in hospital scrubs. Appears stated age.   Attitude Cooperative   Orientation Oriented to person, place, time   Eye Contact Fair   Speech Regular rate, rhythm, volume and tone   Language Normal   Psychomotor Behavior Normal   Mood Good   Affect Flat, calmer   Thought Process Goal-Oriented, Intact   Associations Intact   Thought Content Patient is currently negative for suicidal ideation, negative for plan or intent, able to contract no self harm and identify barriers to suicide.  Negative for obsessions, compulsions or psychosis.     Fund of Knowledge Fair    Insight Poor   Judgement Poor   Attention Span & Concentration Fair   Recent & Remote Memory Normal    Gait Normal   Muscle Tone Intact        Labs     Labs reviewed.  Recent Results (from the past 24 hour(s))   INR    Collection Time: 06/25/19  7:55 AM   Result Value Ref Range    INR 2.44 (H) 0.86 - 1.14   Glucose by meter    Collection Time: 06/25/19  8:08 AM   Result Value Ref Range    Glucose 146 (H) 70 - 99 mg/dL   Glucose by meter    Collection Time: 06/25/19 12:25 PM   Result Value Ref Range    Glucose 164 (H) 70 - 99 mg/dL   Glucose by meter    Collection Time: 06/25/19  5:11 PM   Result Value Ref Range    Glucose 153 (H) 70 - 99 mg/dL   Glucose by meter    Collection Time: 06/25/19  8:56 PM   Result Value Ref Range    Glucose 168 (H) 70 - 99 mg/dL   Glucose by meter    Collection Time: 06/26/19  1:59 AM   Result Value Ref Range    Glucose 157 (H) 70 - 99 mg/dL        Impression   This is a 64 year old female with a history of chronic opoid abuse (associated with chronic pain) and paranoid thinking which has required short inpatient mental health stays. " While meeting with Dr. Chan this morning, the patient noted feeling better overall and that she was able to get a good nights rest last night (sleep has been a major problem for patient prior to admission). She has been medication compliant and continues to be in agreement with taking the new medication, Abilify. Patient did appear to be more calm, less anxious, with less psychotic signs however continued to demand to be discharged by tomorrow. A petition for commitment was filed today, thus will be reviewed soon to determine if it is supported or not. Until then, we will continue to monitor patient closely.        Diagnoses     1. Psychotic disorder, otherwise not specified      Plan     1. Explained side effects, benefits, and complications of medications to the patient, Pt gave verbal consent.  2. Medication changes: None   3. Discussed treatment plan with patient and team.  4. Projected length of stay: 7+ days      Attestation:   Patient has been seen and evaluated by me, Tai Chan MD.    Patient ID:  Name: Samira Cullen    MRN: [id number]  Admission: 6/24/2019   YOB: 1955

## 2019-06-26 NOTE — PROGRESS NOTES
Fall alarm criteria reviewed. Fall alarm activated at bedtime tonight. This patients risk of falling outweighs potential risks with bed alarm use. Provider order obtained from Dr. Roberto. Rationale for use of alarm will be reviewed and documented every shift in which alarm is in use.

## 2019-06-26 NOTE — PROGRESS NOTES
Fall alarm criteria reviewed. This patients risk of falling outweighs potential risks with bed alarm use. Provider order obtained from Dr. Roberto. Rationale for use of alarm will be reviewed and documented every shift in which alarm is in use.

## 2019-06-26 NOTE — PLAN OF CARE
Pt. has been oriented and organized in thought process except for when talking about situation in the residence above her. Very hypersensitve to sounds and fear of crime occurring. Tearful as she talks about sister and brother in-law being killed in May 2017 by their son who had Mental Health and Chemical Dependency issues. Pt. feels police neglected to act on the dangerousness of the situation.  Pt. went on to describe some of the details that haunt her today. ( Information was verified as reality)  Admits she has PTSD like symptoms related to incident. Pt. Denies being delusional or paranoid but admits she is depressed - attributes this to feeling abandon by extended family and lack of finances/resources in place to take care of her physical and emotional issues. Currently afraid she will be evicted from current residence for calling the police on tenants upstairs. Does not feel she needs to be in the hospital.  Ate dinner. Socialized with peers. Attended OT and Wrap up group participating appropriately.

## 2019-06-27 NOTE — PROGRESS NOTES
"[facility] Psychiatric Progress Note       Interim History     The patient's care was discussed with the treatment team and chart notes were reviewed. Pt seen on 6/27/19 by Dr. Chan. Patient notes she is doing fine this afternoon, however has been abnormally sleepy. She states, \"I feel chronic pain and fatigue most of the time,\" but overall notes that she is feeling better. Patient declares she feels ready and stable enough to be discharged today. Dr. Chan agrees with patient, she appears and demonstrates to be appropriate and safe for discharge. Patient plans to follow-up with her prior to admission physician for medication management, however Dr. Chan encourages the patient to seek a psychiatrist in the [place] area.      Hospital Course   This is a 64 year old female with a history of chronic opoid abuse (associated with chronic pain) and paranoid thinking which has required short inpatient mental health stays. She was presented to [facility] ED on 6/25/19 for paranoid thinking. Patient had called the [place] police several days prior to admission date due to increased suspicious activity going on in the apartment above her. The patient claimed that the gentlemen that lives above her is leading a \"sex ring\" and had been doing so for quite some time. This same delusional thinking is what prompted her admission back in July 2018, in which she saw Dr. Mantilla. At that time, she was assessed to have an unspecified psychotic disorder. Patient does not currently follow with any psychiatrist, psychologist, or therapist and had not been on any psychotropic medications prior to this admission. Besides chronic opioid use (which is managed through a pain clinic), she does not obtain any other extensive substance use history. From this, it seems unlikely that patient is experiencing substance-induced psychosis. Her delusional thinking seemed to have started when she moved to an apartment in " [place], [place] from [place]. Patient does have a nephew and niece with psychotic disorders. While meeting with her initially, the patient fully believed that her thought process was true and valid and was unwilling to take any psychotropic medications; with the reason being two-fold. She did not want to take any more medications and she did not want a diagnosis of schizophrenia or psychosis when she did not feel that she was deserving of such a diagnosis. Patient lacks family support, therefore Dr. Chan felt that she was subject to decompensation if her condition was not properly managed with medication. Dr. Chan felt that patient warranted a petition for commitment in order for her to start her on psychotropic medication to get her delusional thinking under control. While meeting with Dr. Chan this morning, the patient noted feeling better overall and that she was able to get a good nights rest last night (sleep has been a major problem for patient prior to admission). She has been medication compliant and continues to be in agreement with taking the new medication, Abilify. Patient did appear to be more calm, less anxious, with less psychotic signs however continued to demand to be discharged by tomorrow. A petition for commitment was filed today, thus will be reviewed soon to determine if it is supported or not. Until then, we will continue to monitor patient closely.      Medications     Current Facility-Administered Medications Ordered in Epic   Medication Dose Route Frequency Last Rate Last Dose     acetaminophen (TYLENOL) tablet 500 mg  500 mg Oral BID PRN         albuterol (PROAIR HFA/PROVENTIL HFA/VENTOLIN HFA) 108 (90 Base) MCG/ACT inhaler 2 puff  2 puff Inhalation Q6H PRN         ALPRAZolam (XANAX) tablet 0.5 mg  0.5 mg Oral Daily PRN         ARIPiprazole (ABILIFY) tablet 5 mg  5 mg Oral QAM   5 mg at 06/27/19 0815     atorvastatin (LIPITOR) tablet 40 mg  40 mg Oral QPM   40 mg at 06/26/19 2024      baclofen (LIORESAL) tablet 20 mg  20 mg Oral TID   20 mg at 06/27/19 0814     cetirizine (zyrTEC) tablet 10 mg  10 mg Oral Daily   10 mg at 06/27/19 0814     glucose gel 15-30 g  15-30 g Oral Q15 Min PRN        Or     dextrose 50 % injection 25-50 mL  25-50 mL Intravenous Q15 Min PRN        Or     glucagon injection 1 mg  1 mg Subcutaneous Q15 Min PRN         diclofenac (VOLTAREN) 1 % topical gel 2 g  2 g Topical Daily PRN         DULoxetine (CYMBALTA) DR capsule 60 mg  60 mg Oral BID   60 mg at 06/27/19 0814     furosemide (LASIX) tablet 20 mg  20 mg Oral Daily PRN         HYDROcodone-acetaminophen (NORCO) 7.5-325 MG per tablet 1 tablet  1 tablet Oral TID   1 tablet at 06/27/19 0814     hydrOXYzine (ATARAX) tablet 25 mg  25 mg Oral Q4H PRN   25 mg at 06/25/19 0112     metFORMIN (GLUCOPHAGE-XR) 24 hr tablet 500 mg  500 mg Oral TID   500 mg at 06/27/19 0814     metoprolol tartrate (LOPRESSOR) tablet 25 mg  25 mg Oral BID   25 mg at 06/27/19 0815     multivitamin, therapeutic (THERA-VIT) tablet 1 tablet  1 tablet Oral Daily   1 tablet at 06/27/19 0814     naloxone (NARCAN) injection 0.1-0.4 mg  0.1-0.4 mg Intravenous Q2 Min PRN         omeprazole (priLOSEC) CR capsule 20 mg  20 mg Oral BID   20 mg at 06/27/19 0814     polyethylene glycol (MIRALAX/GLYCOLAX) Packet 17 g  17 g Oral Daily PRN         potassium chloride ER (K-DUR/KLOR-CON M) CR tablet 20 mEq  20 mEq Oral Daily PRN         pregabalin (LYRICA) capsule 100 mg  100 mg Oral TID   100 mg at 06/27/19 0815     simethicone (MYLICON) chewable tablet 80 mg  80 mg Oral BID   80 mg at 06/27/19 0815     tiZANidine (ZANAFLEX) tablet 4 mg  4 mg Oral BID PRN   4 mg at 06/27/19 0238     warfarin (COUMADIN) tablet 5 mg  5 mg Oral ONCE at 18:00         Warfarin Therapy Reminder (Check START DATE - warfarin may be starting in the FUTURE)  1 each Does not apply Continuous PRN         No current [facility]-ordered outpatient medications on file.         Allergies      Allergies  "  Allergen Reactions     Ace Inhibitors Other (See Comments)     Elevated creatinine levels     Morphine Sulfate Nausea and Vomiting, Itching and Difficulty breathing     Pollen Extract      Other reaction(s): Runny Nose  dust        Medical Review of Systems     /57   Pulse 63   Temp 98  F (36.7  C) (Oral)   Resp 17   Ht 1.607 m (5' 3.25\")   Wt 108.1 kg (238 lb 4.8 oz)   SpO2 94%   BMI 41.88 kg/m    Body mass index is 41.88 kg/m .  A 10-point review of systems was performed by Tai Chan MD and is negative, no new findings.      Psychiatric Examination     Appearance Sitting in chair, dressed in hospital scrubs. Appears stated age.   Attitude Cooperative   Orientation Oriented to person, place, time   Eye Contact Fair   Speech Regular rate, rhythm, volume and tone   Language Normal   Psychomotor Behavior Normal   Mood Good   Affect Flat    Thought Process Goal-Oriented, Intact   Associations Intact   Thought Content Patient is currently negative for suicidal ideation, negative for plan or intent, able to contract no self harm and identify barriers to suicide.  Negative for obsessions, compulsions or psychosis.     Fund of Knowledge Fair    Insight Improving   Judgement Improving   Attention Span & Concentration Fair   Recent & Remote Memory Normal    Gait Normal   Muscle Tone Intact        Labs     Labs reviewed.  Recent Results (from the past 24 hour(s))   Glucose by meter    Collection Time: 06/26/19  4:55 PM   Result Value Ref Range    Glucose 168 (H) 70 - 99 mg/dL   Glucose by meter    Collection Time: 06/26/19  9:08 PM   Result Value Ref Range    Glucose 142 (H) 70 - 99 mg/dL   Glucose by meter    Collection Time: 06/27/19  2:39 AM   Result Value Ref Range    Glucose 132 (H) 70 - 99 mg/dL   INR    Collection Time: 06/27/19 11:40 AM   Result Value Ref Range    INR 2.65 (H) 0.86 - 1.14        Impression   This is a 64 year old female with a history of chronic opoid abuse (associated with " chronic pain) and paranoid thinking which has required short inpatient mental health stays. While meeting with patient this afternoon, she appeared to be calm, less anxious, and no longer psychotic. Both patient and Dr. Chan deem her to be ready, appropriate, and safe to discharge today. She prefers to will follow-up with her prior to admission physician for medication management. Dr. Chan however highly suggested that patient consider continuing with medication management through a psychiatrist in the [place], [place] area. Patient acknowledged.       Diagnoses     1. Psychotic disorder, otherwise not specified      Plan     1. Explained side effects, benefits, and complications of medications to the patient, Pt gave verbal consent.  2. Medication changes: None  3. Discussed treatment plan with patient and team.  4. Projected length of stay: discharge today  5. Patient will follow-up with her prior to admission physician for medication management       Attestation:   Patient has been seen and evaluated by me, Tai Chan MD.    Patient ID:  Name: Samira Cullen    MRN: [id number]  Admission: 6/24/2019   YOB: 1955

## 2019-06-27 NOTE — DISCHARGE INSTRUCTIONS
Behavioral Discharge Planning and Instructions    Summary: Admitted to hospital with psychosis- called police insisting a sex ring operating upstairs.    Main Diagnosis: Psychotic disorder, NOS; Rule out Schizophrenia.     Major Treatments, Procedures and Findings: Psychiatric assessment; Petition for mental health commitment initiated in [facility]- petition not supported; Medication adjustment.    Symptoms to Report: feeling more aggressive, increased confusion or mood getting worse    Lifestyle Adjustment: Take medication as prescribed. Follow up with medication management as scheduled.    Psychiatry Follow-up:   Tuesday July 23, 2019 . Check in at 8:30 am and interview at 9 am.  Medication management intake with Chito Mcneill- appointment on   [facility]  [address] [place] [address]  [phone] fax: [phone]    [facility] Central Intake- [phone]  Call to request [facility] . You agreed to do this when the screener came to see you.    Resources:   [facility] Crisis- [phone]  Crisis Intervention: 708.552.3313 or [phone] (TTY: [phone]).  Call anytime for help.  [place] on [place]ness (www.[place].org): 558.806.5590 or 831-743-6531.  National Suicide Prevention Line (www.mentalhealthmn.org): 679-131-COIB (4175)    General Medication Instructions:   See your medication sheet(s) for instructions.   Take all medicines as directed.  Make no changes unless your doctor suggests them.   Go to all your doctor visits.  Be sure to have all your required lab tests. This way, your medicines can be refilled on time.  Do not use any drugs not prescribed by your doctor.  Avoid alcohol.

## 2019-06-27 NOTE — PLAN OF CARE
Flat but reactive affect, mood calm. Visible in the morning, watching TV, reading the paper or coloring. Social with another peer. Denies auditory hallucinations, thought process has been logical, insight good.  Attended morning group. Denies SI. Med compliant.

## 2019-06-27 NOTE — PROGRESS NOTES
[facility] prepetition screener was at hospital yesterday to screen patient and review records. Today she called to report screening team is not supporting petition, as patient said she would continue the Abilify, would go to counseling and would contact the county to request case management services.

## 2019-06-27 NOTE — PLAN OF CARE
Pt presents with a full range affect and calm mood. Insight is appropriate. Though process is WDL. She has been social and supportive to her female peer. Attended activity group and worked on a project. Ate all of her dinner. Ambulating fine. Pt has a dried wound on her right leg that she asked RN to bandage. BGMs were 168 and 142.

## 2019-06-27 NOTE — PLAN OF CARE
Patient discharged from 77. Went over discharge summary with patient, discharge meds , appts. Gave discharge med ( Abilify), belongings . Patient denies SI / HI , was alert , calm , cooperative , oriented x 4 . No paranoia, thought process organized and logical. Left unit alone with taxi pickup.
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
## 2019-06-27 NOTE — PLAN OF CARE
BEHAVIORAL TEAM DISCUSSION    Participants: MD,OT,PA,RN,CM  Progress: Improving on medication. Requests discharge.  Continued Stay Criteria/Rationale: Physician approved discharge.  Medical/Physical: NA  Precautions: Code1  Behavioral Orders   Procedures    Code 1 - Restrict to Unit    Fall precautions    Routine Programming     As clinically indicated    Status 15     Every 15 minutes.    Suicide precautions     Patients on Suicide Precautions should have a Combination Diet ordered that includes a Diet selection(s) AND a Behavioral Tray selection for Safe Tray - with utensils, or Safe Tray - NO utensils       Plan: Patient discharged with referral to [facility] for medication management.  Rationale for change in precautions or plan: Petition for commitment had been originated, but was not supported and patient agreed to medication and follow up.

## 2019-07-02 PROBLEM — F22 PARANOIA (H): Status: ACTIVE | Noted: 2019-01-01

## 2019-07-02 NOTE — ED NOTES
Bed: ED08  Expected date: 7/1/19  Expected time: 8:37 PM  Means of arrival: Ambulance  Comments:  Margaret Forrester
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
## 2019-07-02 NOTE — PLAN OF CARE
BEHAVIORAL TEAM DISCUSSION    Participants: Dr. Eliseo Steiner MD, Earle Bennett RN, Robles Wyman LPC  Progress: New Patient  Continued Stay Criteria/Rationale: Evaluate and assess  Medical/Physical: Evaluate and assess  Precautions:   Behavioral Orders   Procedures    Code 1 - Restrict to Unit    Fall precautions    Routine Programming     As clinically indicated    Status 15     Every 15 minutes.     Plan: Continue to evaluate and assess  Rationale for change in precautions or plan: None

## 2019-07-02 NOTE — PROGRESS NOTES
07/02/19 0432   Patient Belongings   Did you bring any home meds/supplements to the hospital?  No   Patient Belongings sent to security per site process   Patient Belongings Put in Hospital Secure Location (Security or Locker, etc.) cane;cash/credit card;cell phone/electronics;clothing;glasses;keys;money (see comment);purse/wallet;shoes   Belongings Search Yes   Clothing Search Yes   Second Staff Tiana (RN)       In locker:  Cell phone in purple and black case, cell phone , two key chains with keys, black wallet with various cards and papers, black check book, small black bag with miscellaneous items, black pants, shoes, shirt, bra, underwear, black socks, brush, glasses in case    Sent to security:  3 check books, [employer] club gift card ([id number]), Mastercard (6398), [employer] epdro card (5195), Club signature card ([id number]), $127 in cash ( $1 X 2, $5 X 3, $10 X 1, $20 X 5)      A               Admission:  I am responsible for any personal items that are not sent to the safe or pharmacy.  [facility] is not responsible for loss, theft or damage of any property in my possession.    Signature:  _________________________________ Date: _______  Time: _____                                              Staff Signature:  ____________________________ Date: ________  Time: _____      2nd Staff person, if patient is unable/unwilling to sign:    Signature: ________________________________ Date: ________  Time: _____     Discharge:  [facility] has returned all of my personal belongings:    Signature: _________________________________ Date: ________  Time: _____                                          Staff Signature:  ____________________________ Date: ________  Time: _____
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
## 2019-07-02 NOTE — CONSULTS
Internal Medicine Initial Visit      Samira Cullen MRN# [id number]   YOB: 1955 Age: 64 year old   Date of Admission: 7/2/2019  PCP: [facility]re[facility] Altam[facility]    Referring Provider: Behavioral Health - Eliseo Steiner MD  Reason for Visit: General Medical Evaluation, Right Thigh Non Healing Wound         Assessment and Recommendations:   Samira Cullen is a 64 year old year old woman with a history of paranoid schizophrenia, obesity, DM II, HTN, HLD, NSTEMI w/ stress induced cardiomyopathy and mild CAD, right MCA CVA, DVT/PE and a fib/flutter (w/ prior ablation) on chronic anticoagulation, untreated FERNANDO, GERD, and complex regional pain syndrome with chronic neck/back pain on chronic opioids who is admitted to station 10 with delusions.        Paranoid Schizophrenia. Management per psychiatry team.     Right Thigh Non Healing Wound. Following punch biopsy by dermatology. Does not appear secondarily infected.   - OP f/u w/ dermatology.     DM II. HgbA1c 7.4%.   - Continue home metformin.   - BID BG checks here.     HTN. BPs 150s/80s before AM meds. Reports recent decrease in metoprolol by cardiology d/t resting HR in the high 50s.   - Continue home metoprolol.     Paroxysmal A Fib.   Stress Induced Cardiomyopathy.   Last echo 7/2018 w/ LVEF 42-47%, distal septum and apex hypokinetic. HR currently 80s. Appears euvolemic on exam.   - Continue beta blocker as above.   - Daily weights. Takes her Lasix PRN rather than scheduled.   - Continue warfarin w/ INR goal 2-3 (a fib, prior CVA, prior DVT/PE).     Chronic Pain. Back/neck, followed by pain clinic, w/ dorsal column stimulator in place.   - Continue home Norco, Baclofen, tizanidine, Lyrica, Cymbalta.      Medicine will sign off, please page with any additional concerns.     Jeni Rodriguez PA-C  Hospitalist Service   Pager: [phone]     Reason for Admission:   Delusions          History of Present Illness:   History is  obtained from the patient and medical record.     Samira Cullen is a 64 year old year old woman with a history of paranoid schizophrenia and multiple medical comorbidities admitted to behavioral health for delusions.    Internal Medicine service was asked to see patient for a general medication evaluation and non healing right thigh wound. Currently, patient is unhappy about being here. She denies any new physical complaints. She did not sleep well last night. She has a dorsal column stimulator but cannot charge it here. Sees a pain doctor. Had a thigh biopsy by dermatology but is unsure of results and wound hasn't healed yet - itches at times, plans to follow up.           Review of Systems:   10 point ROS performed and negative unless otherwise noted.           Past Medical History:   Reviewed and updated in Epic.  Past Medical History:   Diagnosis Date     Anxiety and depression      Atrial flutter (H)     s/p Ablation of cavo-tricuspid isthmus for atrial flutter 4/2015     Brachial plexitis      CVA (cerebral vascular accident) (H)     s/p thrombectomy of right middle cerebral artery, patent internal carotid 7/28/16     DM2 (diabetes mellitus, type 2) (H)      DVT (deep venous thrombosis) (H)     2016, 2012     Hearing loss      Heart burn      Hypertension      NSTEMI (non-ST elevated myocardial infarction) (H)     cardiac cath 7/25/18: Minimal nonocclusive coronary artery disease     Paranoid schizophrenia (H)      PE (pulmonary thromboembolism) (H)     2012     Peripheral edema      Persistent atrial fibrillation (H)      Seasonal allergies      Sleep apnea      Stress-induced cardiomyopathy     Minimal nonocclusive coronary artery disease     Weakness      Wound healing, delayed              Past Surgical History:   Reviewed and updated in Epic.  Past Surgical History:   Procedure Laterality Date     ABDOMEN SURGERY      exploratory laparotomy (twisted bowel)     BACK SURGERY      cervical fusion  (c5-7)     BACK SURGERY      lumbar fusion (L5-S1)     BREAST SURGERY  1994    Breast reduction     COLONOSCOPY N/A 1/28/2019    Procedure: COLONOSCOPY;  Surgeon: Julieta Ramos MD;  Location:  GI     ESOPHAGOSCOPY, GASTROSCOPY, DUODENOSCOPY (EGD), COMBINED N/A 1/28/2019    Procedure: COMBINED ESOPHAGOSCOPY, GASTROSCOPY, DUODENOSCOPY (EGD), BIOPSY SINGLE OR MULTIPLE;  Surgeon: Julieta Ramos MD;  Location:  GI     HYSTERECTOMY  2002     ORTHOPEDIC SURGERY  1999    Left Knee tear     ORTHOPEDIC SURGERY      bilateral knee replacement     OTHER SURGICAL HISTORY      cardiac cath 7/25/18: Minimal nonocclusive coronary artery disease     OTHER SURGICAL HISTORY      Ablation of cavo-tricuspid isthmus for atrial flutter 4/2015             Social History:     Social History     Tobacco Use     Smoking status: Never Smoker     Smokeless tobacco: Never Used   Substance Use Topics     Alcohol use: Yes     Comment: Hardly ever     Drug use: No             Family History:   Reviewed and non contributory.           Allergies:     Allergies   Allergen Reactions     Ace Inhibitors Other (See Comments)     Elevated creatinine levels     Morphine Sulfate Nausea and Vomiting, Itching and Difficulty breathing     Pollen Extract      Other reaction(s): Runny Nose  dust             Medications:     Medications Prior to Admission   Medication Sig Dispense Refill Last Dose     acetaminophen (TYLENOL) 325 MG tablet Take 325-650 mg by mouth 2 times daily as needed for mild pain   7/1/2019     albuterol (PROAIR HFA/PROVENTIL HFA/VENTOLIN HFA) 108 (90 Base) MCG/ACT inhaler Inhale 2 puffs into the lungs every 6 hours as needed for shortness of breath / dyspnea or wheezing   6/30/2019     ARIPiprazole (ABILIFY) 5 MG tablet Take 1 tablet (5 mg) by mouth every morning 30 tablet 0 7/1/2019     atorvastatin (LIPITOR) 40 MG tablet Take 1 tablet (40 mg) by mouth every evening 90 tablet 0 6/30/2019     baclofen (LIORESAL) 20 MG tablet  Take 20 mg by mouth 3 times daily.   7/1/2019     cetirizine (ZYRTEC) 10 MG tablet Take 10 mg by mouth daily   7/1/2019 at Unknown time     diclofenac (VOLTAREN) 1 % GEL Apply topically daily as needed for moderate pain (Apply to back)    6/30/2019     DULoxetine (CYMBALTA) 60 MG capsule Take 60 mg by mouth 2 times daily   7/1/2019     furosemide (LASIX) 20 MG tablet Take 40 mg by mouth daily (Patient takes only as needed, but is supposed to be taking daily)   6/30/2019     HYDROcodone-acetaminophen (NORCO) 7.5-325 MG per tablet Take 1 tablet by mouth 3 times daily   7/1/2019     hydrocortisone 1 % CREA cream Apply topically 2 times daily as needed for other (to face)   7/1/2019     metFORMIN (GLUCOPHAGE-XR) 500 MG 24 hr tablet Take 500 mg by mouth 3 times daily  3 7/1/2019     metoprolol tartrate (LOPRESSOR) 25 MG tablet Take 25 mg by mouth 2 times daily   7/1/2019     multivitamin, therapeutic (THERA-VIT) TABS tablet Take 1 tablet by mouth daily   7/1/2019     omeprazole (PRILOSEC) 20 MG DR capsule Take 20 mg by mouth 2 times daily   7/1/2019     Pregabalin (LYRICA) 200 MG capsule Take 200 mg by mouth 3 times daily   7/1/2019     tiZANidine (ZANAFLEX) 4 MG tablet Take 4 mg by mouth 2 times daily as needed    7/1/2019 at Unknown time     WARFARIN SODIUM PO Take 5 mg by mouth daily    7/1/2019 at Unknown time     alcohol swab prep pads Use to swab area of injection/julio cesar as directed. 100 each 3      blood glucose calibration (NO BRAND SPECIFIED) solution Use to calibrate blood glucose monitor as needed as directed. 1 Bottle 3      blood glucose monitoring (NO BRAND SPECIFIED) meter device kit Use to test blood sugar once daily in the morning 1 kit 0      blood glucose monitoring (NO BRAND SPECIFIED) test strip Use to test blood sugar once daily in the morning before breakfast 100 strip 6      potassium chloride SA (K-DUR/KLOR-CON M) 10 MEQ CR tablet Take 20 mEq by mouth daily as needed When takes furosemide   More  "than a month     thin (NO BRAND SPECIFIED) lancets Use with lanceting device. 100 each 0         Current Facility-Administered Medications   Medication     acetaminophen (TYLENOL) tablet 650 mg     alum & mag hydroxide-simethicone (MYLANTA ES/MAALOX  ES) suspension 30 mL     ARIPiprazole (ABILIFY) tablet 5 mg     atorvastatin (LIPITOR) tablet 40 mg     baclofen (LIORESAL) tablet 20 mg     bisacodyl (DULCOLAX) Suppository 10 mg     cetirizine (zyrTEC) tablet 10 mg     glucose gel 15-30 g    Or     dextrose 50 % injection 25-50 mL    Or     glucagon injection 1 mg     DULoxetine (CYMBALTA) DR capsule 60 mg     furosemide (LASIX) tablet 20 mg     HYDROcodone-acetaminophen (NORCO) 7.5-325 MG per tablet 1 tablet     hydrOXYzine (ATARAX) tablet 25 mg     magnesium hydroxide (MILK OF MAGNESIA) suspension 30 mL     metFORMIN (GLUCOPHAGE-XR) 24 hr tablet 500 mg     metoprolol tartrate (LOPRESSOR) tablet 25 mg     multivitamin, therapeutic (THERA-VIT) tablet 1 tablet     naloxone (NARCAN) injection 0.1-0.4 mg     omeprazole (priLOSEC) CR capsule 20 mg     Pregabalin (LYRICA) capsule 200 mg     simethicone (MYLICON) chewable tablet 80 mg     tiZANidine (ZANAFLEX) tablet 4 mg     traZODone (DESYREL) tablet 50 mg     Warfarin Therapy Reminder (Check START DATE - warfarin may be starting in the FUTURE)            Physical Exam:   Temperature 98.8  F (37.1  C), temperature source Tympanic, resp. rate 16, height 1.6 m (5' 3\"), weight 106.9 kg (235 lb 9.6 oz), SpO2 95 %.  Body mass index is 41.73 kg/m .  GENERAL: Alert and oriented x 3. Ambulatory on unit w/ walker. Appears comfortable.  HEENT: Anicteric sclera. Mucous membranes moist.   CV: RRR. S1, S2. No murmurs appreciated.   RESPIRATORY: Effort normal on room air. Lungs CTAB with no wheezing, rales, rhonchi.   GI: Abdomen soft, non distended, non tender.   NEUROLOGICAL: No focal deficits. Moves all extremities.   EXTREMITIES: No peripheral edema. Intact bilateral pedal pulses. "   SKIN: No jaundice. No rashes. Small non healing ulcer on right thigh over prior punch biopsy site - no purulence, surrounding erythema/warmth.           Data:   CBC:  Recent Labs   Lab Test 06/24/19  0916   WBC 4.5   RBC 4.33   HGB 12.8   HCT 39.9   MCV 92   MCH 29.6   MCHC 32.1   RDW 14.4          CMP:  Recent Labs   Lab Test 06/24/19  0916  07/25/18  0936      < > 135   POTASSIUM 3.9   < > 3.8   CHLORIDE 105   < > 101   MIRTHA 9.3   < > 9.3   CO2 32   < > 25   BUN 13   < > 10   CR 0.76   < > 0.67   *   < > 231*   AST  --   --  64*   ALT  --   --  45   BILITOTAL  --   --  1.2   ALBUMIN  --   --  3.9   PROTTOTAL  --   --  8.5   ALKPHOS  --   --  89    < > = values in this interval not displayed.       TSH:  TSH   Date Value Ref Range Status   06/24/2019 1.28 0.40 - 4.00 mU/L Final       Unresulted Labs Ordered in the Past 30 Days of this Admission     No orders found from 5/3/2019 to 7/3/2019.

## 2019-07-02 NOTE — PROGRESS NOTES
Initial Psychosocial Assessment    I have reviewed the chart, met with the patient, and developed Care Plan.      CTC met with pt and attempted to get information from her. Pt was hyper-focused on events that led up to her hospitalization and continues to express complete belief in them, not recognizing them as delusional or based on auditary hallucinations. CTC had a difficult time determine what pt said to be accurate and what was inaccurate due to her presentation. CTC did not complete the entire assessment with pt due to her poor reporting.    Presenting Problem:  Pt was admitted due to AH and acute paranoia, which pt denied when interviewed in the ED. Pt was brought in due to calling the police on a neighbor whom she suspected of torturing and raping a young women, which was found to be unfounded. Pt demonstrated low insight into symptoms and is suspected of not taking her meds for an indeterminate amount of time. Pt denies SI, HI, and SIB at time of admit and reported that she does not want to stay in the hospital. Pt herself reports that she has been taking her meds, but doesn't like them.    History of Mental Health and Chemical Dependency:  Mental Health Hx:  Pt has previously been asked to move due to her mental health symptoms. Most recently July 2018, due to conflict with other tenants.  Pt has called the police 3 times based on her current hallucinations and crisis twice, which informed pt she needed to call 911 if she was serious.  Pt does not appear to have insight into her mental illness and denies similar problem in the past prior to not recognizing symptoms.    Chemical Dependency Hx:  Pt is reported to have used opioid's in the past  Pt previous CD trx is unknown at this time    Family Description (Constellation, Family Psychiatric History):  Pt reports that her nephew was diagnosed with paranoid schizophrenia.   She doesn't was family involved in her care (siblings)  Pt reported having multiple  siblings, but isn't close with any of them    Significant Life Events (Illness, Abuse, Trauma, Death):  Pt reports that she had a sister that was murdered 3 years ago, by pt's nephew. Nephew also killed sister's .    Living Situation:  Live alone, independently, in an apartment; uses a rent voucher to afford.    Educational Background:  High school graduate    Occupational History:  Currently unemployed    Financial Status:  INCOME SOURCE: SSI  INSURANCE: Medicare and Comercial    Legal Issues:  Pt may be evicted from her home due to calling the  to many times  Pt is currently in re-certification of her sliding scale insulin and is concerned it won't go through    Ethnic/Cultural Issues:  Preferred Pronouns: Female    Spiritual Orientation:  None reported     Service History:  None    Current Treatment Providers are:  Pt has a pain management provider  Pt has an internal medicine provider  Pt has no other providers due to insurance issues, and doesn't want MA as that would make her ability to obtain pain management services challenging.    Social Service Assessment/Plan:  Patient has been admitted to station 10N due to needing hospitalization for safety and stabilization. Patient will have psychiatric assessment and medication management by the psychiatrist. Medications will be reviewed and adjusted per MD as indicated. The treatment team will continue to assess and stabilize the patient's mental health symptoms with the use of medications and therapeutic programming. Hospital staff will provide a safe environment and promote a therapeutic milieu. Staff will continue to assess patient as needed, informing treatment team of changes in presentation and improved status. Patient will be encouraged to participate in unit groups and activities. Patient will receive individual and group support on the unit. CTC will do individual inpatient treatment planning and after care planning with the goal of  successful community reintegration while reducing chances of recidivism. CTC will discuss options for increasing community supports with the patient. CTC will coordinate with outpatient providers and will place referrals to ensure appropriate follow up care is in place as needed.

## 2019-07-02 NOTE — PHARMACY-VANCOMYCIN DOSING SERVICE
Clinical Pharmacy - Warfarin Dosing Consult     Pharmacy has been consulted to manage this patient s warfarin therapy.  Indication: Atrial Fibrillation;DVT/PE Prophylaxis;DVT/ PE Treatment  Therapy Goal: INR 2-3  OP Anticoag Clinic: [facility]  Warfarin Prior to Admission: Yes  Warfarin PTA Regimen: 2.5 mg Friday, 5 mg ROW, per chart review (Care Everywhere)  Recent documented change in oral intake/nutrition: No    INR   Date Value Ref Range Status   07/01/2019 2.58 (H) 0.86 - 1.14 Final   06/27/2019 2.65 (H) 0.86 - 1.14 Final       Recommend warfarin today pending AM INR.  Pharmacy will monitor Samira Cullen daily and order warfarin doses to achieve specified goal.    Please contact pharmacy as soon as possible if the warfarin needs to be held for a procedure or if the warfarin goals change.

## 2019-07-02 NOTE — PROGRESS NOTES
Behavioral Health  Note    Behavioral Health  Spirituality Group Note    UNIT 10N    Name: Samira Cullen YOB: 1955   MRN: [id number] Age: 64 year old      Patient attended -led group, which included discussion of spirituality, coping with illness and building resilience.    Patient attended group for 1.0 hrs.    The patient actively participated in group discussion and patient demonstrated an appreciation of topic's application for their personal circumstances.      Sharee Quinones  Chaplain Resident  Pager  [phone]
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
## 2019-07-02 NOTE — PHARMACY-ADMISSION MEDICATION HISTORY
Admission medication history for the July 2, 2019 admission is complete.     Interview Sources:  Patient, [facility] (Margaret), [facility], and [facility] ([phone])    Reliability of Source: Moderate, patient reports discrepancies between what she is taking and what was prescribed.    Medication Adherence: Moderate, patient has not been to INR clinic since 3/7/19.    Current Outpatient Pharmacy: [facility]    Changes made to PTA medication list (reason)  Added:   1. Alclometasone cream 0.05%, Apply two times a day as needed for rash (recently filled at pharmacy, patient reports she has not picked up yet but is supposed to start using)  2. Fluocinonide 0.05% solution, Apply 10-20 drops to scalp at bedtime as directed (recently filled at pharmacy, patient reports she has not picked up yet but is supposed to start using)    Deleted: polyethylene glycol packet, Take 1 packet daily as needed (patient reports no longer taking)    Changed:   1. Tizanidine 4 mg tablet changed from 4 mg two times a day as needed for 4 mg at dinner and 4-8 mg at bedtime as directed (Pharmacy reported discrepancy, patient reports taking as needed)  2. Acetaminophen changed from 500 mg two times a day as needed for 325-650 mg two times a day as needed (patient reported discrepancy)  3. Furosemide changed from 20 mg daily as needed for 40 mg daily (patient reports she is supposed to be taking 40 mg daily but she is only taking as needed, [facility] note from 6/7/19 states to increase to 40 mg daily.   4. Hydrocodone- acetaminophen 5-325 mg tablet, changed from 1 tablet three times a day to 1 tablet three times a day as needed (pharmacy reported discrepancy)      Additional medication history information:   - Furosemide: Patient was taking 20 mg daily as needed. She reports she is now supposed to be taking 40 mg daily. She is currently only taking 40 mg daily as needed but trying to take 40 mg daily every day. From [facility]  note on 6/7/19, she was supposed to increase to 40 mg daily. Last filled furosemide 20 mg tablets on 1/11/19 for 90 tablets    - Metformin: Patient reports taking metformin 500 mg extended-release tablets three times a day with meals. Last prescription was filled for metformin immediate-release 500 mg  tablets on 5/30/19 with directions of take 2 tablets two times a day. Patient last filled metformin 500 mg extended-release tablets on 3/21/19 for 180 tablets with directions of take 2 tablets daily. Patient reports she switched from immediate-release tablets to extended-release tablets due to increased diarrhea with the immediate-release tablets. Per patient outreach note on 5/22, patient can switch to extended-release tablets starting at 1500 mg daily and can increase to 2000 mg daily after 1 to 2 weeks. Patient reports currently taking 1500 mg ER daily but splitting up the dose and taking 500 mg ER three times a day.    - Metoprolol: Patient last filled metoprolol tartrate 50 mg tablets with directions of 50 mg two times a day. Patient reports this was decreased to 25 mg two times a day due to low heart rate. Decreased on 4/19/19, see telephone encounter from Dr. Metz at [facility].    - Potassium: per patient she is supposed to take with furosemide but ran out of medication. Unable to find any recent pharmacy fills for potassium    - Warfarin: Last anticoagulation note was on 4/4/19 from [facility]. Patient states she has been taking 5 mg daily, last discharged from [place] on 6/27/19. Per the discharge summary, patient was instructed to take 5 mg daily. Patient missed her warfarin dose on Monday (7/1) per patient.     Prior to Admission Medication List:  Prior to Admission medications    Medication Sig Last Dose Taking? Auth Provider   acetaminophen (TYLENOL) 325 MG tablet Take 325-650 mg by mouth 2 times daily as needed for mild pain 7/1/2019 Yes Unknown, Entered By History   albuterol  (PROAIR HFA/PROVENTIL HFA/VENTOLIN HFA) 108 (90 Base) MCG/ACT inhaler Inhale 2 puffs into the lungs every 6 hours as needed for shortness of breath / dyspnea or wheezing 6/30/2019 Yes Unknown, Entered By History   ARIPiprazole (ABILIFY) 5 MG tablet Take 1 tablet (5 mg) by mouth every morning 7/1/2019 Yes Tai Chan MD   atorvastatin (LIPITOR) 40 MG tablet Take 1 tablet (40 mg) by mouth every evening 6/30/2019 Yes Sudeep Willis MD   baclofen (LIORESAL) 20 MG tablet Take 20 mg by mouth 3 times daily. 7/1/2019 Yes Reported, Patient   cetirizine (ZYRTEC) 10 MG tablet Take 10 mg by mouth daily 7/1/2019 at Unknown time Yes Reported, Patient   diclofenac (VOLTAREN) 1 % GEL Apply topically daily as needed for moderate pain (Apply to back)  6/30/2019 Yes Reported, Patient   DULoxetine (CYMBALTA) 60 MG capsule Take 60 mg by mouth 2 times daily 7/1/2019 Yes Unknown, Entered By History   furosemide (LASIX) 20 MG tablet Take 40 mg by mouth daily (Patient takes only as needed, but is supposed to be taking daily) 6/30/2019 Yes Unknown, Entered By History   HYDROcodone-acetaminophen (NORCO) 7.5-325 MG per tablet Take 1 tablet by mouth 3 times daily as needed for pain Max of 3 tablets per day 7/1/2019 Yes Reported, Patient   hydrocortisone 1 % CREA cream Apply topically 2 times daily as needed for other (to face) 7/1/2019 Yes Unknown, Entered By History   metFORMIN (GLUCOPHAGE-XR) 500 MG 24 hr tablet Take 500 mg by mouth 3 times daily 7/1/2019 Yes Reported, Patient   metoprolol tartrate (LOPRESSOR) 25 MG tablet Take 25 mg by mouth 2 times daily 7/1/2019 Yes Reported, Patient   multivitamin, therapeutic (THERA-VIT) TABS tablet Take 1 tablet by mouth daily 7/1/2019 Yes Unknown, Entered By History   omeprazole (PRILOSEC) 20 MG DR capsule Take 20 mg by mouth 2 times daily 7/1/2019 Yes Unknown, Entered By History   Pregabalin (LYRICA) 200 MG capsule Take 200 mg by mouth 3 times daily 7/1/2019 Yes Unknown, Entered By History    tiZANidine (ZANAFLEX) 4 MG tablet Take 4 mg by mouth at dinner time and 4-8 mg by mouth at bedtime as directed (Patient states she uses as needed) 7/1/2019 Yes Reported, Patient   warfarin (COUMADIN) 5 MG tablet Take 5 mg by mouth daily 6/30/2019 Yes Unknown, Entered By History   alclomethasone (ACLOVATE) 0.05 % external cream Apply topically 2 times daily as needed (rash) Not started yet  Unknown, Entered By History   alcohol swab prep pads Use to swab area of injection/julio cesar as directed.   Sudeep Willis MD   blood glucose calibration (NO BRAND SPECIFIED) solution Use to calibrate blood glucose monitor as needed as directed.   Sudeep Willis MD   blood glucose monitoring (NO BRAND SPECIFIED) meter device kit Use to test blood sugar once daily in the morning   Sudeep Willis MD   blood glucose monitoring (NO BRAND SPECIFIED) test strip Use to test blood sugar once daily in the morning before breakfast   Sudeep Willis MD   fluocinonide (LIDEX) 0.05 % external solution Apply 10-20 drops to scalp at bedtime as directed Not started yet  Unknown, Entered By History   potassium chloride SA (K-DUR/KLOR-CON M) 10 MEQ CR tablet Take 20 mEq by mouth daily as needed When takes furosemide More than a month  Reported, Patient   thin (NO BRAND SPECIFIED) lancets Use with lanceting device.   Sudeep Willis MD       Time spent: 60 minutes    Medication history completed by:   Carol Roman, Pharmacy Student

## 2019-07-02 NOTE — PROGRESS NOTES
Per pt's request, writer called [facility] [phone], to cancel her appointment today at 1030am.

## 2019-07-03 NOTE — DISCHARGE INSTRUCTIONS
Behavioral Discharge Planning and Instructions      Summary:  You were admitted on 7/2/2019  due to Delusional Thoughts.  You were treated by Dr. Eliseo Steiner MD and discharged on 07/03/9 from [facility]    Principal Diagnosis:   Delusional thoughts.   Historical diagnosis of major depressive disorder, recurrent.   Chronic opioid use with possible dependence.   Rule out intermittent delirium/confusion of iatrogenic nature, secondary to heavy narcotic use.    Health Care Follow-up Appointments:   [facility]  Psychiatry, Med Management  Date/Time: Check in at 8:30, on July 23rd 2019 (interview is at 9:00)  Provider: Dr. Chito Mcneill   Address: [address] [address]  Phone: [phone]  Fax: [phone]    [facility]  Therapy  Date/Time: Check in at 10:30, on July 23rd 2019 (appointment at 11:00)  Provider: Puja Gomez  Address: [address] [address]  Phone: [phone]  Fax: [phone]    Make an appointment with your PCP within a week to check your INR.     Attend all scheduled appointments with your outpatient providers. Call at least 24 hours in advance if you need to reschedule an appointment to ensure continued access to your outpatient providers.   Major Treatments, Procedures and Findings:  You were provided with: a psychiatric assessment, assessed for medical stability, medication evaluation and/or management, group therapy and milieu management    Symptoms to Report: feeling more aggressive, increased confusion or mood getting worse    Early warning signs can include: increased depression or anxiety sleep disturbances increased unusual thinking, such as paranoia or hearing voices    Safety and Wellness:  Take all medicines as directed.  Make no changes unless your doctor suggests them.      Follow treatment recommendations.  Refrain from alcohol and non-prescribed drugs.  If there is a concern for safety, call  532.    Resources:   Crisis Intervention: 640.843.4719 or 123-256-9771 (TTY: 862.822.9882).  Call anytime for help.  [employer] [place] [employer] (www.[place].org): 454.845.8079 or 322-798-2876.  Mental Health Consumer/Survivor Network of [place] (www.mhcsn.net): 447.773.3313 or 701-605-7520  [employer] [place] (www.mentalhealth.org): 309.162.9776 or 175-319-3342  Self- Management and Recovery Training., [employer]-- Toll free: 112.974.9203  www.[employer].org  [facility] [facility] Crisis Team:  [phone]    The treatment team has appreciated the opportunity to work with you.     If you have any questions or concerns our unit number is [phone]

## 2019-07-03 NOTE — PLAN OF CARE
Pt to be discharged home today. Writer went through discharge instructions/paperwork with patient. Discharge teaching, including follow up care and medication teaching, complete and patient verbalized understanding. Patient is able to contract for safety and denies SI/SIB/HI. Patient has an envelope in unit lock box and her belongings in the locker. She is waiting for her prescribed medication from pharmacy and will be cabbed home once she she gets it.     1536. Patient discharged home with her belongings and prescribed medications. Transportation provided by the cab.

## 2019-07-03 NOTE — PLAN OF CARE
"  Problem: Psychotic Symptoms  Goal: Psychotic Symptoms  Description  Signs and symptoms of listed problems will be absent or manageable.  7/2/2019 2033 by Sherly Sparks RN  Outcome: No Change    Pt states she wants to discharge back to her apartment tomorrow. [place] [facility] [place]. Will need to be cabbed back home. Pt only needs new dose of Abilify ordered from discharge pharmacy. Stated she participated in group.  Full range affect. Anxious mood.  Rates anxiety as 7/10 with 10 being high and depression as yes for the last 20 years due to pain. \"yes\" to racing thought.  States poor concentration for the last year. Denies psychotic symptoms and states she heard real voices upstairs at her apartment. Denies SI and SIB. \"yes\" to racing thoughts. Pt states her appetite is better in the hospital due to her no having to cook. Encouraged pt to check her BG BID at home and eat less sugary cereal and oatmeal.  Pt states she usually has 3 bowls of cereal of frosted mini wheat's or honey bunches of oats and sometimes packaged oatmeal. Encouraged pt to watch what she eats and eat less sugary cereal or lower sugar oatmeal. Pt refused steel oats or regular oatmeal. Said she is tired all the time and is able to fall asleep but wakes up about 3 hours later and finds it difficult to fall back to sleep. Plans on getting a sleep study and stated her CPAP never worked. Pt had increased pain last night but received her pain medications later than normal due to being hospitalized.  Pt uses a walker and is steady with that. Fall bracelet placed this shift.      "

## 2019-07-03 NOTE — DISCHARGE SUMMARY
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  Judgment:  intact  Oriented to:  time, person, and place  Attention Span and Concentration:  intact  Recent and Remote Memory:  intact  Language and Fund of Knowledge: appropriate  Muscle Strength and Tone: normal  Gait and Station: Normal  Vitals:    07/02/19 1625 07/02/19 2124 07/02/19 2126 07/03/19 0757   BP: 142/79 112/76 112/76    BP Location:  Left arm     Pulse: 69 74 74    Resp:  16     Temp: 98.4  F (36.9  C) 97.2  F (36.2  C)  97  F (36.1  C)   TempSrc:  Tympanic  Tympanic   SpO2:       Weight:       Height:              Discharge Plan:     Health Care Follow-up Appointments:   [facility]  Psychiatry, Med Management  Date/Time: Check in at 8:30, on July 23rd 2019 (interview is at 9:00)  Provider: Dr. Chito Mcneill   Address: [address] [address]  Phone: [phone]  Fax: [phone]     [facility]  Therapy  Date/Time: Check in at 10:30, on July 23rd 2019 (appointment at 11:00)  Provider: Puja Gomez  Address: [address] [facility] [address] [facility] [address]  Phone: [phone]  Fax: [phone]     Make an appointment with your PCP within a week to check your INR.     Resources:   Crisis Intervention: [phone] " or [phone] (TTY: [phone]).  Call anytime for help.  [employer] [place] [employer] (www.[place].rigo.org): 627.509.7259 or 848-160-3631.  Mental Health Consumer/Survivor Network of [place] (www.mhcsn.net): 514.731.7353 or 279-152-2967  Mental Health Association of [place] (www.mentalhealth.org): 971.786.2516 or 654-262-8947  Self- Management and Recovery Training., SMART-- Toll free: [phone]  www.SAIC.org  [facility] Mental [facility] Crisis Team:  [phone]     Attestation:  The patient has been seen and evaluated by me,  Eliseo Steiner MD  '

## 2019-07-03 NOTE — PROGRESS NOTES
LATRICIA (writer) spoke with pt about discharge aftercare options. Pt requested seeing a therapist at where her psychiatrist is to make transportation more tenable. Pt was cooperative and was accepting of aftercare recommendation of therapy and psychiatry. Pt requested a letter to share with her landlord to prove that she was in the hospital and had been provided resources for aftercare. CTC provided this to pt to confirm it is what she wanted, which pt confirmed. Letter was given to RN for pt to take home at discharge.

## 2019-07-04 NOTE — PROGRESS NOTES
07/03/19 1041   Occupational Therapy   Type of Intervention structured groups   Response Participates with cues/redirection   Hours 1

## 2021-05-24 ENCOUNTER — RECORDS - HEALTHEAST (OUTPATIENT)
Dept: ADMINISTRATIVE | Facility: CLINIC | Age: 66
End: 2021-05-24

## 2021-05-25 ENCOUNTER — RECORDS - HEALTHEAST (OUTPATIENT)
Dept: ADMINISTRATIVE | Facility: CLINIC | Age: 66
End: 2021-05-25

## 2021-05-26 ENCOUNTER — RECORDS - HEALTHEAST (OUTPATIENT)
Dept: ADMINISTRATIVE | Facility: CLINIC | Age: 66
End: 2021-05-26

## 2021-06-01 ENCOUNTER — RECORDS - HEALTHEAST (OUTPATIENT)
Dept: ADMINISTRATIVE | Facility: CLINIC | Age: 66
End: 2021-06-01

## 2022-02-12 VITALS
SYSTOLIC BLOOD PRESSURE: 110 MMHG | HEART RATE: 74 BPM | DIASTOLIC BLOOD PRESSURE: 50 MMHG | BODY MASS INDEX: 42.06 KG/M2 | WEIGHT: 237.4 LBS | TEMPERATURE: 97.4 F | HEIGHT: 63 IN

## 2022-02-12 VITALS
TEMPERATURE: 98.2 F | HEART RATE: 66 BPM | WEIGHT: 225 LBS | SYSTOLIC BLOOD PRESSURE: 133 MMHG | HEIGHT: 63 IN | BODY MASS INDEX: 39.87 KG/M2 | DIASTOLIC BLOOD PRESSURE: 80 MMHG

## 2022-02-15 NOTE — PROGRESS NOTES
Patient:   KARLA ANDRADE            MRN: [id number]            FIN: [id number]               Age:   62 years     Sex:  Female     :  1955   Associated Diagnoses:   A-Fib; Cervical radiculopathy; Complex regional pain syndrome; History of pulmonary embolism; HTN (hypertension); Obese; FERNANDO (obstructive sleep apnea)   Author:   Robles Matt MD      Visit Information      Date of Service: 2017 02:30 pm  Performing Location: [facility]  Encounter#: [id number]      Primary Care Provider (PCP):  Esha Goode MD    NPI# [id number]      Referring Provider:  No referring provider recorded for selected visit.      Chief Complaint            Additional Information:No additional information recorded during visit.   Chief complaint and symptoms as noted above and confirmed with patient.  Recent lab and diagnostic studies reviewed with patient      History of Present Illness   2017: Presents to clinic for follow-up.  She is established with the pain clinic in [place] where she is currently being prescribed Nucynta.  Complaints of persistent lower extremity neuropathic pains.  Maintained on Lyrica 150 mg 3 times a day which he does not find overly helpful.  She has trialed gabapentin in the past.  She resides at home.  No barriers involving stairs.  Reportedly does have frequent falling.  Reviewed labs from February of this year.         Review of Systems   Constitutional:  Weakness, Decreased activity, No fever, No chills.    Eye:  Negative except as documented in history of present illness.    Ear/Nose/Mouth/Throat:  Negative except as documented in history of present illness.    Respiratory:  No shortness of breath.    Cardiovascular:  Peripheral edema, No chest pain, No palpitations, No syncope.    Gastrointestinal:  No nausea, No vomiting, No abdominal pain.    Genitourinary:  No dysuria, No hematuria.    Hematology/Lymphatics:  Negative except as documented in history of present  illness.    Endocrine:  No excessive thirst, No polyuria.    Immunologic:  No recurrent fevers.    Musculoskeletal:  Back pain, Joint pain, No muscle pain.    Neurologic:  Alert and oriented X4, Abnormal balance, No numbness, No tingling, No headache.       Health Status   Allergies:    Allergic Reactions (Selected)  Severity Not Documented  ACE inhibitors (No reactions were documented)  Cats (No reactions were documented)  Dust (No reactions were documented)  Mold (No reactions were documented)  Morphine (No reactions were documented)  Pollen (No reactions were documented)   Medications:  (Selected)   Prescriptions  Prescribed  Cymbalta 60 mg oral delayed release capsule: 1 cap(s) ( 60 mg ), po, bid, # 60 cap(s), 3 Refill(s), Type: Maintenance, Pharmacy: [id number], 1 cap(s) po bid,x30 day(s)  GlycoLax oral powder for reconstitution: See Instructions, Instructions: DISSOLVE 17 GRAMS IN 8 OUNCES OF WATER OR JUICE DAILY., # 527 gm, 1 Refill(s), Type: Maintenance, Pharmacy: [id number], DISSOLVE 17 GRAMS IN 8 OUNCES OF WATER OR JUICE DAILY.  Lasix 20 mg oral tablet: 2 tab(s) ( 40 mg ), PO, Daily, # 60 tab(s), 3 Refill(s), Type: Maintenance, Pharmacy: [id number], 2 tab(s) po daily,x30 day(s)  Lipitor 10 mg oral tablet: 1 tab(s) ( 10 mg ), PO, Daily, # 30 tab(s), 3 Refill(s), Type: Maintenance, Pharmacy: [id number], 1 tab(s) po daily,x30 day(s)  Ventolin HFA 90 mcg/inh inhalation aerosol: 2 puff(s), inh, q4 hrs, PRN: as needed for wheezing, # 1 EA, 0 Refill(s), Pharmacy: [id number], 2 puff(s) inh q4 hrs,PRN:as needed for wheezing  baclofen 20 mg oral tablet: 1 tab(s) ( 20 mg ), po, tid, # 90 tab(s), 3 Refill(s), Type: Soft Stop, Pharmacy: [id number], 1 tab(s) po tid,x30 day(s)  cetirizine 10 mg oral tablet: 1 tab(s) ( 10 mg ), PO, Daily, # 30 tab(s), 0 Refill(s), Type: Maintenance, Pharmacy: [id number], 1 tab(s) po daily  ketoconazole 2% topical  shampoo: 1 farhat, TOP, 2x/Wk, # 120 mL, 0 Refill(s), Type: Soft Stop, Pharmacy: [id number], 1 farhat top 2x/wk  lidocaine 5% topical ointment: 1 farhat, TOP, TID, # 50 g, 0 Refill(s), Type: Maintenance, Pharmacy: [id number], 1 farhat top tid  metoprolol tartrate 50 mg oral tablet: 1 tab(s) ( 50 mg ), PO, BID, # 60 tab(s), 3 Refill(s), Type: Maintenance, Pharmacy: [facility], 1 tab(s) po bid,x30 day(s)  omeprazole 20 mg oral delayed release capsule: 1 cap(s) ( 20 mg ), po, bid, # 60 cap(s), 3 Refill(s), Type: Maintenance, Pharmacy: [facility], 1 cap(s) po bid,x30 day(s)  potassium chloride 10 mEq oral capsule, extended release: 1 cap(s) ( 10 mEq ), po, bid, # 60 cap(s), 0 Refill(s), Type: Maintenance, Pharmacy: [facility], 1 cap(s) po bid  tiZANidine 4 mg oral tablet: 2 tab(s) ( 8 mg ), PO, q8hr, # 180 tab(s), 3 Refill(s), Type: Maintenance, Pharmacy: [id number], 2 tab(s) po q8 hrs,x30 day(s)  tubigrip stockings knee high, 20-30 mmHg compression: tubigrip stockings knee high, 20-30 mmHg compression, See Instructions, Instructions: On in AM, off at HS., Supply, # 2 EA, 1 Refill(s), Type: Maintenance, Pharmacy: [facility], On in AM, off at HS.  warfarin 5 mg oral tablet: See Instructions, Instructions: TAKE ONE TABLET BY MOUTH ONE TIME DAILY, # 30 tab(s), 3 Refill(s), Type: Maintenance, Pharmacy: [id number], TAKE ONE TABLET BY MOUTH ONE TIME DAILY  Documented Medications  Documented  Desitin Rapid Relief 13% topical cream: 1 farhat, top, tid, PRN: for rash, 0 Refill(s), Type: Maintenance  Lyrica 150 mg oral capsule: 1 cap(s) ( 150 mg ), po, tid, 0 Refill(s), Type: Maintenance  Nucynta 75 mg oral tablet: 1 tab(s) ( 75 mg ), po, q6 hrs, 0 Refill(s), Type: Maintenance  Patanol 0.1% ophthalmic solution: 1 drop(s), both eyes, bid, 0 Refill(s), Type: Maintenance  Senna Plus: 1-2 tab(s), po, hs, PRN: for constipation, 0 Refill(s), Type: Maintenance  Vitamin C:  daily, 0 Refill(s), Type: Maintenance  Voltaren 1% topical gel: ( 2 gm ), top, qid, 0 Refill(s), Type: Maintenance  acetaminophen 500 mg oral tablet: See Instructions, Instructions: Take 1-2 tablets by mouth 3 times daily if needed. Max acetaminophen dose: 4000mg in 24 hrs.     Doctor's comments:  For improved non-narcotic pain relief.  Max of 6/day.  If using Vicodin, use less Tylenol to avoid goi...  aspirin 81 mg oral tablet: 1 tab(s) ( 81 mg ), PO, Daily, 0 Refill(s), Type: Maintenance  hydrALAZINE 10 mg oral tablet: 1 tab(s) ( 10 mg ), po, q6 hrs, PRN: SBP > 180, 0 Refill(s), Type: Maintenance  simethicone 125 mg oral capsule: 1 cap(s) ( 125 mg ), po, bid, PRN: for gas, 0 Refill(s), Type: Maintenance   Problem list:    All Problems  Anemia, unspecified / SNOMED CT 690178954 / Confirmed  Anxiety / SNOMED CT [id number] / Confirmed  Cervical radiculopathy / SNOMED CT 166504248 / Confirmed  A-Fib / SNOMED CT 8673562519 / Confirmed  Chronic pain syndrome / SNOMED CT 1866953507 / Confirmed  Complex regional pain syndrome / SNOMED CT 0412493991 / Confirmed  Proteins serum plasma low / SNOMED CT 858733210 / Confirmed  GERD (gastroesophageal reflux disease) / SNOMED CT 1437943232 / Confirmed  Anticoagulated / SNOMED CT 437335760 / Confirmed  History of pulmonary embolism / SNOMED CT 355288835 / Confirmed  History of non-ST elevation myocardial infarction (NSTEMI) / SNOMED CT 2332465389 / Confirmed  HTN (hypertension) / SNOMED CT 7678194681 / Confirmed  Mild major depression / SNOMED CT 670824830 / Confirmed  Obese / SNOMED CT 9354249653 / Probable  FERNANDO (obstructive sleep apnea) / SNOMED CT 160814797 / Confirmed  Opioid type dependence / SNOMED CT 865945676 / Confirmed  Paranoid schizophrenia / SNOMED CT 942097820 / Confirmed  Teeth dysplasia / SNOMED CT 3290702987 / Confirmed  Total replacement of left knee joint / SNOMED CT 7709613434 / Confirmed  DM (diabetes mellitus), type 2 / SNOMED CT  164546408 / Confirmed  Urinary Incontinence / ICD-9-.3 / Confirmed  Resolved: *Hospitalized@[facility] - PE, hypertension, complex regional pain syndrome  Resolved: *Hospitalized@[facility] - Cecal Volvulus  Resolved: *Hospitalized@[facility] - Syncope  Resolved: History of DVT (deep vein thrombosis) / SNOMED CT [id number]  Resolved: Inpatient stay / SNOMED CT 581532954  Canceled: Anxiety / ICD-9-.00  Canceled: Diabetes Mellitus, Type 2 / ICD-9-.00  Canceled: history of regional complex pain  Canceled: Complex Regional Pain Syndrome / SNOMED CT 703479709  Canceled: PE (Pulmonary Embolism) / ICD-9-.19      Histories   Past Medical History:    Active  Paranoid schizophrenia (881047728): Onset on 6/28/2012 at 57 years.  Cervical radiculopathy (224276916): Onset on 6/28/2012 at 57 years.  FERNANDO (obstructive sleep apnea) (937818593)  HTN (hypertension) (7985493801)  Mild major depression (769065071)  GERD (gastroesophageal reflux disease) (3929640754)  Obese (2141143965)  Anticoagulated (199522586)  Proteins serum plasma low (653304754)  Urinary Incontinence (788.3)  Anxiety ([id number])  History of pulmonary embolism (879359336)  Complex regional pain syndrome (7095832017)  DM (diabetes mellitus), type 2 (224214333)  History of non-ST elevation myocardial infarction (NSTEMI) (2390883016)  Anemia, unspecified (109143086)  Opioid type dependence (401507604)  Resolved  Inpatient stay (046596390): Onset on 9/2/2016 at 61 years.  Resolved on 9/3/2016 at 61 years.  Comments:  9/14/2016 CDT 10:26 PM CDT - Elise Santos  [facility] - Weakness  *Hospitalized@[facility] - Cecal Volvulus: Onset on 5/7/2014 at 59 years.  Resolved on 5/22/2014 at 59 years.  *Hospitalized@[facility] - Syncope: Onset on 7/2/2012 at 57 years.  Resolved.  *Hospitalized@[facility] - PE, hypertension, complex regional pain syndrome: Onset on 6/2/2012 at 57 years.  Resolved.  History of DVT (deep vein thrombosis)  "([id number]):  Resolved.   Family History:    Diabetes mellitus  Sister     Procedure history:    Meatoplasty of ear (285235369) on 10/29/2015 at 60 Years.  Comments:  9/8/2016 11:08 AM - Anastasiya Peraza  right  Extracapsular cataract extraction and insertion of intraocular lens (884379717) on 6/11/2015 at 60 Years.  Comments:  1/19/2016 2:14 PM - Sailaja Morris  Left.  Extracapsular cataract extraction and insertion of intraocular lens (056312986) on 5/28/2015 at 60 Years.  Comments:  6/9/2015 8:29 AM - Sailaja Morris  Right.  Lumbar spinal fusion (66871296) on 4/8/2015 at 60 Years.  Right hemicolectomy (110888912) on 5/7/2014 at 59 Years.  Exploratory laparotomy (510704106) on 5/7/2014 at 59 Years.  Arthroplasty of knee (33405924) on 4/28/2014 at 59 Years.  Comments:  5/9/2014 7:45 AM - Elise Santos  Right total knee arthroplasty.  Excision of scalp lesion with double flap closure on 1/20/2010 at 54 Years.  Hysterectomy on 1/1/2002 at 46 Years.  Left knee surgery on 1/1/1999 at 43 Years.  Breast reduction on 1/1/1994 at 38 Years.  Repair of bladder (948356773).  Implanted dorsal column stimulator.  Comments:  12/11/2013 2:14 PM - Iván Fay  removed in 2004  Ear ulcer grafted 2011.  Left knee replaced 12/2013.  Cecal volvulus ([id number]).  Back surgery 4/2015.  Ablation per patient report 4/2015.  Cervical spinal fusion (020262293).   Social History:        Alcohol Assessment: Denies Alcohol Use            \"rare use\"      Tobacco Assessment: Denies Tobacco Use            Never smoker      Employment and Education Assessment            Unemployed                     Comments:                      06/02/2012 - Ev LOPES, Jose                     Disabled      Home and Environment Assessment            Marital status: .        Physical Examination   vital signs stable, as noted above   VS/Measurements   General:  Alert and oriented, No acute distress.  "   Eye:  Extraocular movements are intact.    HENT:  Normocephalic, Oral mucosa is moist.    Neck:  Supple.    Respiratory:  Lungs are clear to auscultation, Respirations are non-labored.    Cardiovascular:  Normal rate, Regular rhythm, No murmur, 2+ pitting edema.    Gastrointestinal:  Soft, Non-distended, Normal bowel sounds.    Musculoskeletal:  Normal range of motion, Normal strength, No tenderness.    Neurologic:  Alert, Oriented, Normal motor function, No focal deficits.    Cognition and Speech:  Oriented, Speech clear and coherent.    Psychiatric:  Appropriate mood & affect.       Review / Management   Results review:  Lab results   4/21/2017 3:25 PM CDT INR 2.8   2/23/2017 2:41 PM CST INR 2.0   2/23/2017 11:49 AM CST Sodium Level 142 mmol/L    Potassium Level 3.8 mmol/L    Chloride Level 104 mmol/L    CO2 Level 30 mmol/L    Glucose Level 112 mg/dL  HI    BUN 12 mg/dL    Creatinine 0.93 mg/dL    BUN/Creat Ratio NOT APPLICABLE    eGFR 65 mL/min/1.73m2    eGFR African American 76 mL/min/1.73m2    Calcium Level 9.2 mg/dL    Hgb A1c 6.0  HI    Cholesterol 156 mg/dL    Non-    HDL 33 mg/dL  LOW    Chol/HDL Ratio 4.7    LDL 78    Triglyceride 225 mg/dL  HI    U Microalbumin 1.4 mg/dL    Ur Creatinine 291 mg/dL    Ur Microalb/Creat Ratio 5    PT 20.0  HI    INR 2.0  HI   2/7/2017 2:20 PM CST PT 35.2  HI    INR 3.6  HI   1/16/2017 2:53 PM CST INR 3.2   .       Impression and Plan   Diagnosis     A-Fib (IUE49-OK I48.2).     Cervical radiculopathy (AJE53-KN M54.12).     Complex regional pain syndrome (KKV46-JN G90.50).     History of pulmonary embolism (YCH99-WZ Z86.711).     HTN (hypertension) (WDZ69-ZF I10).     Obese (XHO57-KP E66.9).     FERNANDO (obstructive sleep apnea) (NJQ31-KN G47.33).         Extended hospitalization in '15 at [facility], found obtunded on her floor by son   - CVA, felt likely cardioembolic - treated with acute thrombectomy   - MI, seen by cardiology - medical management only   - remote  history of PE, bilateral DVTs seen during admission; resultant venous stasis   - maintained on warfarin    .) recurrent falls; left SNF against facility recommendations   - living independently, though sounds like fall risk persists   - pt still exploring living options in [place] senior/disabled housing    .) complex regional pain syndrome - followed through [place]   - maintained on Nucynta 75mg q6hr   - Lyrica 150mg TID  - baclofen 20mg TID    - defer to pain clinic on any further interventional therapies including related to peripheral neuropathy    .) h/o recurrent thromboembolism   - maintained on warfarin     .) lower extremity edema   - if not diuretic responsive, suspect mainly related to venous insufficiency from h/o DVT    Overall, an unfortunate woman with generally poor health, and doubt has the appropriate resources or insight to maintain independent living      Professional Services   Counseling Summary:  This was a 25 minute visit with greater than 50% of that time spent counseling the patient.

## 2022-02-15 NOTE — PROGRESS NOTES
Patient:   KARLA ANDRADE            MRN: [id number]            FIN: [id number]               Age:   61 years     Sex:  Female     :  1955   Associated Diagnoses:   A-Fib; Cervical radiculopathy; Complex regional pain syndrome; History of pulmonary embolism; HTN (hypertension); Obese; FERNANDO (obstructive sleep apnea)   Author:   Jeremy Alvarez MD      Visit Information      Date of Service: 2017 01:29 pm  Performing Location: [facility]  Encounter#: [id number]      Primary Care Provider (PCP):  Esha Goode MD    NPI# [id number]      Referring Provider:  No referring provider recorded for selected visit.      Chief Complaint   2017 1:33 PM CST     Medication follow up, establish care              Additional Information:No additional information recorded during visit.      History of Present Illness   chief complaint and symptoms as noted above confirmed with patient   Never filled out paperwork for pain clinic  No falls  No med changes        Review of Systems   Constitutional:  Weakness, Decreased activity, No fever, No chills.    Eye:  Negative except as documented in history of present illness.    Ear/Nose/Mouth/Throat:  Negative except as documented in history of present illness.    Respiratory:  No shortness of breath.    Cardiovascular:  Peripheral edema, No chest pain, No palpitations, No syncope.    Gastrointestinal:  No nausea, No vomiting, No abdominal pain.    Genitourinary:  No dysuria, No hematuria.    Hematology/Lymphatics:  Negative except as documented in history of present illness.    Endocrine:  No excessive thirst, No polyuria.    Immunologic:  No recurrent fevers.    Musculoskeletal:  Back pain, Joint pain, No muscle pain.    Neurologic:  Alert and oriented X4, No numbness, No tingling, No headache.       Health Status   Allergies:    Allergic Reactions (Selected)  Severity Not Documented  ACE inhibitors (No reactions were documented)  Cats (No  reactions were documented)  Dust (No reactions were documented)  Mold (No reactions were documented)  Morphine (No reactions were documented)  Pollen (No reactions were documented)   Medications:  (Selected)   Prescriptions  Prescribed  DULoxetine 60 mg oral delayed release capsule: 1 cap(s) ( 60 mg ), po, daily, # 30 cap(s), 0 Refill(s), Type: Soft Stop, Pharmacy: [id number], 1 cap(s) po daily  GlycoLax oral powder for reconstitution: See Instructions, Instructions: DISSOLVE 17 GRAMS IN 8 OUNCES OF WATER OR JUICE DAILY., # 527 gm, 0 Refill(s), Type: Maintenance, Pharmacy: [id number], DISSOLVE 17 GRAMS IN 8 OUNCES OF WATER OR JUICE DAILY.  Lasix 20 mg oral tablet: 2 tab(s) ( 40 mg ), PO, Daily, # 60 tab(s), 0 Refill(s), Type: Maintenance, Pharmacy: [id number], 2 tab(s) po daily  Lipitor 10 mg oral tablet: 1 tab(s) ( 10 mg ), PO, Daily, # 30 tab(s), 0 Refill(s), Type: Maintenance, Pharmacy: [id number], 1 tab(s) po daily  Lyrica 100 mg oral capsule: 1 cap(s) ( 100 mg ), po, tid, # 90 cap(s), 0 Refill(s), Type: Maintenance  Ventolin HFA 90 mcg/inh inhalation aerosol: 2 puff(s), inh, q4 hrs, PRN: as needed for wheezing, # 1 EA, 0 Refill(s), Pharmacy: [id number], 2 puff(s) inh q4 hrs,PRN:as needed for wheezing  baclofen 20 mg oral tablet: 1 tab(s) ( 20 mg ), po, tid, # 90 tab(s), 0 Refill(s), Type: Soft Stop, Pharmacy: [id number], 1 tab(s) po tid  cetirizine 10 mg oral tablet: 1 tab(s) ( 10 mg ), PO, Daily, # 30 tab(s), 0 Refill(s), Type: Maintenance, Pharmacy: [id number], 1 tab(s) po daily  ketoconazole 2% topical shampoo: 1 farhat, TOP, 2x/Wk, # 120 mL, 0 Refill(s), Type: Soft Stop, Pharmacy: [id number], 1 farhat top 2x/wk  lidocaine 5% topical ointment: 1 farhat, TOP, TID, # 50 g, 0 Refill(s), Type: Maintenance, Pharmacy: [id number], 1 farhat top tid  metoprolol tartrate 50 mg oral tablet: 1 tab(s) ( 50 mg ), PO, BID, # 60 tab(s), 0  Refill(s), Type: Maintenance, Pharmacy: [id number], 1 tab(s) po bid  omeprazole 20 mg oral delayed release capsule: 1 cap(s) ( 20 mg ), po, bid, # 60 cap(s), 0 Refill(s), Type: Maintenance, Pharmacy: [id number], 1 cap(s) po bid  potassium chloride 10 mEq oral capsule, extended release: 1 cap(s) ( 10 mEq ), po, bid, # 60 cap(s), 0 Refill(s), Type: Maintenance, Pharmacy: [id number], 1 cap(s) po bid  tubigrip stockings knee high, 20-30 mmHg compression: tubigrip stockings knee high, 20-30 mmHg compression, See Instructions, Instructions: On in AM, off at HS., Supply, # 2 EA, 1 Refill(s), Type: Maintenance  warfarin 5 mg oral tablet: 1 tab(s) ( 5 mg ), PO, Daily, # 30 tab(s), 0 Refill(s), Type: Maintenance, Pharmacy: [id number], 1 tab(s) po daily  Documented Medications  Documented  Desitin Rapid Relief 13% topical cream: 1 farhat, top, tid, PRN: for rash, 0 Refill(s), Type: Maintenance  Patanol 0.1% ophthalmic solution: 1 drop(s), both eyes, bid, 0 Refill(s), Type: Maintenance  Senna Plus: 1-2 tab(s), po, hs, PRN: for constipation, 0 Refill(s), Type: Maintenance  Vitamin C: daily, 0 Refill(s), Type: Maintenance  Voltaren 1% topical gel: ( 2 gm ), top, qid, 0 Refill(s), Type: Maintenance  acetaminophen 500 mg oral tablet: See Instructions, Instructions: Take 1-2 tablets by mouth 3 times daily if needed. Max acetaminophen dose: 4000mg in 24 hrs.     Doctor's comments:  For improved non-narcotic pain relief.  Max of 6/day.  If using Vicodin, use less Tylenol to avoid goi...  aspirin 81 mg oral tablet: 1 tab(s) ( 81 mg ), PO, Daily, 0 Refill(s), Type: Maintenance  hydrALAZINE 10 mg oral tablet: 1 tab(s) ( 10 mg ), po, q6 hrs, PRN: SBP > 180, 0 Refill(s), Type: Maintenance  simethicone 125 mg oral capsule: 1 cap(s) ( 125 mg ), po, bid, PRN: for gas, 0 Refill(s), Type: Maintenance  tiZANidine 4 mg oral tablet: 2 tab(s) ( 8 mg ), PO, q8hr, # 180 tab(s), 0 Refill(s), Type: Maintenance    Problem list:    All Problems  FERNANDO (obstructive sleep apnea) / SNOMED CT 245092794 / Confirmed  HTN (hypertension) / SNOMED CT 0547017408 / Confirmed  Mild major depression / SNOMED CT 868516793 / Confirmed  GERD (gastroesophageal reflux disease) / SNOMED CT 7344069820 / Confirmed  Obese / SNOMED CT 8364868333 / Probable  Anticoagulated / SNOMED CT 302594876 / Confirmed  Paranoid schizophrenia / SNOMED CT 230543363 / Confirmed  Cervical radiculopathy / SNOMED CT 799830147 / Confirmed  Proteins serum plasma low / SNOMED CT 690977145 / Confirmed  Urinary Incontinence / ICD-9-.3 / Confirmed  Anxiety / SNOMED CT [id number] / Confirmed  Total replacement of left knee joint / SNOMED CT 8913368336 / Confirmed  History of pulmonary embolism / SNOMED CT 806899189 / Confirmed  A-Fib / SNOMED CT 9751654688 / Confirmed  Chronic pain syndrome / SNOMED CT 2877473292 / Confirmed  Teeth dysplasia / SNOMED CT 9075803469 / Confirmed  Complex regional pain syndrome / SNOMED CT 8649302395 / Confirmed  DM (diabetes mellitus), type 2 / SNOMED CT 639394039 / Confirmed  History of non-ST elevation myocardial infarction (NSTEMI) / SNOMED CT 6553513132 / Confirmed  Anemia, unspecified / SNOMED CT 128721801 / Confirmed  Opioid type dependence / SNOMED CT 710745660 / Confirmed  Resolved: *Hospitalized@[facility] - PE, hypertension, complex regional pain syndrome  Resolved: *Hospitalized@[facility] Syncope  Resolved: *Hospitalized@[facility] Cecal Volvulus  Resolved: History of DVT (deep vein thrombosis) / SNOMED CT [id number]  Resolved: Inpatient stay / SNOMED CT 875428515  Canceled: Diabetes Mellitus, Type 2 / ICD-9-.00  Canceled: Complex Regional Pain Syndrome / SNOMED CT 778330596  Canceled: PE (Pulmonary Embolism) / ICD-9-.19  Canceled: Anxiety / ICD-9-.00  Canceled: history of regional complex pain      Histories   Past Medical History:    Active  Paranoid schizophrenia  (799426666): Onset on 6/28/2012 at 57 years.  Cervical radiculopathy (034977453): Onset on 6/28/2012 at 57 years.  FERNANDO (obstructive sleep apnea) (312350609)  HTN (hypertension) (1134719926)  Mild major depression (074582198)  GERD (gastroesophageal reflux disease) (3476991140)  Obese (2919527404)  Anticoagulated (475037261)  Proteins serum plasma low (085488494)  Urinary Incontinence (788.3)  Anxiety ([id number])  History of pulmonary embolism (492546793)  Complex regional pain syndrome (7377298204)  DM (diabetes mellitus), type 2 (476963247)  History of non-ST elevation myocardial infarction (NSTEMI) (3401479873)  Anemia, unspecified (547108326)  Opioid type dependence (138094254)  Resolved  Inpatient stay (157498237): Onset on 9/2/2016 at 61 years.  Resolved on 9/3/2016 at 61 years.  Comments:  9/14/2016 CDT 10:26 PM CDT - Elise Sanots  [facility] - Weakness  *Hospitalized@[facility]: Onset on 5/7/2014 at 59 years.  Resolved on 5/22/2014 at 59 years.  *Hospitalized@[facility] - Syncope: Onset on 7/2/2012 at 57 years.  Resolved.  *Hospitalized@[facility] - PE, hypertension, complex regional pain syndrome: Onset on 6/2/2012 at 57 years.  Resolved.  History of DVT (deep vein thrombosis) ([id number]):  Resolved.   Family History:    Diabetes mellitus  Sister     Procedure history:    Meatoplasty of ear (SNOMED CT 241686208) on 10/29/2015 at 60 Years.  Comments:  9/8/2016 11:08 AM - Anastasiya Peraza  right  Extracapsular cataract extraction and insertion of intraocular lens (SNOMED CT 007688876) performed by Tim Vieyra MD on 6/11/2015 at 60 Years.  Comments:  1/19/2016 2:14 PM - Sailaja Morris  Left.  Extracapsular cataract extraction and insertion of intraocular lens (SNOMED CT 685196798) performed by Tim Vieyra MD on 5/28/2015 at 60 Years.  Comments:  6/9/2015 8:29 AM - Sailaja Morris.  Lumbar spinal fusion (SNOMED CT 57079770) on 4/8/2015 at 60  "Years.  Right hemicolectomy (SNOMED CT 795002064) performed by Fadumo LOPES. Jensen CARPENTER on 5/7/2014 at 59 Years.  Exploratory laparotomy (SNOMED CT 885096546) on 5/7/2014 at 59 Years.  Arthroplasty of knee (SNOMED CT 27979193) performed by Jose Rowland MD on 4/28/2014 at 59 Years.  Comments:  5/9/2014 7:45 AM - Elise Santos  Right total knee arthroplasty.  Excision of scalp lesion with double flap closure on 1/20/2010 at 54 Years.  Hysterectomy on 1/1/2002 at 46 Years.  Left knee surgery on 1/1/1999 at 43 Years.  Breast reduction on 1/1/1994 at 38 Years.  Repair of bladder (SNOMED CT 175359067).  Implanted dorsal column stimulator.  Comments:  12/11/2013 2:14 PM - Fay Minor  removed in 2004  Ear ulcer grafted 2011.  Left knee replaced 12/2013.  Cecal volvulus (SNOMED CT [id number]).  Back surgery 4/2015.  Ablation per patient report 4/2015.  Cervical spinal fusion (SNOMED CT 070520423).   Social History:        Alcohol Assessment: Denies Alcohol Use            \"rare use\"      Tobacco Assessment: Denies Tobacco Use            Never smoker      Employment and Education Assessment            Unemployed                     Comments:                      06/02/2012 - Ev LOPES, Jose                     Disabled      Home and Environment Assessment            Marital status: .        Physical Examination   vital signs stable, as noted above   Vital Signs   1/4/2017 1:33 PM CST Temperature Tympanic 97.4 DegF  LOW    Peripheral Pulse Rate 74 bpm    Pulse Site Radial artery    HR Method Manual    Systolic Blood Pressure 110 mmHg    Diastolic Blood Pressure 50 mmHg  LOW    Mean Arterial Pressure 70 mmHg    BP Site Left arm    BP Method Manual      Measurements from flowsheet : Measurements   1/4/2017 1:33 PM CST Height Measured - Standard 63 in    Weight Measured - Standard 237.4 lb    BSA 2.19 m2    Body Mass Index 42.05 kg/m2      General:  Alert and oriented, No acute distress.    Eye:  " Extraocular movements are intact.    HENT:  Normocephalic, Oral mucosa is moist.    Neck:  Supple.    Respiratory:  Respirations are non-labored.    Cardiovascular:  Normal rate, Regular rhythm, 1pl pitting edema.    Musculoskeletal:  No tenderness.    Integumentary:  No rash.    Neurologic:  Alert, Oriented, Normal motor function, No focal deficits, Cranial Nerves II-XII are grossly intact.    Cognition and Speech:  Oriented, Speech clear and coherent.    Psychiatric:  Appropriate mood & affect.       Health Maintenance      Recommendations     Pending (in the next year)        OverDue           DM - Foot Exam due  05/18/12  and every 1  year(s)           Breast Cancer Screen due  12/07/13  and every 1  year(s)           Depression Screen (Female) due  01/21/15  and every 1  year(s)           DM - Eye Exam due  04/03/16  and every 1  year(s)           DM - HgbA1c due  09/16/16  and every 6  month(s)           DM - Microalbumin due  10/14/16  and every 1  year(s)           Lipid Disorders Screen (Female) due  10/14/16  and every 1  year(s)        Due            Cervical Cancer Screen (if sexually active) due  01/04/17  and every 3  year(s)           Colorectal Cancer Screen (Colonoscopy) (Female) due  01/04/17  and every 10  year(s)           Colorectal Cancer Screen (Occult Blood) (Female) due  01/04/17  Variable frequency           Colorectal Cancer Screen (Sigmoidoscopy) (Female) due  01/04/17  Variable frequency           DM - Communication with Managing Provider due  01/04/17  and every 1  year(s)           Lung Cancer Screen (Female) due  01/04/17  and every 1  year(s)        Due In Future            Type 2 Diabetes Mellitus Screen (Female) not due until  03/16/17  and every 1  year(s)           Influenza Vaccine not due until  10/03/17  and every 1  year(s)     Satisfied (in the past 1 year)        Satisfied            Aspirin Therapy for Prevention of CVD (Female) on  11/11/16.           Body Mass Index  Check (Female) on  01/04/17.           Body Mass Index Check (Female) on  09/13/16.           Body Mass Index Check (Female) on  06/24/16.           Body Mass Index Check (Female) on  05/17/16.           Body Mass Index Check (Female) on  04/19/16.           Body Mass Index Check (Female) on  03/25/16.           Body Mass Index Check (Female) on  03/16/16.           Body Mass Index Check (Female) on  01/19/16.           DM - HgbA1c on  03/16/16.           High Blood Pressure Screen (Female) on  01/04/17.           High Blood Pressure Screen (Female) on  11/11/16.           High Blood Pressure Screen (Female) on  10/18/16.           High Blood Pressure Screen (Female) on  10/03/16.           High Blood Pressure Screen (Female) on  09/13/16.           High Blood Pressure Screen (Female) on  06/24/16.           High Blood Pressure Screen (Female) on  05/17/16.           High Blood Pressure Screen (Female) on  04/19/16.           High Blood Pressure Screen (Female) on  03/25/16.           High Blood Pressure Screen (Female) on  03/16/16.           High Blood Pressure Screen (Female) on  02/16/16.           High Blood Pressure Screen (Female) on  01/27/16.           High Blood Pressure Screen (Female) on  01/19/16.           Influenza Vaccine on  10/03/16.           Obesity Screen and Counseling (Female) on  01/04/17.           Obesity Screen and Counseling (Female) on  11/11/16.           Obesity Screen and Counseling (Female) on  10/18/16.           Obesity Screen and Counseling (Female) on  10/03/16.           Obesity Screen and Counseling (Female) on  09/13/16.           Obesity Screen and Counseling (Female) on  06/24/16.           Obesity Screen and Counseling (Female) on  05/17/16.           Obesity Screen and Counseling (Female) on  04/19/16.           Obesity Screen and Counseling (Female) on  03/25/16.           Obesity Screen and Counseling (Female) on  03/16/16.           Obesity Screen and Counseling  (Female) on  02/16/16.           Obesity Screen and Counseling (Female) on  01/19/16.           Tobacco Use Screen (Female) on  01/04/17.           Tobacco Use Screen (Female) on  11/11/16.           Tobacco Use Screen (Female) on  09/13/16.           Tobacco Use Screen (Female) on  06/24/16.           Tobacco Use Screen (Female) on  05/17/16.           Tobacco Use Screen (Female) on  03/25/16.           Tobacco Use Screen (Female) on  03/16/16.           Tobacco Use Screen (Female) on  02/16/16.           Tobacco Use Screen (Female) on  01/19/16.           Type 2 Diabetes Mellitus Screen (Female) on  03/16/16.        Review / Management   Results review      Impression and Plan   Diagnosis     A-Fib (YKP69-EG I48.2).     Cervical radiculopathy (BEV23-RS M54.12).     Complex regional pain syndrome (VQK09-AA G90.50).     History of pulmonary embolism (IXM28-ZO Z86.711).     HTN (hypertension) (BER99-FD I10).     Obese (CYR51-ZD E66.9).     FERNANDO (obstructive sleep apnea) (EQV46-YF G47.33).         Extended hospitalization in July-August at [facility], found obtunded on her floor by son   - CVA, felt likely cardioembolic - treated with acute thrombectomy   - MI, seen by cardiology - medical management only   - remote history of PE, bilateral DVTs seen during admission; resultant venous stasis   - maintained on warfarin    .) recurrent falls; left SNF against facility recommendations   -no further falls    .) complex regional pain syndrome - followed through [facility]   - maintained on methadone 15mg TID   - Lyrica 100mg TID  - baclofen 20mg TID  Told her I would not renew methadone oxycodone or benzo  Other meds I would  Discussed withdrawal Will go to Er if needed  Asked her to complete pain clinic paperwork    -    .) h/o recurrent thromboembolism   - maintained on warfarin   - will need to arrange INR follow-up through coumadin clinic     .) lower extremity edema    Overall, an unfortunate woman with  generally poor health, and doubt has the appropriate resources or insight to maintain independent living      Professional Services   Counseling Summary:  This was a 25 minute visit with greater than 50% of that time spent counseling the patient.
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
## (undated) RX ORDER — FENTANYL CITRATE 50 UG/ML
INJECTION, SOLUTION INTRAMUSCULAR; INTRAVENOUS
Status: DISPENSED
Start: 2018-07-25

## (undated) RX ORDER — HEPARIN SODIUM 1000 [USP'U]/ML
INJECTION, SOLUTION INTRAVENOUS; SUBCUTANEOUS
Status: DISPENSED
Start: 2018-07-25

## (undated) RX ORDER — LIDOCAINE HYDROCHLORIDE 10 MG/ML
INJECTION, SOLUTION EPIDURAL; INFILTRATION; INTRACAUDAL; PERINEURAL
Status: DISPENSED
Start: 2018-07-25